# Patient Record
Sex: MALE | Race: WHITE | NOT HISPANIC OR LATINO | Employment: OTHER | URBAN - METROPOLITAN AREA
[De-identification: names, ages, dates, MRNs, and addresses within clinical notes are randomized per-mention and may not be internally consistent; named-entity substitution may affect disease eponyms.]

---

## 2017-02-07 ENCOUNTER — TRANSFERRED RECORDS (OUTPATIENT)
Dept: HEALTH INFORMATION MANAGEMENT | Facility: CLINIC | Age: 72
End: 2017-02-07

## 2017-07-21 ENCOUNTER — OFFICE VISIT (OUTPATIENT)
Dept: URGENT CARE | Facility: URGENT CARE | Age: 72
End: 2017-07-21
Payer: COMMERCIAL

## 2017-07-21 VITALS
SYSTOLIC BLOOD PRESSURE: 146 MMHG | OXYGEN SATURATION: 99 % | BODY MASS INDEX: 17.44 KG/M2 | HEART RATE: 88 BPM | TEMPERATURE: 97.5 F | DIASTOLIC BLOOD PRESSURE: 81 MMHG | WEIGHT: 114.7 LBS

## 2017-07-21 DIAGNOSIS — H61.23 BILATERAL IMPACTED CERUMEN: Primary | ICD-10-CM

## 2017-07-21 PROCEDURE — 99213 OFFICE O/P EST LOW 20 MIN: CPT | Performed by: FAMILY MEDICINE

## 2017-07-21 NOTE — MR AVS SNAPSHOT
After Visit Summary   7/21/2017    Gavin House    MRN: [id number]           Patient Information     Date Of Birth          1945        Visit Information        Provider Department      7/21/2017 10:15 AM Eb Amezquita DO [facility]        Today's Diagnoses     Bilateral impacted cerumen    -  1      Care Instructions      Earwax Removal    The ear canal makes earwax from the canal s lining. The ears make wax to lubricate and protect the ear canal. The ear canal is the tube that connects the middle ear to the outside of the ear. The wax protects the ear from bacteria, infection, and damage from water or trauma.  The wax that forms in the canal naturally moves toward the outside of the ear and falls out. In some cases, the ear may make too much wax. If the wax causes problems or keeps the healthcare provider from seeing into the ear, the extra wax may be removed.  Too much wax can affect your hearing. It can cause itching. In rare cases, it can be painful. Earwax should not be removed unless it is causing a problem. You should not stick objects into your ear to remove wax unless told to do so by your healthcare provider.  Healthcare providers can remove earwax safely. It is important to stay still during the procedure to avoid damage to the ear canal. But removing earwax generally doesn t hurt. You will not usually need anesthesia or pain medicine when the provider removes the earwax.  A number of conditions lead to earwax buildup. These include some skin problems, a narrow ear canal, or ears that make too much earwax. Using cotton swabs in the canal pushes earwax deeper into the ear and contributes to the buildup of earwax.  Home care    The healthcare provider may recommend mineral oil or an over-the-counter eardrop to use at home to soften the earwax. Use these products only if the provider recommends them. Use these products only if the provider recommends  them. Carefully follow the instructions given.    Don t use mineral oil or OTC eardrops if you might have an ear infection or a ruptured eardrum. Tell your healthcare provider right away if you have diabetes or an immune disorder.    Don t use cotton swabs in your ears. Cotton swabs may push wax deeper into the ear canal or damage the eardrum. Use cotton gauze or a wet washcloth  to gently remove wax on the outside of the ear and around the opening to the ear canal.    Don't use any probing device or object such as cotton-tipped swabs or judith pins to clean the inside of your ears.    Don t use ear candles to clean your ears. Candling can be dangerous. It can burn the ear canal. It can also make the condition worse instead of better.    Don t use cold water to rinse the ear. This will make you dizzy. If your provider tells you to rinse your ear, use only warm water or follow his or her instructions.    Check the ear for signs of infection or irritation listed below under When to seek medical advice.  Steps for using eardrops  1. Warm the medicine bottle by rubbing it between your hands for a few minutes.  2. Lie down on your side, with the affected ear up.  3. Place the recommended number of drops in the ear. Wet a cotton ball with the medicine. Gently put the cotton ball into the ear opening.  Follow-up care  Follow up with your healthcare provider, or as directed.  When to seek medical advice  Call the provider right away if you have:    Ear pain that gets worse    Fever of 100.4F F (38 C) or higher, or as directed by your healthcare provider    Worsening wax buildup    Severe pain, dizziness, or nausea    Bleeding from the ear    Hearing problems    Signs of irritation from the eardrops, such as burning, stinging, or swelling and tenderness    Foul-smelling fluid draining from the ear    Swelling, redness, or tenderness of the outer ear    Headache, neck pain, or stiff neck  Date Last Reviewed: 3/22/2015     4912-7053 [employer]. [address] [place] [address], [place] [address]. All rights reserved. This information is not intended as a substitute for professional medical care. Always follow your healthcare professional's instructions.          Earwax, Home Treatment    Everyone produces earwax from the lining of the ear canal. It serves to lubricate and protect the ear. The wax that forms in the canal naturally moves toward the outside of the ear and falls out. Sometimes the ear canal may contain too much wax. This can cause a blockage and loss of hearing. Directions are given below for home treatment.  Home care  If your doctor has advised you to remove a wax blockage yourself, follow these directions:    Unless a medicine was prescribed, you may use an over-the-counter product made for clearing earwax. These contain carbamide peroxide. Lie down with the blocked ear facing upward. Apply one dropper full of medicine and wait a few minutes. Grasp the outer ear and wiggle it to help the solution enter the canal.    Lean over a sink or basin with the blocked ear facing downward. Use a bulb syringe filled with warm (not hot or cold) water to rinse the ear several times. Use gentle pressure only.    If you are having trouble draining the water out of your ear canal, put a few drops of rubbing alcohol (isopropyl alcohol) into the ear canal. This will help remove the remaining water.    Repeat this procedure once a day for up to three days, or until your hearing is back to normal. Do not use this treatment for more than three days in a row.  Don ts    Don t use cold water to rinse the ear. This will make you dizzy.    Don t perform this procedure if you have an ear infection.    Don t perform this procedure if you have a ruptured eardrum.    Don t use cotton swabs, matches, hairpins, keys, or other objects to  clean  the ear canal. This can cause infection of the ear canal or rupture the eardrum. Because of their size  and shape, cotton swabs can push earwax deeper into the ear canal instead of removing it.  Follow-up care  Follow up with your health care provider if you are not improving after three cleaning attempts, or as advised.  When to seek medical advice  Call your health care provider right away if any of these occur:    Worsening ear pain    Fever of 101 F (38.3 C) or higher, or as directed by your health care provider    Hearing does not return to normal after three days of treatment    Fluid drainage or bleeding from the ear canal    Swelling, redness, or tenderness of the outer ear    Headache, neck pain, or stiff neck    3367-6508 [employer]. [address] [place] [address]. All rights reserved. This information is not intended as a substitute for professional medical care. Always follow your healthcare professional's instructions.                Follow-ups after your visit        Your next 10 appointments already scheduled     Aug 02, 2017  3:45 PM CDT   PHYSICAL with Kash Eller MD   [facility] ([facility])    [address] [place] [address] [phone]            Aug 29, 2017  9:45 AM CDT   [facility] EP RETURN with Lissette Verma MD   [facility] PHYSICIANS [facility] [place] ([facility])    [address] [place] [address] [phone]              Who to contact     If you have questions or need follow up information about today's clinic visit or your schedule please contact [facility] directly at [phone].  Normal or non-critical lab and imaging results will be communicated to you by MyChart, letter or phone within 4 business days after the clinic has received the results. If you do not hear from us within 7 days, please contact the clinic through MyChart or phone. If you have a critical or abnormal lab result, we will notify you by phone as soon  "as possible.  Submit refill requests through [employer] or call your pharmacy and they will forward the refill request to us. Please allow 3 business days for your refill to be completed.          Additional Information About Your Visit        [employer] Information     [employer] lets you send messages to your doctor, view your test results, renew your prescriptions, schedule appointments and more. To sign up, go to [email or web address] . Click on \"Log in\" on the left side of the screen, which will take you to the Welcome page. Then click on \"Sign up Now\" on the right side of the page.     You will be asked to enter the access code listed below, as well as some personal information. Please follow the directions to create your username and password.     Your access code is: [email or web address]  Expires: 10/19/2017 11:10 AM     Your access code will  in 90 days. If you need help or a new code, please call your [place] clinic or [phone].        Care EveryWhere ID     This is your Care EveryWhere ID. This could be used by other organizations to access your [place] medical records  [id number]        Your Vitals Were     Pulse Temperature Pulse Oximetry BMI (Body Mass Index)          88 97.5  F (36.4  C) (Oral) 99% 17.44 kg/m2         Blood Pressure from Last 3 Encounters:   17 146/81   08/15/16 122/72   16 128/78    Weight from Last 3 Encounters:   17 114 lb 11.2 oz (52 kg)   08/15/16 116 lb (52.6 kg)   16 114 lb (51.7 kg)              We Performed the Following     Nursing Communication 1        Primary Care Provider Office Phone # Fax #    Kash Eller [phone]       [address] [address]        Equal Access to Services     MAHNAZ MACKEY : Terrence Cardoso, minda sunshine, qasabas gore. [facility] [phone].    ATENCIÓN: Si lefty florentino " disposición servicios gratuitos de asistencia lingüística. Darryl dodson [phone].    We comply with applicable federal civil rights laws and [place] laws. We do not discriminate on the basis of race, color, national origin, age, disability sex, sexual orientation or gender identity.            Thank you!     Thank you for choosing [facility]  for your care. Our goal is always to provide you with excellent care. Hearing back from our patients is one way we can continue to improve our services. Please take a few minutes to complete the written survey that you may receive in the mail after your visit with us. Thank you!             Your Updated Medication List - Protect others around you: Learn how to safely use, store and throw away your medicines at www.disposemymeds.org.          This list is accurate as of: 7/21/17 11:10 AM.  Always use your most recent med list.                   Brand Name Dispense Instructions for use Diagnosis    aspirin 325 MG tablet      Take 325 mg by mouth daily        flecainide 100 MG tablet    TAMBOCOR    180 tablet    Take 1 tablet (100 mg) by mouth 2 times daily    Atrial fibrillation and flutter (H)       metroNIDAZOLE 1 % gel    METROGEL    60 g    apply qhs after washing face    Rosacea

## 2017-07-21 NOTE — PROGRESS NOTES
SUBJECTIVE:Gavin House is a 71 year old male who presents with bilateral ear waxfor day(s).   Severity: mild and moderate   Timing:still present  Additional symptoms include none.    History of recurrent otitis: no    Past Medical History:   Diagnosis Date     Allergic rhinitis, cause unspecified      Diverticulosis of colon     Diverticulitis 5/2010     Mild intermittent asthma      Paroxysmal atrial fibrillation (H)      Pneumonia, organism     at age 16     PVC (premature ventricular contraction)     intermittent bigeminy     Rosacea      Scoliosis (and kyphoscoliosis), idiopathic      Allergies   Allergen Reactions     Penicillins      Social History   Substance Use Topics     Smoking status: Former Smoker     Smokeless tobacco: Never Used      Comment: Quit 1993     Alcohol use 0.0 oz/week     0 Standard drinks or equivalent per week      Comment: 20 drinks per week       ROS: CONSTITUTIONAL:NEGATIVE for fever, chills, change in weight  INTEGUMENTARY/SKIN: NEGATIVE for worrisome rashes, moles or lesions    OBJECTIVE:  /81 (BP Location: Left arm, Patient Position: Chair, Cuff Size: Adult Regular)  Pulse 88  Temp 97.5  F (36.4  C) (Oral)  Wt 114 lb 11.2 oz (52 kg)  SpO2 99%  BMI 17.44 kg/m2   The right TM is not visualized secondary to cerumen     The right auditory canal is obstructed with cerumen  The left TM is not visualized secondary to cerumen  The left auditory canal is obstructed with cerumen      ICD-10-CM    1. Bilateral impacted cerumen H61.23      Bilateral ear wash  F/U PCP/IM/FP/UC if worse, not any better

## 2017-07-21 NOTE — PATIENT INSTRUCTIONS
Earwax Removal    The ear canal makes earwax from the canal s lining. The ears make wax to lubricate and protect the ear canal. The ear canal is the tube that connects the middle ear to the outside of the ear. The wax protects the ear from bacteria, infection, and damage from water or trauma.  The wax that forms in the canal naturally moves toward the outside of the ear and falls out. In some cases, the ear may make too much wax. If the wax causes problems or keeps the healthcare provider from seeing into the ear, the extra wax may be removed.  Too much wax can affect your hearing. It can cause itching. In rare cases, it can be painful. Earwax should not be removed unless it is causing a problem. You should not stick objects into your ear to remove wax unless told to do so by your healthcare provider.  Healthcare providers can remove earwax safely. It is important to stay still during the procedure to avoid damage to the ear canal. But removing earwax generally doesn t hurt. You will not usually need anesthesia or pain medicine when the provider removes the earwax.  A number of conditions lead to earwax buildup. These include some skin problems, a narrow ear canal, or ears that make too much earwax. Using cotton swabs in the canal pushes earwax deeper into the ear and contributes to the buildup of earwax.  Home care    The healthcare provider may recommend mineral oil or an over-the-counter eardrop to use at home to soften the earwax. Use these products only if the provider recommends them. Use these products only if the provider recommends them. Carefully follow the instructions given.    Don t use mineral oil or OTC eardrops if you might have an ear infection or a ruptured eardrum. Tell your healthcare provider right away if you have diabetes or an immune disorder.    Don t use cotton swabs in your ears. Cotton swabs may push wax deeper into the ear canal or damage the eardrum. Use cotton gauze or a wet  washcloth  to gently remove wax on the outside of the ear and around the opening to the ear canal.    Don't use any probing device or object such as cotton-tipped swabs or judith pins to clean the inside of your ears.    Don t use ear candles to clean your ears. Candling can be dangerous. It can burn the ear canal. It can also make the condition worse instead of better.    Don t use cold water to rinse the ear. This will make you dizzy. If your provider tells you to rinse your ear, use only warm water or follow his or her instructions.    Check the ear for signs of infection or irritation listed below under When to seek medical advice.  Steps for using eardrops  1. Warm the medicine bottle by rubbing it between your hands for a few minutes.  2. Lie down on your side, with the affected ear up.  3. Place the recommended number of drops in the ear. Wet a cotton ball with the medicine. Gently put the cotton ball into the ear opening.  Follow-up care  Follow up with your healthcare provider, or as directed.  When to seek medical advice  Call the provider right away if you have:    Ear pain that gets worse    Fever of 100.4F F (38 C) or higher, or as directed by your healthcare provider    Worsening wax buildup    Severe pain, dizziness, or nausea    Bleeding from the ear    Hearing problems    Signs of irritation from the eardrops, such as burning, stinging, or swelling and tenderness    Foul-smelling fluid draining from the ear    Swelling, redness, or tenderness of the outer ear    Headache, neck pain, or stiff neck  Date Last Reviewed: 3/22/2015    6092-9174 [employer]. [address] [place] [address]. All rights reserved. This information is not intended as a substitute for professional medical care. Always follow your healthcare professional's instructions.          Earwax, Home Treatment    Everyone produces earwax from the lining of the ear canal. It serves to lubricate and protect the ear.  The wax that forms in the canal naturally moves toward the outside of the ear and falls out. Sometimes the ear canal may contain too much wax. This can cause a blockage and loss of hearing. Directions are given below for home treatment.  Home care  If your doctor has advised you to remove a wax blockage yourself, follow these directions:    Unless a medicine was prescribed, you may use an over-the-counter product made for clearing earwax. These contain carbamide peroxide. Lie down with the blocked ear facing upward. Apply one dropper full of medicine and wait a few minutes. Grasp the outer ear and wiggle it to help the solution enter the canal.    Lean over a sink or basin with the blocked ear facing downward. Use a bulb syringe filled with warm (not hot or cold) water to rinse the ear several times. Use gentle pressure only.    If you are having trouble draining the water out of your ear canal, put a few drops of rubbing alcohol (isopropyl alcohol) into the ear canal. This will help remove the remaining water.    Repeat this procedure once a day for up to three days, or until your hearing is back to normal. Do not use this treatment for more than three days in a row.  Don ts    Don t use cold water to rinse the ear. This will make you dizzy.    Don t perform this procedure if you have an ear infection.    Don t perform this procedure if you have a ruptured eardrum.    Don t use cotton swabs, matches, hairpins, keys, or other objects to  clean  the ear canal. This can cause infection of the ear canal or rupture the eardrum. Because of their size and shape, cotton swabs can push earwax deeper into the ear canal instead of removing it.  Follow-up care  Follow up with your health care provider if you are not improving after three cleaning attempts, or as advised.  When to seek medical advice  Call your health care provider right away if any of these occur:    Worsening ear pain    Fever of 101 F (38.3 C) or higher, or  as directed by your health care provider    Hearing does not return to normal after three days of treatment    Fluid drainage or bleeding from the ear canal    Swelling, redness, or tenderness of the outer ear    Headache, neck pain, or stiff neck    1116-1551 [employer]. [address] [place] [address]. All rights reserved. This information is not intended as a substitute for professional medical care. Always follow your healthcare professional's instructions.

## 2017-07-21 NOTE — NURSING NOTE
"Chief Complaint   Patient presents with     Plugged Ears     pt. states that he has a wax bulid up in his ears which have affected his hearing. 3xdays. pt.stated he used cotton swabs and ear wash to clean the ear. Left Ear       Initial /81 (BP Location: Left arm, Patient Position: Chair, Cuff Size: Adult Regular)  Pulse 88  Temp 97.5  F (36.4  C) (Oral)  Wt 114 lb 11.2 oz (52 kg)  SpO2 99%  BMI 17.44 kg/m2 Estimated body mass index is 17.44 kg/(m^2) as calculated from the following:    Height as of 8/15/16: 5' 8\" (1.727 m).    Weight as of this encounter: 114 lb 11.2 oz (52 kg).  Medication Reconciliation: complete    "

## 2017-08-23 ENCOUNTER — OFFICE VISIT (OUTPATIENT)
Dept: INTERNAL MEDICINE | Facility: CLINIC | Age: 72
End: 2017-08-23
Payer: COMMERCIAL

## 2017-08-23 VITALS
HEIGHT: 68 IN | TEMPERATURE: 98.3 F | WEIGHT: 117.4 LBS | BODY MASS INDEX: 17.79 KG/M2 | HEART RATE: 84 BPM | DIASTOLIC BLOOD PRESSURE: 84 MMHG | SYSTOLIC BLOOD PRESSURE: 132 MMHG | OXYGEN SATURATION: 97 %

## 2017-08-23 DIAGNOSIS — Z12.5 SCREENING FOR PROSTATE CANCER: ICD-10-CM

## 2017-08-23 DIAGNOSIS — Z00.00 MEDICARE ANNUAL WELLNESS VISIT, SUBSEQUENT: Primary | ICD-10-CM

## 2017-08-23 DIAGNOSIS — Z13.1 SCREENING FOR DIABETES MELLITUS: ICD-10-CM

## 2017-08-23 DIAGNOSIS — J45.20 MILD INTERMITTENT ASTHMA WITHOUT COMPLICATION: ICD-10-CM

## 2017-08-23 DIAGNOSIS — Z13.6 CARDIOVASCULAR SCREENING; LDL GOAL LESS THAN 160: ICD-10-CM

## 2017-08-23 DIAGNOSIS — K57.30 DIVERTICULOSIS OF LARGE INTESTINE WITHOUT HEMORRHAGE: ICD-10-CM

## 2017-08-23 LAB
ANION GAP SERPL CALCULATED.3IONS-SCNC: 7 MMOL/L (ref 3–14)
BUN SERPL-MCNC: 9 MG/DL (ref 7–30)
CALCIUM SERPL-MCNC: 9 MG/DL (ref 8.5–10.1)
CHLORIDE SERPL-SCNC: 103 MMOL/L (ref 94–109)
CHOLEST SERPL-MCNC: 187 MG/DL
CO2 SERPL-SCNC: 30 MMOL/L (ref 20–32)
CREAT SERPL-MCNC: 0.93 MG/DL (ref 0.66–1.25)
GFR SERPL CREATININE-BSD FRML MDRD: 80 ML/MIN/1.7M2
GLUCOSE SERPL-MCNC: 80 MG/DL (ref 70–99)
HDLC SERPL-MCNC: 70 MG/DL
LDLC SERPL CALC-MCNC: 94 MG/DL
NONHDLC SERPL-MCNC: 117 MG/DL
POTASSIUM SERPL-SCNC: 4.5 MMOL/L (ref 3.4–5.3)
PSA SERPL-ACNC: 2.79 UG/L (ref 0–4)
SODIUM SERPL-SCNC: 140 MMOL/L (ref 133–144)
TRIGL SERPL-MCNC: 116 MG/DL

## 2017-08-23 PROCEDURE — 99207 C PAF COMPLETED  NO CHARGE: CPT | Mod: 25 | Performed by: INTERNAL MEDICINE

## 2017-08-23 PROCEDURE — 99397 PER PM REEVAL EST PAT 65+ YR: CPT | Mod: 25 | Performed by: INTERNAL MEDICINE

## 2017-08-23 PROCEDURE — 80061 LIPID PANEL: CPT | Performed by: INTERNAL MEDICINE

## 2017-08-23 PROCEDURE — 80048 BASIC METABOLIC PNL TOTAL CA: CPT | Performed by: INTERNAL MEDICINE

## 2017-08-23 PROCEDURE — 36415 COLL VENOUS BLD VENIPUNCTURE: CPT | Performed by: INTERNAL MEDICINE

## 2017-08-23 PROCEDURE — G0103 PSA SCREENING: HCPCS | Performed by: INTERNAL MEDICINE

## 2017-08-23 NOTE — NURSING NOTE
"Chief Complaint   Patient presents with     Physical     fasting        Initial /84 (BP Location: Left arm, Patient Position: Chair, Cuff Size: Adult Regular)  Pulse 84  Temp 98.3  F (36.8  C) (Oral)  Ht 5' 8\" (1.727 m)  Wt 117 lb 6.4 oz (53.3 kg)  SpO2 97%  BMI 17.85 kg/m2 Estimated body mass index is 17.85 kg/(m^2) as calculated from the following:    Height as of this encounter: 5' 8\" (1.727 m).    Weight as of this encounter: 117 lb 6.4 oz (53.3 kg).  Medication Reconciliation: complete      "

## 2017-08-23 NOTE — LETTER
8/23/2017         Gavin Houes  [address]  [facility] [address]            Dear Mr. House,    I am writing to inform you of the lab tests you had performed recently.      Your cholesterol results are as follows:    Lab Results   Component Value Date    CHOL 187 08/23/2017    HDL 70 08/23/2017    LDL 94 08/23/2017    TRIG 116 08/23/2017    CHOLHDLRATIO 2.5 12/03/2014       Additional lab results are as follows:    Kidney function: NORMAL  PSA: NORMAL  Electrolytes: NORMAL  Glucose: NORMAL    Your lab testing all looked perfectly normal.  Your cholesterol is still at a very reasonable level.    Thank you for allowing me to participate in your care.  If you have further questions, please contact us at [phone].      Sincerely,        FAY Eller MD  Dept. of Internal Medicine  [facility][1]

## 2017-08-23 NOTE — MR AVS SNAPSHOT
After Visit Summary   8/23/2017    Gavin House    MRN: [id number]           Patient Information     Date Of Birth          1945        Visit Information        Provider Department      8/23/2017 8:15 AM Kash Eller MD [facility]        Today's Diagnoses     Medicare annual wellness visit, subsequent    -  1    CARDIOVASCULAR SCREENING; LDL GOAL LESS THAN 160        Screening for diabetes mellitus        Screening for prostate cancer        Mild intermittent asthma without complication        Diverticulosis of large intestine without hemorrhage          Care Instructions      Preventive Health Recommendations:   Male Ages 65 and over    Yearly exam:             See your health care provider every year in order to  o   Review health changes.   o   Discuss preventive care.    o   Review your medicines if your doctor has prescribed any.    Talk with your health care provider about whether you should have a test to screen for prostate cancer (PSA).    Every 3 years, have a diabetes test (fasting glucose). If you are at risk for diabetes, you should have this test more often.    Every 5 years, have a cholesterol test. Have this test more often if you are at risk for high cholesterol or heart disease.     Every 10 years, have a colonoscopy. Or, have a yearly FIT test (stool test). These exams will check for colon cancer.    Talk to with your health care provider about screening for Abdominal Aortic Aneurysm if you have a family history of AAA or have a history of smoking.    Shots:     Get a flu shot each year.     Get a tetanus shot every 10 years.     Talk to your doctor about your pneumonia vaccines. There are now two you should receive - Pneumovax (PPSV 23) and Prevnar (PCV 13).     Talk to your doctor about a shingles vaccine.     Talk to your doctor about the hepatitis B vaccine.  Nutrition:     Eat at least 5 servings of fruits and vegetables each day.     Eat  "whole-grain bread, whole-wheat pasta and brown rice instead of white grains and rice.     Talk to your provider about Calcium and Vitamin D.   Lifestyle    Exercise for at least 150 minutes a week (30 minutes a day, 5 days a week). This will help you control your weight and prevent disease.     Limit alcohol to one drink per day.     No smoking.     Wear sunscreen to prevent skin cancer.     See your dentist every six months for an exam and cleaning.     See your eye doctor every 1 to 2 years to screen for conditions such as glaucoma, macular degeneration, cataracts, etc           Follow-ups after your visit        Your next 10 appointments already scheduled     Aug 29, 2017  9:45 AM CDT   [facility] EP RETURN with Lissette Verma MD   [facility] PHYSICIANS [facility] AT [place] ([facility])    [address] [place] [id number] [phone]              Who to contact     If you have questions or need follow up information about today's clinic visit or your schedule please contact [facility] directly at [phone].  Normal or non-critical lab and imaging results will be communicated to you by Level [employer], letter or phone within 4 business days after the clinic has received the results. If you do not hear from us within 7 days, please contact the clinic through [employer] or phone. If you have a critical or abnormal lab result, we will notify you by phone as soon as possible.  Submit refill requests through [employer] or call your pharmacy and they will forward the refill request to us. Please allow 3 business days for your refill to be completed.          Additional Information About Your Visit        Level [email or web address] Information     [employer]School lets you send messages to your doctor, view your test results, renew your prescriptions, schedule appointments and more. To sign up, go to [email or web address] . Click on \"Log in\" on the left side of the screen, which will take you to the " "Welcome page. Then click on \"Sign up Now\" on the right side of the page.     You will be asked to enter the access code listed below, as well as some personal information. Please follow the directions to create your username and password.     Your access code is: [email or web address]  Expires: 10/19/2017 11:10 AM     Your access code will  in 90 days. If you need help or a new code, please call your [place] clinic or [phone].        Care EveryWhere ID     This is your Care EveryWhere ID. This could be used by other organizations to access your [place] medical records  [id number]        Your Vitals Were     Pulse Temperature Height Pulse Oximetry BMI (Body Mass Index)       84 98.3  F (36.8  C) (Oral) 5' 8\" (1.727 m) 97% 17.85 kg/m2        Blood Pressure from Last 3 Encounters:   17 132/84   17 146/81   08/15/16 122/72    Weight from Last 3 Encounters:   17 117 lb 6.4 oz (53.3 kg)   17 114 lb 11.2 oz (52 kg)   08/15/16 116 lb (52.6 kg)              We Performed the Following     Asthma Action Plan (AAP)     Basic metabolic panel  (Ca, Cl, CO2, Creat, Gluc, K, Na, BUN)     Lipid panel reflex to direct LDL     PAF COMPLETED     PSA, screen        Primary Care Provider Office Phone # Fax #    Kash Eller [phone]       [address] [place] [address]        Equal Access to Services     [facility]: Hadii valencia houser hadasho Sokevali, waaxda luqadaha, qaybta kaalmada sabas miller . So [facility] [phone].    ATENCIÓN: Si habla español, tiene a pugh disposición servicios gratuitos de asistencia lingüística. Llame al [phone].    We comply with applicable federal civil rights laws and [place] laws. We do not discriminate on the basis of race, color, national origin, age, disability sex, sexual orientation or gender identity.            Thank you!     Thank you for choosing [facility]  for your " care. Our goal is always to provide you with excellent care. Hearing back from our patients is one way we can continue to improve our services. Please take a few minutes to complete the written survey that you may receive in the mail after your visit with us. Thank you!             Your Updated Medication List - Protect others around you: Learn how to safely use, store and throw away your medicines at www.disposemymeds.org.          This list is accurate as of: 8/23/17 10:20 AM.  Always use your most recent med list.                   Brand Name Dispense Instructions for use Diagnosis    aspirin 325 MG tablet      Take 325 mg by mouth daily        flecainide 100 MG tablet    TAMBOCOR    180 tablet    Take 1 tablet (100 mg) by mouth 2 times daily    Atrial fibrillation and flutter (H)       metroNIDAZOLE 1 % gel    METROGEL    60 g    apply qhs after washing face    Rosacea

## 2017-08-23 NOTE — PROGRESS NOTES
SUBJECTIVE:   Gavin House is a 71 year old male who presents for Preventive Visit.      Are you in the first 12 months of your Medicare Part B coverage?  No    Healthy Habits:    Do you get at least three servings of calcium containing foods daily (dairy, green leafy vegetables, etc.)? yes    Amount of exercise or daily activities, outside of work: 15-30 mins  day(s) per week    Problems taking medications regularly No    Medication side effects: No    Have you had an eye exam in the past two years? no    Do you see a dentist twice per year? no    Do you have sleep apnea, excessive snoring or daytime drowsiness?no    COGNITIVE SCREEN  1) Repeat 3 items (Banana, Sunrise, Chair)    2) Clock draw: NORMAL  3) 3 item recall: Recalls 3 objects  Results: NORMAL clock, 1-2 items recalled: COGNITIVE IMPAIRMENT LESS LIKELY    Mini-CogTM Copyright S Cr. Licensed by the author for use in [facility]; reprinted with permission (yarelis@[email or web address]). All rights reserved.          Reviewed and updated as needed this visit by clinical staffTobacco  Allergies         Reviewed and updated as needed this visit by Provider        Social History   Substance Use Topics     Smoking status: Former Smoker     Smokeless tobacco: Never Used      Comment: Quit 1993     Alcohol use 0.0 oz/week     0 Standard drinks or equivalent per week      Comment: 20 drinks per week       .    Today's PHQ-2 Score:   PHQ-2 ( 1999 Pfizer) 8/23/2017 5/9/2016   Q1: Little interest or pleasure in doing things 0 0   Q2: Feeling down, depressed or hopeless 0 -   PHQ-2 Score 0 -   Q1: Little interest or pleasure in doing things Not at all -   Q2: Feeling down, depressed or hopeless Not at all -   PHQ-2 Score 0 -         Do you feel safe in your environment - Yes    Do you have a Health Care Directive?: No: Advance care planning reviewed with patient; information given to patient to review.      Current providers sharing in care for this patient  "include: Patient Care Team:  Kash Eller MD as PCP - General      Hearing impairment: No    Ability to successfully perform activities of daily living: Yes, no assistance needed     Fall risk:  Fallen 2 or more times in the past year?: No  Any fall with injury in the past year?: No      Home safety:  none identified      The following health maintenance items are reviewed in Epic and correct as of today:Health Maintenance   Topic Date Due     AORTIC ANEURYSM SCREENING (SYSTEM ASSIGNED)  11/23/2011     ASTHMA CONTROL TEST Q6 MOS  11/09/2016     ASTHMA ACTION PLAN Q1 YR  05/09/2017     FALL RISK ASSESSMENT  05/09/2017     INFLUENZA VACCINE (SYSTEM ASSIGNED)  09/01/2017     ADVANCE DIRECTIVE PLANNING Q5 YRS  05/08/2018     COLONOSCOPY Q10 YR  09/10/2020     LIPID SCREEN Q5 YR MALE (SYSTEM ASSIGNED)  05/05/2021     TETANUS IMMUNIZATION (SYSTEM ASSIGNED)  05/06/2023     PNEUMOCOCCAL  Completed     HEPATITIS C SCREENING  Completed       No acute issues to discuss today.  Patient continues on flecainide for atrial fibrillation/flutter, and full aspirin daily.  Continues on metronidazole as needed for rosacea, though does not feel this is particularly effective.      Patient carries diagnosis of mild intermittent asthma, but basically never has to use his rescue albuterol.  Today's ACT noted.        ROS:  Constitutional, HEENT, cardiovascular, pulmonary, gi and gu systems are negative, except as otherwise noted.      OBJECTIVE:   /84 (BP Location: Left arm, Patient Position: Chair, Cuff Size: Adult Regular)  Pulse 84  Temp 98.3  F (36.8  C) (Oral)  Ht 5' 8\" (1.727 m)  Wt 117 lb 6.4 oz (53.3 kg)  SpO2 97%  BMI 17.85 kg/m2 Estimated body mass index is 17.85 kg/(m^2) as calculated from the following:    Height as of this encounter: 5' 8\" (1.727 m).    Weight as of this encounter: 117 lb 6.4 oz (53.3 kg).  EXAM:   GENERAL: healthy, alert and no distress  NECK: no adenopathy, no asymmetry, masses, or scars " "and thyroid normal to palpation  RESP: lungs clear to auscultation - no rales, rhonchi or wheezes  CV: regular rate and rhythm, normal S1 S2, no S3 or S4, no murmur, click or rub, no peripheral edema and peripheral pulses strong  ABDOMEN: soft, nontender, no hepatosplenomegaly, no masses and bowel sounds normal  MS: no gross musculoskeletal defects noted, no edema    ASSESSMENT / PLAN:   1. Medicare annual wellness visit, subsequent      2. CARDIOVASCULAR SCREENING; LDL GOAL LESS THAN 160  Re-check lipids today  - Lipid panel reflex to direct LDL    3. Screening for diabetes mellitus    - Basic metabolic panel  (Ca, Cl, CO2, Creat, Gluc, K, Na, BUN)    4. Screening for prostate cancer    - PSA, screen    5. Mild intermittent asthma without complication    - Asthma Action Plan (AAP)    6. Diverticulosis of large intestine without hemorrhage        End of Life Planning:  Patient currently has an advanced directive: No.  I have verified the patient's ablity to prepare an advanced directive/make health care decisions.  Literature was provided to assist patient in preparing an advanced directive.    COUNSELING:  Reviewed preventive health counseling, as reflected in patient instructions        Estimated body mass index is 17.85 kg/(m^2) as calculated from the following:    Height as of this encounter: 5' 8\" (1.727 m).    Weight as of this encounter: 117 lb 6.4 oz (53.3 kg).     reports that he has quit smoking. He has never used smokeless tobacco.        Appropriate preventive services were discussed with this patient, including applicable screening as appropriate for cardiovascular disease, diabetes, osteopenia/osteoporosis, and glaucoma.  As appropriate for age/gender, discussed screening for colorectal cancer, prostate cancer, breast cancer, and cervical cancer. Checklist reviewing preventive services available has been given to the patient.    Reviewed patients plan of care and provided an AVS. The Basic Care Plan " (routine screening as documented in Health Maintenance) for Gavin meets the Care Plan requirement. This Care Plan has been established and reviewed with the Patient.    Counseling Resources:  ATP IV Guidelines  Pooled Cohorts Equation Calculator  Breast Cancer Risk Calculator  FRAX Risk Assessment  ICSI Preventive Guidelines  Dietary Guidelines for Americans, 2010  USDA's MyPlate  ASA Prophylaxis  Lung CA Screening    Kash Eller MD  [facility]

## 2017-08-24 ASSESSMENT — ASTHMA QUESTIONNAIRES: ACT_TOTALSCORE: 25

## 2017-08-29 ENCOUNTER — OFFICE VISIT (OUTPATIENT)
Dept: CARDIOLOGY | Facility: CLINIC | Age: 72
End: 2017-08-29
Attending: INTERNAL MEDICINE
Payer: COMMERCIAL

## 2017-08-29 VITALS
HEART RATE: 72 BPM | SYSTOLIC BLOOD PRESSURE: 120 MMHG | BODY MASS INDEX: 17.28 KG/M2 | HEIGHT: 68 IN | WEIGHT: 114 LBS | DIASTOLIC BLOOD PRESSURE: 70 MMHG

## 2017-08-29 DIAGNOSIS — I48.0 PAROXYSMAL ATRIAL FIBRILLATION (H): ICD-10-CM

## 2017-08-29 PROCEDURE — 99213 OFFICE O/P EST LOW 20 MIN: CPT | Mod: 25 | Performed by: INTERNAL MEDICINE

## 2017-08-29 PROCEDURE — 93000 ELECTROCARDIOGRAM COMPLETE: CPT | Performed by: INTERNAL MEDICINE

## 2017-08-29 NOTE — MR AVS SNAPSHOT
"              After Visit Summary   8/29/2017    Gavin House    MRN: [id number]           Patient Information     Date Of Birth          1945        Visit Information        Provider Department      8/29/2017 9:45 AM Lissette Verma MD [facility] AT [place]        Today's Diagnoses     Paroxysmal atrial fibrillation (H)           Follow-ups after your visit        Additional Services     Follow-Up with Cardiac Advanced Practice Provider                 Future tests that were ordered for you today     Open Future Orders        Priority Expected Expires Ordered    Follow-Up with Cardiac Advanced Practice Provider Routine 8/29/2018 1/11/2019 8/29/2017    EKG 12-lead complete w/read (Future)- to be scheduled Routine 8/24/2018 8/24/2018 8/29/2017            Who to contact     If you have questions or need follow up information about today's clinic visit or your schedule please contact [facility] HE[facility] AT [place] directly at [phone].  Normal or non-critical lab and imaging results will be communicated to you by MyChart, letter or phone within 4 business days after the clinic has received the results. If you do not hear from us within 7 days, please contact the clinic through [employer] or phone. If you have a critical or abnormal lab result, we will notify you by phone as soon as possible.  Submit refill requests through [employer] or call your pharmacy and they will forward the refill request to us. Please allow 3 business days for your refill to be completed.          Additional Information About Your Visit        Fork[employer]hart Information     [employer] lets you send messages to your doctor, view your test results, renew your prescriptions, schedule appointments and more. To sign up, go to [email or web address] . Click on \"Log in\" on the left side of the screen, which will take you to the Welcome page. Then click on \"Sign up Now\" on the right side of the page. " "    You will be asked to enter the access code listed below, as well as some personal information. Please follow the directions to create your username and password.     Your access code is: [email or web address]  Expires: 10/19/2017 11:10 AM     Your access code will  in 90 days. If you need help or a new code, please call your [place] clinic or [phone].        Care EveryWhere ID     This is your Care EveryWhere ID. This could be used by other organizations to access your [place] medical records  [id number]        Your Vitals Were     Pulse Height BMI (Body Mass Index)             72 1.727 m (5' 8\") 17.33 kg/m2          Blood Pressure from Last 3 Encounters:   17 120/70   17 132/84   17 146/81    Weight from Last 3 Encounters:   17 51.7 kg (114 lb)   17 53.3 kg (117 lb 6.4 oz)   17 52 kg (114 lb 11.2 oz)              We Performed the Following     EKG 12-lead complete w/read (Future)- to be scheduled     Follow-Up with Electrophysiologist        Primary Care Provider Office Phone # Fax #    Kash Eller [phone]       [address] [address]        Equal Access to Services     [facility]HNA[facility] MAC[facility] : Hadii valencia houser hadasho Sovictorino, waaxda luqadaha, qaybta kaalmada adejustin, sabas gallego. So [facility] [phone].    ATENCIÓN: Si habla español, tiene a pugh disposición servicios gratuitos de asistencia lingüística. Darryl al [phone].    We comply with applicable federal civil rights laws and [place] laws. We do not discriminate on the basis of race, color, national origin, age, disability sex, sexual orientation or gender identity.            Thank you!     Thank you for choosing [facility] AT [place]  for your care. Our goal is always to provide you with excellent care. Hearing back from our patients is one way we can continue to improve our services. Please take a few minutes to " complete the written survey that you may receive in the mail after your visit with us. Thank you!             Your Updated Medication List - Protect others around you: Learn how to safely use, store and throw away your medicines at www.disposemymeds.org.          This list is accurate as of: 8/29/17 10:01 AM.  Always use your most recent med list.                   Brand Name Dispense Instructions for use Diagnosis    aspirin 325 MG tablet      Take 325 mg by mouth daily        flecainide 100 MG tablet    TAMBOCOR    180 tablet    Take 1 tablet (100 mg) by mouth 2 times daily    Atrial fibrillation and flutter (H)       metroNIDAZOLE 1 % gel    METROGEL    60 g    apply qhs after washing face    Rosacea

## 2017-08-29 NOTE — PROGRESS NOTES
"HPI and Plan:   See dictation    Orders Placed This Encounter   Procedures     Follow-Up with Cardiac Advanced Practice Provider     EKG 12-lead complete w/read (Future)- to be scheduled       No orders of the defined types were placed in this encounter.      There are no discontinued medications.      Encounter Diagnosis   Name Primary?     Paroxysmal atrial fibrillation (H)        CURRENT MEDICATIONS:  Current Outpatient Prescriptions   Medication Sig Dispense Refill     flecainide (TAMBOCOR) 100 MG tablet Take 1 tablet (100 mg) by mouth 2 times daily 180 tablet 3     aspirin 325 MG tablet Take 325 mg by mouth daily       metroNIDAZOLE (METROGEL) 1 % gel apply qhs after washing face 60 g 3       ALLERGIES     Allergies   Allergen Reactions     Penicillins        PAST MEDICAL HISTORY:  Past Medical History:   Diagnosis Date     Allergic rhinitis, cause unspecified      Diverticulosis of colon     Diverticulitis 5/2010     Mild intermittent asthma      Paroxysmal atrial fibrillation (H) 05/09/2016     PVC (premature ventricular contraction)     intermittent bigeminy     Rosacea      Scoliosis (and kyphoscoliosis), idiopathic        PAST SURGICAL HISTORY:  Past Surgical History:   Procedure Laterality Date     C NONSPECIFIC PROCEDURE  1974    right inguinal herniorraphy     C NONSPECIFIC PROCEDURE  age 16    L inguinal herniorraphy       FAMILY HISTORY:  Family History   Problem Relation Age of Onset     CANCER Father      Type unknown; d: age 76     HEART DISEASE Mother      \"enlarged heart\"; d: age 75     C.A.D. Brother      bypass surgery; b: 1925       SOCIAL HISTORY:  Social History     Social History     Marital status:      Spouse name: N/A     Number of children: 2     Years of education: N/A     Occupational History      [employer]     Social History Main Topics     Smoking status: Former Smoker     Smokeless tobacco: Never Used      Comment: Quit 1993     Alcohol use 0.0 oz/week     0 Standard " "drinks or equivalent per week      Comment: 20 drinks per week     Drug use: No     Sexual activity: Yes     Partners: Female     Other Topics Concern     Parent/Sibling W/ Cabg, Mi Or Angioplasty Before 65f 55m? No     Social History Narrative       Review of Systems:  Skin:  Negative       Eyes:  Positive for glasses;cataracts    ENT:  Negative      Respiratory:  Positive for   asthma   Cardiovascular:  Negative      Gastroenterology: Positive for heartburn    Genitourinary:  Negative      Musculoskeletal:  Positive for arthritis    Neurologic:  Negative      Psychiatric:  Positive for excessive stress wife has cancer  Heme/Lymph/Imm:  Positive for allergies    Endocrine:  Negative        Physical Exam:  Vitals: /70  Pulse 72  Ht 1.727 m (5' 8\")  Wt 51.7 kg (114 lb)  BMI 17.33 kg/m2    Constitutional:  cooperative, alert and oriented, well developed, well nourished, in no acute distress        Skin:  warm and dry to the touch, no apparent skin lesions or masses noted        Head:  normocephalic, no masses or lesions        Eyes:  pupils equal and round, conjunctivae and lids unremarkable, sclera white, no xanthalasma, EOMS intact, no nystagmus        ENT:  no pallor or cyanosis, dentition good        Neck:  carotid pulses are full and equal bilaterally, JVP normal, no carotid bruit, no thyromegaly        Chest:  normal breath sounds, clear to auscultation, normal A-P diameter, normal symmetry, normal respiratory excursion, no use of accessory muscles          Cardiac: regular rhythm, normal S1/S2, no S3 or S4, apical impulse not displaced, no murmurs, gallops or rubs                  Abdomen:  abdomen soft, non-tender, BS normoactive, no mass, no HSM, no bruits        Vascular: pulses full and equal, no bruits auscultated                                        Extremities and Back:  no deformities, clubbing, cyanosis, erythema observed              Neurological:  affect appropriate, oriented to time, " person and place              CC  Lissette Verma MD  [address]  [address]  [place] [address]

## 2017-08-29 NOTE — LETTER
8/29/2017    Kash Eller MD  [address] [place] [address]    RE: Gavin House       Dear Colleague,    I had the pleasure of seeing Gavin House in the [facility].    I saw Mr. House for followup of atrial fibrillation.  He is a 71-year-old white male with symptomatic paroxysmal atrial fibrillation.  He has been on flecainide 100 mg p.o. b.i.d. with no recurrence of atrial fibrillation.  He has seemed to tolerate flecainide well without apparent side effects.  For the last year, he has been doing well with no complaints.      PHYSICAL EXAMINATION:   VITAL SIGNS:  Blood pressure was 120/70, heart rate 72 beats per minute, body weight 114 pounds.   HEENT:  Eyes and ENT were unremarkable.   LUNGS:  Clear.   CARDIAC:  Rhythm was regular, and heart sounds were normal without murmur.   ABDOMEN:  Examination showed no hepatomegaly.   EXTREMITIES:  There was no pedal edema.      EKG showed sinus rhythm.     Outpatient Encounter Prescriptions as of 8/29/2017   Medication Sig Dispense Refill     flecainide (TAMBOCOR) 100 MG tablet Take 1 tablet (100 mg) by mouth 2 times daily 180 tablet 3     aspirin 325 MG tablet Take 325 mg by mouth daily       metroNIDAZOLE (METROGEL) 1 % gel apply qhs after washing face 60 g 3     No facility-administered encounter medications on file as of 8/29/2017.       ASSESSMENT AND RECOMMENDATIONS:  Mr. House is doing well on flecainide with no side effects and no recurrent atrial fibrillation.  He is doing well with no complaints at the present time.  I agree for him to continue the current medication.  He is scheduled to return in 1 year.  He will see Ms. Ramona Walters next year and see me in 2019.   Again, thank you for allowing me to participate in the care of your patient.      Sincerely,    Lissette Verma MD     [facility]

## 2017-08-29 NOTE — PROGRESS NOTES
HISTORY OF PRESENT ILLNESS:  I saw Mr. House for followup of atrial fibrillation.  He is a 71-year-old white male with symptomatic paroxysmal atrial fibrillation.  He has been on flecainide 100 mg p.o. b.i.d. with no recurrence of atrial fibrillation.  He has seemed to tolerate flecainide well without apparent side effects.  For the last year, he has been doing well with no complaints.      PHYSICAL EXAMINATION:   VITAL SIGNS:  Blood pressure was 120/70, heart rate 72 beats per minute, body weight 114 pounds.   HEENT:  Eyes and ENT were unremarkable.   LUNGS:  Clear.   CARDIAC:  Rhythm was regular, and heart sounds were normal without murmur.   ABDOMEN:  Examination showed no hepatomegaly.   EXTREMITIES:  There was no pedal edema.      EKG showed sinus rhythm.      ASSESSMENT AND RECOMMENDATIONS:  Mr. House is doing well on flecainide with no side effects and no recurrent atrial fibrillation.  He is doing well with no complaints at the present time.  I agree for him to continue the current medication.  He is scheduled to return in 1 year.  He will see Ms. Ramona Walters next year and see me in 2019.      cc:   [place] [address]         TIMMY CASTAÑEDA MD             D: 2017 10:06   T: 2017 10:19   MT: LUI      Name:     ZOIE HOUSE   MRN:      [id number]        Account:      [id number]   :      1945           Service Date: 2017      Document: [id number]

## 2017-11-24 DIAGNOSIS — I48.92 ATRIAL FIBRILLATION AND FLUTTER (H): ICD-10-CM

## 2017-11-24 DIAGNOSIS — I48.91 ATRIAL FIBRILLATION AND FLUTTER (H): ICD-10-CM

## 2017-11-24 RX ORDER — FLECAINIDE ACETATE 100 MG/1
100 TABLET ORAL 2 TIMES DAILY
Qty: 180 TABLET | Refills: 2 | Status: SHIPPED | OUTPATIENT
Start: 2017-11-24 | End: 2018-08-31

## 2018-07-16 ENCOUNTER — OFFICE VISIT (OUTPATIENT)
Dept: URGENT CARE | Facility: URGENT CARE | Age: 73
End: 2018-07-16
Payer: COMMERCIAL
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
## 2018-07-16 DIAGNOSIS — S61.259A DOG BITE OF FINGER, INITIAL ENCOUNTER: Primary | ICD-10-CM

## 2018-07-16 DIAGNOSIS — L03.114 CELLULITIS OF LEFT UPPER EXTREMITY: ICD-10-CM

## 2018-07-16 PROCEDURE — 99214 OFFICE O/P EST MOD 30 MIN: CPT | Performed by: PHYSICIAN ASSISTANT

## 2018-07-16 RX ORDER — CLINDAMYCIN HCL 300 MG
300 CAPSULE ORAL 3 TIMES DAILY
Qty: 30 CAPSULE | Refills: 0 | Status: SHIPPED | OUTPATIENT
Start: 2018-07-16 | End: 2018-09-12

## 2018-07-16 RX ORDER — SULFAMETHOXAZOLE/TRIMETHOPRIM 800-160 MG
1 TABLET ORAL 2 TIMES DAILY
Qty: 20 TABLET | Refills: 0 | Status: SHIPPED | OUTPATIENT
Start: 2018-07-16 | End: 2018-09-12

## 2018-07-16 NOTE — MR AVS SNAPSHOT
After Visit Summary   7/16/2018    Gavin House    MRN: [id number]           Patient Information     Date Of Birth          1945        Visit Information        Provider Department      7/16/2018 8:10 PM Bo Ochoa PA-C [facility]        Today's Diagnoses     Dog bite of finger, initial encounter    -  1    Cellulitis of left upper extremity          Care Instructions      Discharge Instructions for Cellulitis  You have been diagnosed with cellulitis. This is an infection in the deepest layer of the skin. In some cases, the infection also affects the muscle. Cellulitis is caused by bacteria. The bacteria can enter the body through broken skin. This can happen with a cut, scratch, animal bite, or an insect bite that has been scratched. You may have been treated in the hospital with antibiotics and fluids. You will likely be given a prescription for antibiotics to take at home. This sheet will help you take care of yourself at home.  Home care  When you are home:    Take the prescribed antibiotic medicine you are given as directed until it is gone. Take it even if you feel better. It treats the infection and stops it from returning. Not taking all the medicine can make future infections hard to treat.    Keep the infected area clean.    When possible, raise the infected area above the level of your heart. This helps keep swelling down.    Talk with your healthcare provider if you are in pain. Ask what kind of over-the-counter medicine you can take for pain.    Apply clean bandages as advised.    Take your temperature once a day for a week.    Wash your hands often to prevent spreading the infection.  In the future, wash your hands before and after you touch cuts, scratches, or bandages. This will help prevent infection.   When to call your healthcare provider  Call your healthcare provider immediately if you have any of the following:    Difficulty or pain when  moving the joints above or below the infected area    Discharge or pus draining from the area    Fever of 100.4 F (38 C) or higher, or as directed by your healthcare provider    Pain that gets worse in or around the infected     Redness that gets worse in or around the infected area, particularly if the area of redness expands to a wider area    Shaking chills    Swelling of the infected area    Vomiting   Date Last Reviewed: 8/1/2016 2000-2017 [employer]. [address] [place] [address], [place] [address]. All rights reserved. This information is not intended as a substitute for professional medical care. Always follow your healthcare professional's instructions.        Dog Bite  A dog bite can cause a wound deep enough to break the skin. In such cases, the wound is cleaned and sometimes closed. If the wound is closed, it is usually not completely closed. This is so that fluid can drain if the wound becomes infected. Often, wounds will be left open to heal. In addition to wound care, a tetanus shot may be given, if needed.    Home care    Wash your hands well with soap and warm water before and after caring for the wound. This helps lower the risk of infection.    Care for the wound as directed. If a dressing was applied to the wound, be sure to change it as directed.    If the wound bleeds, place a clean, soft cloth on the wound. Then firmly apply pressure until the bleeding stops. This may take up to 5 minutes. Do not release the pressure and look at the wound during this time.    Most wounds heal within 10 days. But an infection can occur even with proper treatment. So be sure to check the wound daily for signs of infection (see below).    Antibiotics may be prescribed. These help prevent or treat infection. If you re given antibiotics, take them as directed. Also be sure to complete the medicines.  Rabies prevention  Rabies is a virus that can be carried in certain animals. These can include domestic  animals such as dogs and cats. Pets fully vaccinated against rabies (2 shots) are at very low risk of infection. But because human rabies is almost always fatal, any biting pet should be confined for 10 days as an extra precaution. In general, if there is a risk for rabies, the following steps may need to be taken:    If someone s pet dog has bitten you, it should be kept in a secure area for the next 10 days to watch for signs of illness. (If the pet owner won t allow this, contact your local animal control center.) If the dog becomes ill or dies during that time, contact your local animal control center at once so the animal may be tested for rabies. If the dog stays healthy for the next 10 days, there is no danger of rabies in the animal or you.  ? If a stray dog bit you, contact your local animal control center. They can give information on capture, quarantine, and animal rabies testing.  ? If you can t find the animal that bit you in the next 2 days, and if rabies exists in your area, you may need to receive the rabies vaccine series. Call your healthcare provider right away. Or, return to the emergency department promptly.  ? All animal bites should be reported to the local animal control center. If you were not given a form to fill out, you can report this yourself.  Follow-up care  Follow up with your healthcare provider, or as directed.  When to seek medical advice  Call your healthcare provider right away if any of these occur:    Signs of infection:  ? Spreading redness or warmth from the wound  ? Increased pain or swelling  ? Fever of 100.4 F (38 C) or higher, or as directed by your healthcare provider  ? Colored fluid or pus draining from the wound    Signs of rabies infection:  ? Headache  ? Confusion  ? Strange behavior  ? Increased salivating and drooling  ? Seizure    Decreased ability to move any body part near the wound    Bleeding that can't be stopped after 5 minutes of firm pressure  Date Last  "Reviewed: 3/1/2017    1484-7296 [employer]. [address] [place] [address], [place] [address]. All rights reserved. This information is not intended as a substitute for professional medical care. Always follow your healthcare professional's instructions.                Follow-ups after your visit        Who to contact     If you have questions or need follow up information about today's clinic visit or your schedule please contact [facility] directly at [phone].  Normal or non-critical lab and imaging results will be communicated to you by MyChart, letter or phone within 4 business days after the clinic has received the results. If you do not hear from us within 7 days, please contact the clinic through [employer] or phone. If you have a critical or abnormal lab result, we will notify you by phone as soon as possible.  Submit refill requests through [employer] or call your pharmacy and they will forward the refill request to us. Please allow 3 business days for your refill to be completed.          Additional Information About Your Visit        [employer] Information     [employer] lets you send messages to your doctor, view your test results, renew your prescriptions, schedule appointments and more. To sign up, go to [email or web address] . Click on \"Log in\" on the left side of the screen, which will take you to the Welcome page. Then click on \"Sign up Now\" on the right side of the page.     You will be asked to enter the access code listed below, as well as some personal information. Please follow the directions to create your username and password.     Your access code is: [email or web address]  Expires: 10/17/2018  3:11 PM     Your access code will  in 90 days. If you need help or a new code, please call your [facility] clinic or [phone].        Care EveryWhere ID     This is your Care EveryWhere ID. This could be used by other organizations to access your [facility] medical " records  [id number]        Your Vitals Were     Pulse Temperature Pulse Oximetry BMI (Body Mass Index)          78 98.8  F (37.1  C) 98% 17.49 kg/m2         Blood Pressure from Last 3 Encounters:   07/19/18 135/85   08/29/17 120/70   08/23/17 132/84    Weight from Last 3 Encounters:   07/19/18 115 lb (52.2 kg)   08/29/17 114 lb (51.7 kg)   08/23/17 117 lb 6.4 oz (53.3 kg)              Today, you had the following     No orders found for display         Today's Medication Changes          These changes are accurate as of 7/16/18 11:59 PM.  If you have any questions, ask your nurse or doctor.               Start taking these medicines.        Dose/Directions    clindamycin 300 MG capsule   Commonly known as:  CLEOCIN   Used for:  Cellulitis of left upper extremity, Dog bite of finger, initial encounter        Dose:  300 mg   Take 1 capsule (300 mg) by mouth 3 times daily   Quantity:  30 capsule   Refills:  0       sulfamethoxazole-trimethoprim 800-160 MG per tablet   Commonly known as:  BACTRIM DS/SEPTRA DS   Used for:  Dog bite of finger, initial encounter, Cellulitis of left upper extremity        Dose:  1 tablet   Take 1 tablet by mouth 2 times daily   Quantity:  20 tablet   Refills:  0            Where to get your medicines      Some of these will need a paper prescription and others can be bought over the counter.  Ask your nurse if you have questions.     Bring a paper prescription for each of these medications     clindamycin 300 MG capsule    sulfamethoxazole-trimethoprim 800-160 MG per tablet                Primary Care Provider Office Phone # Fax #    Kash Eller [phone] [address] [address]        Equal Access to Services     SANTIAGO MACKEY : Terrence Cardoso, minda sunshine, sabas hector. So [facility] [phone].    ATENCIÓN: Si habla español, tiene a pugh disposición servicios gratuitos  de asistencia lingüística. Darryl dodson [phone].    We comply with applicable federal civil rights laws and [place] laws. We do not discriminate on the basis of race, color, national origin, age, disability, sex, sexual orientation, or gender identity.            Thank you!     Thank you for choosing [facility]  for your care. Our goal is always to provide you with excellent care. Hearing back from our patients is one way we can continue to improve our services. Please take a few minutes to complete the written survey that you may receive in the mail after your visit with us. Thank you!             Your Updated Medication List - Protect others around you: Learn how to safely use, store and throw away your medicines at www.disposemymeds.org.          This list is accurate as of 7/16/18 11:59 PM.  Always use your most recent med list.                   Brand Name Dispense Instructions for use Diagnosis    aspirin 325 MG tablet      Take 325 mg by mouth daily        clindamycin 300 MG capsule    CLEOCIN    30 capsule    Take 1 capsule (300 mg) by mouth 3 times daily    Cellulitis of left upper extremity, Dog bite of finger, initial encounter       flecainide 100 MG tablet    TAMBOCOR    180 tablet    Take 1 tablet (100 mg) by mouth 2 times daily    Atrial fibrillation and flutter (H)       metroNIDAZOLE 1 % gel    METROGEL    60 g    apply qhs after washing face    Rosacea       sulfamethoxazole-trimethoprim 800-160 MG per tablet    BACTRIM DS/SEPTRA DS    20 tablet    Take 1 tablet by mouth 2 times daily    Dog bite of finger, initial encounter, Cellulitis of left upper extremity

## 2018-07-17 NOTE — PATIENT INSTRUCTIONS
Discharge Instructions for Cellulitis  You have been diagnosed with cellulitis. This is an infection in the deepest layer of the skin. In some cases, the infection also affects the muscle. Cellulitis is caused by bacteria. The bacteria can enter the body through broken skin. This can happen with a cut, scratch, animal bite, or an insect bite that has been scratched. You may have been treated in the hospital with antibiotics and fluids. You will likely be given a prescription for antibiotics to take at home. This sheet will help you take care of yourself at home.  Home care  When you are home:    Take the prescribed antibiotic medicine you are given as directed until it is gone. Take it even if you feel better. It treats the infection and stops it from returning. Not taking all the medicine can make future infections hard to treat.    Keep the infected area clean.    When possible, raise the infected area above the level of your heart. This helps keep swelling down.    Talk with your healthcare provider if you are in pain. Ask what kind of over-the-counter medicine you can take for pain.    Apply clean bandages as advised.    Take your temperature once a day for a week.    Wash your hands often to prevent spreading the infection.  In the future, wash your hands before and after you touch cuts, scratches, or bandages. This will help prevent infection.   When to call your healthcare provider  Call your healthcare provider immediately if you have any of the following:    Difficulty or pain when moving the joints above or below the infected area    Discharge or pus draining from the area    Fever of 100.4 F (38 C) or higher, or as directed by your healthcare provider    Pain that gets worse in or around the infected     Redness that gets worse in or around the infected area, particularly if the area of redness expands to a wider area    Shaking chills    Swelling of the infected area    Vomiting   Date Last Reviewed:  8/1/2016 2000-2017 [employer]. [address] [place] [address], [place] [address]. All rights reserved. This information is not intended as a substitute for professional medical care. Always follow your healthcare professional's instructions.        Dog Bite  A dog bite can cause a wound deep enough to break the skin. In such cases, the wound is cleaned and sometimes closed. If the wound is closed, it is usually not completely closed. This is so that fluid can drain if the wound becomes infected. Often, wounds will be left open to heal. In addition to wound care, a tetanus shot may be given, if needed.    Home care    Wash your hands well with soap and warm water before and after caring for the wound. This helps lower the risk of infection.    Care for the wound as directed. If a dressing was applied to the wound, be sure to change it as directed.    If the wound bleeds, place a clean, soft cloth on the wound. Then firmly apply pressure until the bleeding stops. This may take up to 5 minutes. Do not release the pressure and look at the wound during this time.    Most wounds heal within 10 days. But an infection can occur even with proper treatment. So be sure to check the wound daily for signs of infection (see below).    Antibiotics may be prescribed. These help prevent or treat infection. If you re given antibiotics, take them as directed. Also be sure to complete the medicines.  Rabies prevention  Rabies is a virus that can be carried in certain animals. These can include domestic animals such as dogs and cats. Pets fully vaccinated against rabies (2 shots) are at very low risk of infection. But because human rabies is almost always fatal, any biting pet should be confined for 10 days as an extra precaution. In general, if there is a risk for rabies, the following steps may need to be taken:    If someone s pet dog has bitten you, it should be kept in a secure area for the next 10 days to watch for signs  of illness. (If the pet owner won t allow this, contact your local animal control center.) If the dog becomes ill or dies during that time, contact your local animal control center at once so the animal may be tested for rabies. If the dog stays healthy for the next 10 days, there is no danger of rabies in the animal or you.  ? If a stray dog bit you, contact your local animal control center. They can give information on capture, quarantine, and animal rabies testing.  ? If you can t find the animal that bit you in the next 2 days, and if rabies exists in your area, you may need to receive the rabies vaccine series. Call your healthcare provider right away. Or, return to the emergency department promptly.  ? All animal bites should be reported to the local animal control center. If you were not given a form to fill out, you can report this yourself.  Follow-up care  Follow up with your healthcare provider, or as directed.  When to seek medical advice  Call your healthcare provider right away if any of these occur:    Signs of infection:  ? Spreading redness or warmth from the wound  ? Increased pain or swelling  ? Fever of 100.4 F (38 C) or higher, or as directed by your healthcare provider  ? Colored fluid or pus draining from the wound    Signs of rabies infection:  ? Headache  ? Confusion  ? Strange behavior  ? Increased salivating and drooling  ? Seizure    Decreased ability to move any body part near the wound    Bleeding that can't be stopped after 5 minutes of firm pressure  Date Last Reviewed: 3/1/2017    7266-4364 [employer]. [address] [place] [address], [place] [address]. All rights reserved. This information is not intended as a substitute for professional medical care. Always follow your healthcare professional's instructions.

## 2018-07-19 VITALS
HEART RATE: 78 BPM | OXYGEN SATURATION: 98 % | DIASTOLIC BLOOD PRESSURE: 85 MMHG | BODY MASS INDEX: 17.49 KG/M2 | TEMPERATURE: 98.8 F | SYSTOLIC BLOOD PRESSURE: 135 MMHG | WEIGHT: 115 LBS

## 2018-07-19 NOTE — PROGRESS NOTES
SUBJECTIVE:  Gavin House is a 72 year old male who presents with a chief complaint of an animal bite on the left hand, finger.  He was bitten by a dog 3-4 days ago.   Cicumstances of bite: unprovoked attack.  Severity: bite with skin break.  Animal's immunizations up to date   Associated symptoms: immediate pain    last tetanus booster within 10 years    Past Medical History:   Diagnosis Date     Allergic rhinitis, cause unspecified      Diverticulosis of colon     Diverticulitis 5/2010     Mild intermittent asthma      Paroxysmal atrial fibrillation (H) 05/09/2016     PVC (premature ventricular contraction)     intermittent bigeminy     Rosacea      Scoliosis (and kyphoscoliosis), idiopathic      Allergies   Allergen Reactions     Penicillins      Social History   Substance Use Topics     Smoking status: Former Smoker     Smokeless tobacco: Never Used      Comment: Quit 1993     Alcohol use 0.0 oz/week     0 Standard drinks or equivalent per week      Comment: 20 drinks per week       ROS:  CONSTITUTIONAL:NEGATIVE for fever, chills, change in weight  INTEGUMENTARY/SKIN: NEGATIVE for worrisome rashes, moles or lesions  MUSCULOSKELETAL: POSITIVE  for left hand, tenderness, swelling and erythema  VASC: POSITIVE for warm extremities  LYMPH: Negative for swollen lymph nodes  NEURO: NEGATIVE for weakness, dizziness or paresthesias    OBJECTIVE:  /85  Pulse 78  Temp 98.8  F (37.1  C)  Wt 115 lb (52.2 kg)  SpO2 98%  BMI 17.49 kg/m2  GENERAL: healthy, alert no acute distress  SKIN: positive for erythema, swelling wrist and fingers  MS:extremities normal- no gross deformities noted,  FROM noted in all extremities  NEURO: Normal strength and tone, sensory exam grossly normal,  normal speech and mentation  SKIN: Positive for left hand, finger erythema, swelling, tenderness  LYMPH: Negative for lymph node swelling    ASSESSMENT/PLAN:      ICD-10-CM    1. Dog bite of finger, initial encounter S61.259A clindamycin  (CLEOCIN) 300 MG capsule    W54.0XXA sulfamethoxazole-trimethoprim (BACTRIM DS/SEPTRA DS) 800-160 MG per tablet   2. Cellulitis of left upper extremity L03.114 clindamycin (CLEOCIN) 300 MG capsule     sulfamethoxazole-trimethoprim (BACTRIM DS/SEPTRA DS) 800-160 MG per tablet       Orders Placed This Encounter     clindamycin (CLEOCIN) 300 MG capsule     sulfamethoxazole-trimethoprim (BACTRIM DS/SEPTRA DS) 800-160 MG per tablet     Monitor symptoms very closely  Go to the ED if symptoms worsen  See orders in epic

## 2018-08-31 ENCOUNTER — OFFICE VISIT (OUTPATIENT)
Dept: CARDIOLOGY | Facility: CLINIC | Age: 73
End: 2018-08-31
Attending: INTERNAL MEDICINE
Payer: COMMERCIAL

## 2018-08-31 VITALS
DIASTOLIC BLOOD PRESSURE: 90 MMHG | SYSTOLIC BLOOD PRESSURE: 160 MMHG | BODY MASS INDEX: 17.59 KG/M2 | HEART RATE: 68 BPM | WEIGHT: 116.1 LBS | HEIGHT: 68 IN

## 2018-08-31 DIAGNOSIS — Z51.81 ENCOUNTER FOR MONITORING FLECAINIDE THERAPY: ICD-10-CM

## 2018-08-31 DIAGNOSIS — Z79.899 ENCOUNTER FOR MONITORING FLECAINIDE THERAPY: ICD-10-CM

## 2018-08-31 DIAGNOSIS — I48.0 PAROXYSMAL ATRIAL FIBRILLATION (H): Primary | ICD-10-CM

## 2018-08-31 PROCEDURE — 99213 OFFICE O/P EST LOW 20 MIN: CPT | Performed by: NURSE PRACTITIONER

## 2018-08-31 PROCEDURE — 93000 ELECTROCARDIOGRAM COMPLETE: CPT | Performed by: NURSE PRACTITIONER

## 2018-08-31 RX ORDER — FLECAINIDE ACETATE 100 MG/1
100 TABLET ORAL 2 TIMES DAILY
Qty: 180 TABLET | Refills: 3 | Status: SHIPPED | OUTPATIENT
Start: 2018-08-31 | End: 2019-09-09

## 2018-08-31 NOTE — PROGRESS NOTES
Service Date: 08/31/2018      HISTORY OF PRESENT ILLNESS:  Mr. House is a delightful 72-year-old gentleman that I am having the pleasure of meeting today.  He has a history of symptomatic paroxysmal atrial fibrillation treated with flecainide at 100 mg b.i.d.  He has had no reoccurrence of AFib while on this medication.  He denies any chest discomfort, shortness of breath, lightheadedness or dizziness.      A 12-lead EKG today is stable, showing sinus rhythm at 70 beats per minute with a stable QRS of 100 milliseconds.  When compared to his previous EKG, no acute changes are noted.      He did undergo a treadmill for proarrhythmia back in 2016, which was negative for inducible ischemia and no ventricular arrhythmias were noted.      Unfortunately, at today's visit his blood pressure is slightly elevated.  The first reading was 174/80, second reading 156/82, and last reading 160/90.  The patient states that he was very anxious about coming in this morning.  He is always nervous that we are going to have him do another treadmill study or additional diagnostic testing.  In fact, he states that his wife told him he needed to settle down.  He has no history of hypertension.  He was just seen at Dr. Eller's office a week ago and was normotensive.      Echocardiogram completed 06/2016 showed grade I diastolic dysfunction with an EF of 55%-60% with no significant valvular insufficiencies.  All other review of systems are noted below.      ASSESSMENT AND PLAN:  Mr. House is a delightful 72-year-old gentleman here today for his annual followup.  He is stable on flecainide at 100 mg b.i.d.  His EKG is stable.      I have asked that he spot check his blood pressure.  I would like him to see Dr. Eller in 2-3 months to have his pressure reassessed.  His current CHADS-VASc score is 1 (age).  He is on aspirin therapy.  I did not want to place the patient on antihypertensive medications since this is his first elevated reading.       He will follow up with Dr. Verma next year with an annual EKG.  He will contact us in the interim if there are questions or concerns.      Thank you for including us in his care.      cc:   FAY Eller MD    [address]  [address]         WERO PITTS NP             D: 2018   T: 2018   MT: LUI      Name:     ZOIE DEL VALLE   MRN:      [id number]        Account:      [id number]   :      1945           Service Date: 2018      Document: [id number]

## 2018-08-31 NOTE — LETTER
8/31/2018      Kash Eller MD  [address] [address]      RE: Gavin House       Dear Colleague,    I had the pleasure of seeing Gavin House in the [facility].    Service Date: 08/31/2018      HISTORY OF PRESENT ILLNESS:  Mr. House is a delightful 72-year-old gentleman that I am having the pleasure of meeting today.  He has a history of symptomatic paroxysmal atrial fibrillation treated with flecainide at 100 mg b.i.d.  He has had no reoccurrence of AFib while on this medication.  He denies any chest discomfort, shortness of breath, lightheadedness or dizziness.      A 12-lead EKG today is stable, showing sinus rhythm at 70 beats per minute with a stable QRS of 100 milliseconds.  When compared to his previous EKG, no acute changes are noted.      He did undergo a treadmill for proarrhythmia back in 2016, which was negative for inducible ischemia and no ventricular arrhythmias were noted.      Unfortunately, at today's visit his blood pressure is slightly elevated.  The first reading was 174/80, second reading 156/82, and last reading 160/90.  The patient states that he was very anxious about coming in this morning.  He is always nervous that we are going to have him do another treadmill study or additional diagnostic testing.  In fact, he states that his wife told him he needed to settle down.  He has no history of hypertension.  He was just seen at Dr. Eller's office a week ago and was normotensive.      Echocardiogram completed 06/2016 showed grade I diastolic dysfunction with an EF of 55%-60% with no significant valvular insufficiencies.  All other review of systems are noted below.      ASSESSMENT AND PLAN:  Mr. House is a delightful 72-year-old gentleman here today for his annual followup.  He is stable on flecainide at 100 mg b.i.d.  His EKG is stable.      I have asked that he spot check his blood pressure.  I would like him to see Dr. Eller in  2-3 months to have his pressure reassessed.  His current CHADS-VASc score is 1 (age).  He is on aspirin therapy.  I did not want to place the patient on antihypertensive medications since this is his first elevated reading.      He will follow up with Dr. Verma next year with an annual EKG.  He will contact us in the interim if there are questions or concerns.      Thank you for including us in his care.      cc:   FAY Eller MD    [facility]    [address] [place] [address]         RAMONA WALTERS NP             D: 2018   T: 2018   MT: LUI      Name:     ZOIE DEL VALLE   MRN:      [id number]        Account:      [id number]   :      1945           Service Date: 2018      Document: [id number]         Outpatient Encounter Prescriptions as of 2018   Medication Sig Dispense Refill     aspirin 325 MG tablet Take 325 mg by mouth daily       flecainide (TAMBOCOR) 100 MG tablet Take 1 tablet (100 mg) by mouth 2 times daily 180 tablet 3     metroNIDAZOLE (METROGEL) 1 % gel apply qhs after washing face 60 g 3     clindamycin (CLEOCIN) 300 MG capsule Take 1 capsule (300 mg) by mouth 3 times daily (Patient not taking: Reported on 2018) 30 capsule 0     sulfamethoxazole-trimethoprim (BACTRIM DS/SEPTRA DS) 800-160 MG per tablet Take 1 tablet by mouth 2 times daily (Patient not taking: Reported on 2018) 20 tablet 0     [DISCONTINUED] flecainide (TAMBOCOR) 100 MG tablet Take 1 tablet (100 mg) by mouth 2 times daily 180 tablet 2     No facility-administered encounter medications on file as of 2018.        Again, thank you for allowing me to participate in the care of your patient.      Sincerely,    Ramona Walters NP, APRN [facility]

## 2018-08-31 NOTE — MR AVS SNAPSHOT
"              After Visit Summary   8/31/2018    Gavin House    MRN: [id number]           Patient Information     Date Of Birth          1945        Visit Information        Provider Department      8/31/2018 8:00 AM Ramona Walters APRN CNP [facility]        Today's Diagnoses     Paroxysmal atrial fibrillation (H)    -  1    Encounter for monitoring flecainide therapy          Care Instructions    Please have your blood pressure rechecked in 2-3 months at Dr. Eller's office  See  with an EKG next year          Follow-ups after your visit        Who to contact     If you have questions or need follow up information about today's clinic visit or your schedule please contact [facility] directly at [phone].  Normal or non-critical lab and imaging results will be communicated to you by MyChart, letter or phone within 4 business days after the clinic has received the results. If you do not hear from us within 7 days, please contact the clinic through MyChart or phone. If you have a critical or abnormal lab result, we will notify you by phone as soon as possible.  Submit refill requests through [employer] or call your pharmacy and they will forward the refill request to us. Please allow 3 business days for your refill to be completed.          Additional Information About Your Visit        Care EveryWhere ID     This is your Care EveryWhere ID. This could be used by other organizations to access your [facility] medical records  [id number]        Your Vitals Were     Pulse Height BMI (Body Mass Index)             68 1.727 m (5' 8\") 17.65 kg/m2          Blood Pressure from Last 3 Encounters:   08/31/18 160/90   07/19/18 135/85   08/29/17 120/70    Weight from Last 3 Encounters:   08/31/18 52.7 kg (116 lb 1.6 oz)   07/19/18 52.2 kg (115 lb)   08/29/17 51.7 kg (114 lb)              We Performed the Following     EKG 12-lead complete " w/read - Clinics (performed today)     Follow-Up with Cardiac Advanced Practice Provider          Where to get your medicines      These medications were sent to [facility] [address] [place] [address] [address] [facility]  700 [facility] [address]     Phone:  [phone]     flecainide 100 MG tablet          Primary Care Provider Office Phone # Fax #    Kash Eller [phone] [address] [address]        Equal Access to Services     MAHNAZ MACKEY : Hadii aad ku hadasho Soomaali, waaxda luqadaha, qaybta kaalmada adeegyada, waxay idiin hayaan adeeg kharash magdaleno . So [facility] [phone].    ATENCIÓN: Si habla español, tiene a pugh disposición servicios gratuitos de asistencia lingüística. [facility] [phone].    We comply with applicable federal civil rights laws and [place] laws. We do not discriminate on the basis of race, color, national origin, age, disability, sex, sexual orientation, or gender identity.            Thank you!     Thank you for choosing [facility]  for your care. Our goal is always to provide you with excellent care. Hearing back from our patients is one way we can continue to improve our services. Please take a few minutes to complete the written survey that you may receive in the mail after your visit with us. Thank you!             Your Updated Medication List - Protect others around you: Learn how to safely use, store and throw away your medicines at www.disposemymeds.org.          This list is accurate as of 8/31/18  8:28 AM.  Always use your most recent med list.                   Brand Name Dispense Instructions for use Diagnosis    aspirin 325 MG tablet      Take 325 mg by mouth daily        clindamycin 300 MG capsule    CLEOCIN    30 capsule    Take 1 capsule (300 mg) by mouth 3 times daily    Cellulitis of left upper extremity, Dog bite of finger, initial encounter        flecainide 100 MG tablet    TAMBOCOR    180 tablet    Take 1 tablet (100 mg) by mouth 2 times daily    Paroxysmal atrial fibrillation (H)       metroNIDAZOLE 1 % gel    METROGEL    60 g    apply qhs after washing face    Rosacea       sulfamethoxazole-trimethoprim 800-160 MG per tablet    BACTRIM DS/SEPTRA DS    20 tablet    Take 1 tablet by mouth 2 times daily    Dog bite of finger, initial encounter, Cellulitis of left upper extremity

## 2018-08-31 NOTE — PROGRESS NOTES
HPI and Plan: #[id number]  See dictation    Orders Placed This Encounter   Procedures     EKG 12-lead complete w/read - Clinics (performed today)       Orders Placed This Encounter   Medications     flecainide (TAMBOCOR) 100 MG tablet     Sig: Take 1 tablet (100 mg) by mouth 2 times daily     Dispense:  180 tablet     Refill:  3       Medications Discontinued During This Encounter   Medication Reason     flecainide (TAMBOCOR) 100 MG tablet Reorder         Encounter Diagnoses   Name Primary?     Paroxysmal atrial fibrillation (H) Yes     Encounter for monitoring flecainide therapy        CURRENT MEDICATIONS:  Current Outpatient Prescriptions   Medication Sig Dispense Refill     aspirin 325 MG tablet Take 325 mg by mouth daily       flecainide (TAMBOCOR) 100 MG tablet Take 1 tablet (100 mg) by mouth 2 times daily 180 tablet 3     metroNIDAZOLE (METROGEL) 1 % gel apply qhs after washing face 60 g 3     clindamycin (CLEOCIN) 300 MG capsule Take 1 capsule (300 mg) by mouth 3 times daily (Patient not taking: Reported on 8/31/2018) 30 capsule 0     sulfamethoxazole-trimethoprim (BACTRIM DS/SEPTRA DS) 800-160 MG per tablet Take 1 tablet by mouth 2 times daily (Patient not taking: Reported on 8/31/2018) 20 tablet 0     [DISCONTINUED] flecainide (TAMBOCOR) 100 MG tablet Take 1 tablet (100 mg) by mouth 2 times daily 180 tablet 2       ALLERGIES     Allergies   Allergen Reactions     Penicillins        PAST MEDICAL HISTORY:  Past Medical History:   Diagnosis Date     Allergic rhinitis, cause unspecified      Diverticulosis of colon     Diverticulitis 5/2010     Mild intermittent asthma      Paroxysmal atrial fibrillation (H) 05/09/2016     PVC (premature ventricular contraction)     intermittent bigeminy     Rosacea      Scoliosis (and kyphoscoliosis), idiopathic        PAST SURGICAL HISTORY:  Past Surgical History:   Procedure Laterality Date     C NONSPECIFIC PROCEDURE  1974    right inguinal herniorraphy     C NONSPECIFIC  "PROCEDURE  age 16    L inguinal herniorraphy       FAMILY HISTORY:  Family History   Problem Relation Age of Onset     Cancer Father      Type unknown; d: age 76     HEART DISEASE Mother      \"enlarged heart\"; d: age 75     C.A.D. Brother      bypass surgery; b: 1925       SOCIAL HISTORY:  Social History     Social History     Marital status:      Spouse name: N/A     Number of children: 2     Years of education: N/A     Occupational History      [employer]     Social History Main Topics     Smoking status: Former Smoker     Packs/day: 0.10     Years: 23.00     Types: Cigarettes     Start date: 1967     Quit date: 1990     Smokeless tobacco: Never Used      Comment: 1990, smoked 1-2  cig day 23 years     Alcohol use 0.0 oz/week     0 Standard drinks or equivalent per week      Comment: 2-4 drinks day, beer/bloody robert     Drug use: No     Sexual activity: Yes     Partners: Female     Other Topics Concern     Parent/Sibling W/ Cabg, Mi Or Angioplasty Before 65f 55m? No     Social History Narrative       Review of Systems:  Skin:    rash rosacea   Eyes:  Positive for glasses;tearing    ENT:  Positive for nasal congestion    Respiratory:  Negative       Cardiovascular:  Negative;chest pain;syncope or near-syncope;cyanosis;lightheadedness;dizziness;fatigue;edema Positive for palpitations 1-2X over last year  Gastroenterology: Negative      Genitourinary:  Negative      Musculoskeletal:  Negative      Neurologic:  Negative      Psychiatric:  Negative      Heme/Lymph/Imm:  Positive for allergies    Endocrine:  Negative        Physical Exam:  Vitals: /90  Pulse 68  Ht 1.727 m (5' 8\")  Wt 52.7 kg (116 lb 1.6 oz)  BMI 17.65 kg/m2    Constitutional:  cooperative, alert and oriented, well developed, well nourished, in no acute distress        Skin:  warm and dry to the touch, no apparent skin lesions or masses noted          Head:  normocephalic, no masses or lesions        Eyes:  pupils equal and round, " conjunctivae and lids unremarkable, sclera white, no xanthalasma, EOMS intact, no nystagmus        Lymph:      ENT:  no pallor or cyanosis, dentition good        Neck:           Respiratory:  normal breath sounds, clear to auscultation, normal A-P diameter, normal symmetry, normal respiratory excursion, no use of accessory muscles         Cardiac: regular rhythm, normal S1/S2, no S3 or S4, apical impulse not displaced, no murmurs, gallops or rubs                not assessed this visit                                        GI:  abdomen soft;BS normoactive        Extremities and Muscular Skeletal:  no deformities, clubbing, cyanosis, erythema observed;no edema              Neurological:  no gross motor deficits        Psych:  Alert and Oriented x 3;affect appropriate, oriented to time, person and place        CC  Lissette Verma MD  [address]  [address]  [place] [address]

## 2018-08-31 NOTE — PATIENT INSTRUCTIONS
Please have your blood pressure rechecked in 2-3 months at Dr. Eller's office  See  with an EKG next year

## 2018-08-31 NOTE — LETTER
8/31/2018    Kash Eller MD  [address] [place] [address]    RE: Gavin House       Dear Colleague,    I had the pleasure of seeing Gavin House in the [facility].    HPI and Plan: #[id number]  See dictation    Orders Placed This Encounter   Procedures     EKG 12-lead complete w/read - Clinics (performed today)       Orders Placed This Encounter   Medications     flecainide (TAMBOCOR) 100 MG tablet     Sig: Take 1 tablet (100 mg) by mouth 2 times daily     Dispense:  180 tablet     Refill:  3       Medications Discontinued During This Encounter   Medication Reason     flecainide (TAMBOCOR) 100 MG tablet Reorder         Encounter Diagnoses   Name Primary?     Paroxysmal atrial fibrillation (H) Yes     Encounter for monitoring flecainide therapy        CURRENT MEDICATIONS:  Current Outpatient Prescriptions   Medication Sig Dispense Refill     aspirin 325 MG tablet Take 325 mg by mouth daily       flecainide (TAMBOCOR) 100 MG tablet Take 1 tablet (100 mg) by mouth 2 times daily 180 tablet 3     metroNIDAZOLE (METROGEL) 1 % gel apply qhs after washing face 60 g 3     clindamycin (CLEOCIN) 300 MG capsule Take 1 capsule (300 mg) by mouth 3 times daily (Patient not taking: Reported on 8/31/2018) 30 capsule 0     sulfamethoxazole-trimethoprim (BACTRIM DS/SEPTRA DS) 800-160 MG per tablet Take 1 tablet by mouth 2 times daily (Patient not taking: Reported on 8/31/2018) 20 tablet 0     [DISCONTINUED] flecainide (TAMBOCOR) 100 MG tablet Take 1 tablet (100 mg) by mouth 2 times daily 180 tablet 2       ALLERGIES     Allergies   Allergen Reactions     Penicillins        PAST MEDICAL HISTORY:  Past Medical History:   Diagnosis Date     Allergic rhinitis, cause unspecified      Diverticulosis of colon     Diverticulitis 5/2010     Mild intermittent asthma      Paroxysmal atrial fibrillation (H) 05/09/2016     PVC (premature ventricular contraction)     intermittent bigeminy      "Rosacea      Scoliosis (and kyphoscoliosis), idiopathic        PAST SURGICAL HISTORY:  Past Surgical History:   Procedure Laterality Date     C NONSPECIFIC PROCEDURE  1974    right inguinal herniorraphy     C NONSPECIFIC PROCEDURE  age 16    L inguinal herniorraphy       FAMILY HISTORY:  Family History   Problem Relation Age of Onset     Cancer Father      Type unknown; d: age 76     HEART DISEASE Mother      \"enlarged heart\"; d: age 75     C.A.D. Brother      bypass surgery; b: 1925       SOCIAL HISTORY:  Social History     Social History     Marital status:      Spouse name: N/A     Number of children: 2     Years of education: N/A     Occupational History      [employer]     Social History Main Topics     Smoking status: Former Smoker     Packs/day: 0.10     Years: 23.00     Types: Cigarettes     Start date: 1967     Quit date: 1990     Smokeless tobacco: Never Used      Comment: 1990, smoked 1-2  cig day 23 years     Alcohol use 0.0 oz/week     0 Standard drinks or equivalent per week      Comment: 2-4 drinks day, beer/bloody robert     Drug use: No     Sexual activity: Yes     Partners: Female     Other Topics Concern     Parent/Sibling W/ Cabg, Mi Or Angioplasty Before 65f 55m? No     Social History Narrative       Review of Systems:  Skin:    rash rosacea   Eyes:  Positive for glasses;tearing    ENT:  Positive for nasal congestion    Respiratory:  Negative       Cardiovascular:  Negative;chest pain;syncope or near-syncope;cyanosis;lightheadedness;dizziness;fatigue;edema Positive for palpitations 1-2X over last year  Gastroenterology: Negative      Genitourinary:  Negative      Musculoskeletal:  Negative      Neurologic:  Negative      Psychiatric:  Negative      Heme/Lymph/Imm:  Positive for allergies    Endocrine:  Negative        Physical Exam:  Vitals: /90  Pulse 68  Ht 1.727 m (5' 8\")  Wt 52.7 kg (116 lb 1.6 oz)  BMI 17.65 kg/m2    Constitutional:  cooperative, alert and oriented, " well developed, well nourished, in no acute distress        Skin:  warm and dry to the touch, no apparent skin lesions or masses noted          Head:  normocephalic, no masses or lesions        Eyes:  pupils equal and round, conjunctivae and lids unremarkable, sclera white, no xanthalasma, EOMS intact, no nystagmus        Lymph:      ENT:  no pallor or cyanosis, dentition good        Neck:           Respiratory:  normal breath sounds, clear to auscultation, normal A-P diameter, normal symmetry, normal respiratory excursion, no use of accessory muscles         Cardiac: regular rhythm, normal S1/S2, no S3 or S4, apical impulse not displaced, no murmurs, gallops or rubs                not assessed this visit                                        GI:  abdomen soft;BS normoactive        Extremities and Muscular Skeletal:  no deformities, clubbing, cyanosis, erythema observed;no edema              Neurological:  no gross motor deficits        Psych:  Alert and Oriented x 3;affect appropriate, oriented to time, person and place        CC  Lissette Verma MD  [address]  [address]  [place] [address]                Thank you for allowing me to participate in the care of your patient.      Sincerely,     Ramona Walters, NP, APRN CNP     [facility]    cc:   Lissette Verma MD  [address]  [address]  [place] [address]

## 2018-09-12 ENCOUNTER — OFFICE VISIT (OUTPATIENT)
Dept: INTERNAL MEDICINE | Facility: CLINIC | Age: 73
End: 2018-09-12
Payer: COMMERCIAL

## 2018-09-12 VITALS
BODY MASS INDEX: 17.65 KG/M2 | OXYGEN SATURATION: 98 % | HEART RATE: 76 BPM | TEMPERATURE: 97.8 F | RESPIRATION RATE: 16 BRPM | WEIGHT: 116.1 LBS | DIASTOLIC BLOOD PRESSURE: 90 MMHG | SYSTOLIC BLOOD PRESSURE: 140 MMHG

## 2018-09-12 DIAGNOSIS — I48.0 PAROXYSMAL A-FIB (H): ICD-10-CM

## 2018-09-12 DIAGNOSIS — I10 ESSENTIAL HYPERTENSION: Primary | ICD-10-CM

## 2018-09-12 PROCEDURE — 99214 OFFICE O/P EST MOD 30 MIN: CPT | Performed by: INTERNAL MEDICINE

## 2018-09-12 PROCEDURE — 99207 C PAF COMPLETED  NO CHARGE: CPT | Performed by: INTERNAL MEDICINE

## 2018-09-12 RX ORDER — LISINOPRIL 10 MG/1
10 TABLET ORAL DAILY
Qty: 30 TABLET | Refills: 3 | Status: SHIPPED | OUTPATIENT
Start: 2018-09-12 | End: 2018-10-08

## 2018-09-12 NOTE — MR AVS SNAPSHOT
After Visit Summary   9/12/2018    Gavin House    MRN: [id number]           Patient Information     Date Of Birth          1945        Visit Information        Provider Department      9/12/2018 3:15 PM Kash Eller MD [facility]        Today's Diagnoses     Essential hypertension    -  1    Paroxysmal a-fib (H)          Care Instructions    - Start taking lisinopril, once daily.  (Prescription has been sent to your pharmacy)    - Continue all other medications as you have been.    - Please follow-up with me in clinic in 1 month.  - Please come in for fasting labs, a few days prior to the appointment with me.  You may schedule appointments at our , through [facility], or by calling [phone].             Follow-ups after your visit        Follow-up notes from your care team     Return in about 4 weeks (around 10/10/2018) for Blood Pressure Check, with pre-visit fasting lab.      Future tests that were ordered for you today     Open Future Orders        Priority Expected Expires Ordered    TSH with free T4 reflex Routine  12/11/2018 9/12/2018    UA with Microscopic reflex to Culture Routine  12/11/2018 9/12/2018    Lipid panel reflex to direct LDL Fasting Routine  12/11/2018 9/12/2018    Basic metabolic panel Routine  12/11/2018 9/12/2018            Who to contact     If you have questions or need follow up information about today's clinic visit or your schedule please contact [facility] directly at [phone].  Normal or non-critical lab and imaging results will be communicated to you by [facility], letter or phone within 4 business days after the clinic has received the results. If you do not hear from us within 7 days, please contact the clinic through [facility] or phone. If you have a critical or abnormal lab result, we will notify you by phone as soon as possible.  Submit refill requests through [employer] or call your  pharmacy and they will forward the refill request to us. Please allow 3 business days for your refill to be completed.          Additional Information About Your Visit        Care EveryWhere ID     This is your Care EveryWhere ID. This could be used by other organizations to access your [place] medical records  [id number]        Your Vitals Were     Pulse Temperature Respirations Pulse Oximetry BMI (Body Mass Index)       76 97.8  F (36.6  C) (Oral) 16 98% 17.65 kg/m2        Blood Pressure from Last 3 Encounters:   09/12/18 140/90   08/31/18 160/90   07/19/18 135/85    Weight from Last 3 Encounters:   09/12/18 116 lb 1.6 oz (52.7 kg)   08/31/18 116 lb 1.6 oz (52.7 kg)   07/19/18 115 lb (52.2 kg)              We Performed the Following     PAF COMPLETED          Today's Medication Changes          These changes are accurate as of 9/12/18  3:42 PM.  If you have any questions, ask your nurse or doctor.               Start taking these medicines.        Dose/Directions    lisinopril 10 MG tablet   Commonly known as:  PRINIVIL/ZESTRIL   Used for:  Essential hypertension   Started by:  Kash Eller MD        Dose:  10 mg   Take 1 tablet (10 mg) by mouth daily   Quantity:  30 tablet   Refills:  3         Stop taking these medicines if you haven't already. Please contact your care team if you have questions.     metroNIDAZOLE 1 % gel   Commonly known as:  METROGEL   Stopped by:  Kash Eller MD                Where to get your medicines      These medications were sent to [facility] [address] [place] [address] [address] [address]     Phone:  [phone]     lisinopril 10 MG tablet                Primary Care Provider Office Phone # Fax #    Kash Eller [phone]       [address] [address]        Equal Access to Services     MAHNAZ MACKEY : Terrence Cardoso, minda sunshine, laina ace  sabas milleranibal barrosaan ah. [facility] [phone].    ATENCIÓN: Si habla tobin, tiene a pugh disposición servicios gratuitos de asistencia lingüística. Darryl al [phone].    We comply with applicable federal civil rights laws and [place] laws. We do not discriminate on the basis of race, color, national origin, age, disability, sex, sexual orientation, or gender identity.            Thank you!     Thank you for choosing [facility]  for your care. Our goal is always to provide you with excellent care. Hearing back from our patients is one way we can continue to improve our services. Please take a few minutes to complete the written survey that you may receive in the mail after your visit with us. Thank you!             Your Updated Medication List - Protect others around you: Learn how to safely use, store and throw away your medicines at www.disposemymeds.org.          This list is accurate as of 9/12/18  3:42 PM.  Always use your most recent med list.                   Brand Name Dispense Instructions for use Diagnosis    aspirin 325 MG tablet      Take 325 mg by mouth daily        flecainide 100 MG tablet    TAMBOCOR    180 tablet    Take 1 tablet (100 mg) by mouth 2 times daily    Paroxysmal atrial fibrillation (H)       lisinopril 10 MG tablet    PRINIVIL/ZESTRIL    30 tablet    Take 1 tablet (10 mg) by mouth daily    Essential hypertension

## 2018-09-12 NOTE — PROGRESS NOTES
SUBJECTIVE:   Gavin House is a 72 year old male who presents to clinic today for the following health issues:      Pt went to see cardiologist and noticed that his bp is high so recommended he see his PCP to follow up        Problem list and histories reviewed & adjusted, as indicated.  Additional history: as documented    Patient continues to follow with cardiology for his history of paroxysmal atrial fibrillation, maintained in sinus rhythm on flecainide.  Blood pressure was in the 160s systolic on last visit with cardiology, today's BP noted.    Patient has never been treated for high blood pressure, his weight has been very stable, he does exercise some on a weekly basis.  Drinks alcohol maybe a couple times weekly, usually 2 Bloody Katie with some beer, each 'episode.'  Does not smoke.  No history of hyperlipidemia or diabetes.    Reviewed and updated as needed this visit by clinical staff  Tobacco  Allergies  Meds  Problems       Reviewed and updated as needed this visit by Provider  Allergies  Meds  Problems         ROS:  Constitutional, HEENT, cardiovascular, pulmonary, GI, , musculoskeletal, neuro, skin, endocrine and psych systems are negative, except as otherwise noted.    OBJECTIVE:     /90  Pulse 76  Temp 97.8  F (36.6  C) (Oral)  Resp 16  Wt 116 lb 1.6 oz (52.7 kg)  SpO2 98%  BMI 17.65 kg/m2  Body mass index is 17.65 kg/(m^2).  GENERAL: healthy, alert and no distress  NECK: no adenopathy, no asymmetry, masses, or scars and thyroid normal to palpation  RESP: lungs clear to auscultation - no rales, rhonchi or wheezes  CV: regular rate and rhythm, normal S1 S2, no S3 or S4, no murmur, click or rub, no peripheral edema and peripheral pulses strong  ABDOMEN: soft, nontender, no hepatosplenomegaly, no masses and bowel sounds normal  MS: no gross musculoskeletal defects noted, no edema        ASSESSMENT/PLAN:     1. Essential hypertension  Discussed rationale for treating high blood  pressure, including prevention of atherosclerosis and end-organ damage.  Reiterated the importance of continuing healthy diet, regular exercise, limitation of alcohol intake as a means of controlling blood pressure.  Will start low-dose lisinopril, follow-up in 1 month for recheck, with fasting surveillance labs.  Potential side effects discussed in detail.  - lisinopril (PRINIVIL/ZESTRIL) 10 MG tablet; Take 1 tablet (10 mg) by mouth daily  Dispense: 30 tablet; Refill: 3  - Lipid panel reflex to direct LDL Fasting; Future  - Basic metabolic panel; Future  - TSH with free T4 reflex; Future  - UA with Microscopic reflex to Culture; Future    2. Paroxysmal a-fib (H)  Maintained in sinus, continue flecainide, continue full aspirin.          Kash Eller MD  [facility]

## 2018-10-02 DIAGNOSIS — I10 ESSENTIAL HYPERTENSION: ICD-10-CM

## 2018-10-02 LAB
ALBUMIN UR-MCNC: NEGATIVE MG/DL
ANION GAP SERPL CALCULATED.3IONS-SCNC: 5 MMOL/L (ref 3–14)
APPEARANCE UR: CLEAR
BILIRUB UR QL STRIP: NEGATIVE
BUN SERPL-MCNC: 8 MG/DL (ref 7–30)
CALCIUM SERPL-MCNC: 8.5 MG/DL (ref 8.5–10.1)
CHLORIDE SERPL-SCNC: 102 MMOL/L (ref 94–109)
CHOLEST SERPL-MCNC: 171 MG/DL
CO2 SERPL-SCNC: 31 MMOL/L (ref 20–32)
COLOR UR AUTO: YELLOW
CREAT SERPL-MCNC: 1.02 MG/DL (ref 0.66–1.25)
GFR SERPL CREATININE-BSD FRML MDRD: 72 ML/MIN/1.7M2
GLUCOSE SERPL-MCNC: 93 MG/DL (ref 70–99)
GLUCOSE UR STRIP-MCNC: NEGATIVE MG/DL
HDLC SERPL-MCNC: 62 MG/DL
HGB UR QL STRIP: NEGATIVE
KETONES UR STRIP-MCNC: NEGATIVE MG/DL
LDLC SERPL CALC-MCNC: 88 MG/DL
LEUKOCYTE ESTERASE UR QL STRIP: NEGATIVE
NITRATE UR QL: NEGATIVE
NONHDLC SERPL-MCNC: 109 MG/DL
PH UR STRIP: 6 PH (ref 5–7)
POTASSIUM SERPL-SCNC: 4.2 MMOL/L (ref 3.4–5.3)
RBC #/AREA URNS AUTO: NORMAL /HPF
SODIUM SERPL-SCNC: 138 MMOL/L (ref 133–144)
SOURCE: NORMAL
SP GR UR STRIP: 1.02 (ref 1–1.03)
TRIGL SERPL-MCNC: 104 MG/DL
TSH SERPL DL<=0.005 MIU/L-ACNC: 1.92 MU/L (ref 0.4–4)
UROBILINOGEN UR STRIP-ACNC: 0.2 EU/DL (ref 0.2–1)
WBC #/AREA URNS AUTO: NORMAL /HPF

## 2018-10-02 PROCEDURE — 80061 LIPID PANEL: CPT | Performed by: INTERNAL MEDICINE

## 2018-10-02 PROCEDURE — 81001 URINALYSIS AUTO W/SCOPE: CPT | Performed by: INTERNAL MEDICINE

## 2018-10-02 PROCEDURE — 36415 COLL VENOUS BLD VENIPUNCTURE: CPT | Performed by: INTERNAL MEDICINE

## 2018-10-02 PROCEDURE — 84443 ASSAY THYROID STIM HORMONE: CPT | Performed by: INTERNAL MEDICINE

## 2018-10-02 PROCEDURE — 80048 BASIC METABOLIC PNL TOTAL CA: CPT | Performed by: INTERNAL MEDICINE

## 2018-10-08 ENCOUNTER — OFFICE VISIT (OUTPATIENT)
Dept: INTERNAL MEDICINE | Facility: CLINIC | Age: 73
End: 2018-10-08
Payer: COMMERCIAL

## 2018-10-08 VITALS
WEIGHT: 116.7 LBS | RESPIRATION RATE: 16 BRPM | TEMPERATURE: 98.3 F | OXYGEN SATURATION: 98 % | HEART RATE: 91 BPM | BODY MASS INDEX: 17.74 KG/M2 | SYSTOLIC BLOOD PRESSURE: 126 MMHG | DIASTOLIC BLOOD PRESSURE: 78 MMHG

## 2018-10-08 DIAGNOSIS — I48.0 PAROXYSMAL A-FIB (H): ICD-10-CM

## 2018-10-08 DIAGNOSIS — I10 ESSENTIAL HYPERTENSION: Primary | ICD-10-CM

## 2018-10-08 PROCEDURE — 99213 OFFICE O/P EST LOW 20 MIN: CPT | Performed by: INTERNAL MEDICINE

## 2018-10-08 RX ORDER — LISINOPRIL 10 MG/1
10 TABLET ORAL DAILY
Qty: 90 TABLET | Refills: 3 | Status: SHIPPED | OUTPATIENT
Start: 2018-10-08 | End: 2019-10-24

## 2018-10-08 NOTE — PATIENT INSTRUCTIONS
- Continue all medications as you have been.      - Please follow-up with me in clinic in 1 year.

## 2018-10-08 NOTE — MR AVS SNAPSHOT
After Visit Summary   10/8/2018    Gavin House    MRN: [id number]           Patient Information     Date Of Birth          1945        Visit Information        Provider Department      10/8/2018 8:45 AM Kash Eller MD [facility]        Today's Diagnoses     Essential hypertension    -  1    Paroxysmal A-fib (H)          Care Instructions    - Continue all medications as you have been.      - Please follow-up with me in clinic in 1 year.           Follow-ups after your visit        Follow-up notes from your care team     Return in about 1 year (around 10/8/2019) for Preventive Visit, Blood Pressure Check.      Who to contact     If you have questions or need follow up information about today's clinic visit or your schedule please contact [facility] directly at [phone].  Normal or non-critical lab and imaging results will be communicated to you by MyChart, letter or phone within 4 business days after the clinic has received the results. If you do not hear from us within 7 days, please contact the clinic through MyChart or phone. If you have a critical or abnormal lab result, we will notify you by phone as soon as possible.  Submit refill requests through MOVL or call your pharmacy and they will forward the refill request to us. Please allow 3 business days for your refill to be completed.          Additional Information About Your Visit        Care EveryWhere ID     This is your Care EveryWhere ID. This could be used by other organizations to access your [facility] medical records  [id number]        Your Vitals Were     Pulse Temperature Respirations Pulse Oximetry BMI (Body Mass Index)       91 98.3  F (36.8  C) (Oral) 16 98% 17.74 kg/m2        Blood Pressure from Last 3 Encounters:   10/08/18 130/80   09/12/18 140/90   08/31/18 160/90    Weight from Last 3 Encounters:   10/08/18 116 lb 11.2 oz (52.9 kg)   09/12/18 116 lb 1.6 oz  (52.7 kg)   08/31/18 116 lb 1.6 oz (52.7 kg)              Today, you had the following     No orders found for display         Where to get your medicines      These medications were sent to [facility] [address] [place] [address] [address] [facility]  [address] [facility] [address]     Phone:  [phone]     lisinopril 10 MG tablet          Primary Care Provider Office Phone # Fax #    Kash Eller [phone] [address] [place] [address]        Equal Access to Services     [facility]: Hadii aad ku hadasho Soomaali, waaxda luqadaha, qaybta kaalmada adeegyada, waxantonella agrawal hayaan adeeg torres dolan . So [facility] [place] [facility] [phone].    ATENCIÓN: Si habla español, tiene a pugh disposición servicios gratuitos de asistencia lingüística. [facility] [phone].    We comply with applicable federal civil rights laws and [place] laws. We do not discriminate on the basis of race, color, national origin, age, disability, sex, sexual orientation, or gender identity.            Thank you!     Thank you for choosing [facility]  for your care. Our goal is always to provide you with excellent care. Hearing back from our patients is one way we can continue to improve our services. Please take a few minutes to complete the written survey that you may receive in the mail after your visit with us. Thank you!             Your Updated Medication List - Protect others around you: Learn how to safely use, store and throw away your medicines at www.disposemymeds.org.          This list is accurate as of 10/8/18  8:55 AM.  Always use your most recent med list.                   Brand Name Dispense Instructions for use Diagnosis    aspirin 325 MG tablet      Take 325 mg by mouth daily        flecainide 100 MG tablet    TAMBOCOR    180 tablet    Take 1 tablet (100 mg) by mouth 2 times daily    Paroxysmal atrial fibrillation (H)       lisinopril 10 MG tablet     PRINIVIL/ZESTRIL    90 tablet    Take 1 tablet (10 mg) by mouth daily    Essential hypertension

## 2018-10-08 NOTE — LETTER
My Asthma Action Plan  Name: Gavin House   YOB: 1945  Date: 10/8/2018   My doctor: Kash Eller MD   My clinic: [facility]        My Control Medicine: None  My Rescue Medicine: Albuterol (Proair/Ventolin/Proventil) inhaler Use for shortness of breath    My Asthma Severity: mild persistent  Avoid your asthma triggers: exercise or sports               GREEN ZONE   Good Control    I feel good    No cough or wheeze    Can work, sleep and play without asthma symptoms       Take your asthma control medicine every day.     1. If exercise triggers your asthma, take your rescue medication    15 minutes before exercise or sports, and    During exercise if you have asthma symptoms  2. Spacer to use with inhaler: If you have a spacer, make sure to use it with your inhaler             YELLOW ZONE Getting Worse  I have ANY of these:    I do not feel good    Cough or wheeze    Chest feels tight    Wake up at night   1. Keep taking your Green Zone medications  2. Start taking your rescue medicine:    every 20 minutes for up to 1 hour. Then every 4 hours for 24-48 hours.  3. If you stay in the Yellow Zone for more than 12-24 hours, contact your doctor.  4. If you do not return to the Green Zone in 12-24 hours or you get worse, start taking your oral steroid medicine if prescribed by your provider.           RED ZONE Medical Alert - Get Help  I have ANY of these:    I feel awful    Medicine is not helping    Breathing getting harder    Trouble walking or talking    Nose opens wide to breathe       1. Take your rescue medicine NOW  2. If your provider has prescribed an oral steroid medicine, start taking it NOW  3. Call your doctor NOW  4. If you are still in the Red Zone after 20 minutes and you have not reached your doctor:    Take your rescue medicine again and    Call 911 or go to the emergency room right away    See your regular doctor within 2 weeks of an Emergency Room or  Urgent Care visit for follow-up treatment.          Annual Reminders:  Meet with Asthma Educator,  Flu Shot in the Fall, consider Pneumonia Vaccination for patients with asthma (aged 19 and older).    Pharmacy:    [facility] [address] [place] [address] [facility]                      Asthma Triggers  How To Control Things That Make Your Asthma Worse    Triggers are things that make your asthma worse.  Look at the list below to help you find your triggers and what you can do about them.  You can help prevent asthma flare-ups by staying away from your triggers.      Trigger                                                          What you can do   Cigarette Smoke  Tobacco smoke can make asthma worse. Do not allow smoking in your home, car or around you.  Be sure no one smokes at a child s day care or school.  If you smoke, ask your health care provider for ways to help you quit.  Ask family members to quit too.  Ask your health care provider for a referral to Quit Plan to help you quit smoking, or call 0-111-729-PLAN.     Colds, Flu, Bronchitis  These are common triggers of asthma. Wash your hands often.  Don t touch your eyes, nose or mouth.  Get a flu shot every year.     Dust Mites  These are tiny bugs that live in cloth or carpet. They are too small to see. Wash sheets and blankets in hot water every week.   Encase pillows and mattress in dust mite proof covers.  Avoid having carpet if you can. If you have carpet, vacuum weekly.   Use a dust mask and HEPA vacuum.   Pollen and Outdoor Mold  Some people are allergic to trees, grass, or weed pollen, or molds. Try to keep your windows closed.  Limit time out doors when pollen count is high.   Ask you health care provider about taking medicine during allergy season.     Animal Dander  Some people are allergic to skin flakes, urine or saliva from pets with fur or feathers. Keep pets with fur or feathers out of your home.    If you  can t keep the pet outdoors, then keep the pet out of your bedroom.  Keep the bedroom door closed.  Keep pets off cloth furniture and away from stuffed toys.     Mice, Rats, and Cockroaches  Some people are allergic to the waste from these pests.   Cover food and garbage.  Clean up spills and food crumbs.  Store grease in the refrigerator.   Keep food out of the bedroom.   Indoor Mold  This can be a trigger if your home has high moisture. Fix leaking faucets, pipes, or other sources of water.   Clean moldy surfaces.  Dehumidify basement if it is damp and smelly.   Smoke, Strong Odors, and Sprays  These can reduce air quality. Stay away from strong odors and sprays, such as perfume, powder, hair spray, paints, smoke incense, paint, cleaning products, candles and new carpet.   Exercise or Sports  Some people with asthma have this trigger. Be active!  Ask your doctor about taking medicine before sports or exercise to prevent symptoms.    Warm up for 5-10 minutes before and after sports or exercise.     Other Triggers of Asthma  Cold air:  Cover your nose and mouth with a scarf.  Sometimes laughing or crying can be a trigger.  Some medicines and food can trigger asthma.

## 2018-10-09 ASSESSMENT — ASTHMA QUESTIONNAIRES: ACT_TOTALSCORE: 24

## 2019-09-09 DIAGNOSIS — I48.0 PAROXYSMAL ATRIAL FIBRILLATION (H): ICD-10-CM

## 2019-09-09 RX ORDER — FLECAINIDE ACETATE 100 MG/1
100 TABLET ORAL 2 TIMES DAILY
Qty: 180 TABLET | Refills: 0 | Status: SHIPPED | OUTPATIENT
Start: 2019-09-09 | End: 2019-11-25

## 2019-10-24 DIAGNOSIS — I10 ESSENTIAL HYPERTENSION: ICD-10-CM

## 2019-10-24 RX ORDER — LISINOPRIL 10 MG/1
TABLET ORAL
Qty: 30 TABLET | Refills: 0 | Status: SHIPPED | OUTPATIENT
Start: 2019-10-24 | End: 2019-11-12

## 2019-10-24 NOTE — LETTER
[facility]  [address] [id number] [phone]            Gavin VALENTINO Kris  [address]  [facility] [address]        October 24, 2019    Dear Gavin,    While refilling your prescription today, we noticed that you are due for an appointment with your provider.  We will refill your prescription for 30 days, but a follow-up appointment must be made before any additional refills can be approved.     Taking care of your health is important to us and we look forward to seeing you in the near future.  Please call us at [phone] or [phone] (or use DailyDigital) to schedule an appointment.     Please disregard this notice if you have already made an appointment.    Sincerely,        [facility]

## 2019-10-24 NOTE — TELEPHONE ENCOUNTER
"Requested Prescriptions   Pending Prescriptions Disp Refills     lisinopril (PRINIVIL/ZESTRIL) 10 MG tablet [Pharmacy Med Name: LISINOPRIL 10MG  TAB] 90 tablet 3     Sig: TAKE 1 TABLET BY MOUTH ONCE DAILY       ACE Inhibitors (Including Combos) Protocol Failed - 10/24/2019 10:13 AM        Failed - Blood pressure under 140/90 in past 12 months     BP Readings from Last 3 Encounters:   10/08/18 126/78   09/12/18 140/90   08/31/18 160/90                 Failed - Recent (12 mo) or future (30 days) visit within the authorizing provider's specialty     Patient has had an office visit with the authorizing provider or a provider within the authorizing providers department within the previous 12 mos or has a future within next 30 days. See \"Patient Info\" tab in inbasket, or \"Choose Columns\" in Meds & Orders section of the refill encounter.              Failed - Normal serum creatinine on file in past 12 months     Recent Labs   Lab Test 10/02/18  0914   CR 1.02             Failed - Normal serum potassium on file in past 12 months     Recent Labs   Lab Test 10/02/18  0914   POTASSIUM 4.2             Passed - Medication is active on med list        Passed - Patient is age 18 or older          Last Written Prescription Date:  10/8/2019  Last Fill Quantity: 90,  # refills: 3   Last office visit: 10/8/2018 with prescribing provider:  Kash Eller     Future Office Visit:      Medication is being filled for 1 time refill only due to:  Patient needs to be seen because it has been more than one year since last visit.     Prescription approved per [facility] Refill Protocol.    Evita HINDS RN, BSN, PHN      "

## 2019-10-27 ENCOUNTER — APPOINTMENT (OUTPATIENT)
Dept: CT IMAGING | Facility: CLINIC | Age: 74
End: 2019-10-27
Attending: EMERGENCY MEDICINE
Payer: COMMERCIAL

## 2019-10-27 ENCOUNTER — APPOINTMENT (OUTPATIENT)
Dept: GENERAL RADIOLOGY | Facility: CLINIC | Age: 74
End: 2019-10-27
Attending: EMERGENCY MEDICINE
Payer: COMMERCIAL

## 2019-10-27 ENCOUNTER — HOSPITAL ENCOUNTER (OUTPATIENT)
Facility: CLINIC | Age: 74
Setting detail: OBSERVATION
Discharge: HOME | End: 2019-10-28
Attending: EMERGENCY MEDICINE | Admitting: INTERNAL MEDICINE
Payer: COMMERCIAL

## 2019-10-27 ENCOUNTER — OFFICE VISIT (OUTPATIENT)
Dept: URGENT CARE | Facility: URGENT CARE | Age: 74
End: 2019-10-27
Payer: COMMERCIAL

## 2019-10-27 VITALS
SYSTOLIC BLOOD PRESSURE: 132 MMHG | RESPIRATION RATE: 20 BRPM | DIASTOLIC BLOOD PRESSURE: 76 MMHG | HEART RATE: 140 BPM | OXYGEN SATURATION: 98 %

## 2019-10-27 DIAGNOSIS — F10.10 ALCOHOL ABUSE: ICD-10-CM

## 2019-10-27 DIAGNOSIS — I48.91 ATRIAL FIBRILLATION WITH RVR (H): Primary | ICD-10-CM

## 2019-10-27 DIAGNOSIS — J93.9 PNEUMOTHORAX ON RIGHT: ICD-10-CM

## 2019-10-27 DIAGNOSIS — R07.9 CHEST PAIN, UNSPECIFIED TYPE: ICD-10-CM

## 2019-10-27 DIAGNOSIS — R07.9 ACUTE CHEST PAIN: Primary | ICD-10-CM

## 2019-10-27 DIAGNOSIS — K27.9 PUD (PEPTIC ULCER DISEASE): ICD-10-CM

## 2019-10-27 DIAGNOSIS — I48.91 ATRIAL FIBRILLATION WITH RAPID VENTRICULAR RESPONSE (H): ICD-10-CM

## 2019-10-27 DIAGNOSIS — R07.9 ACUTE CHEST PAIN: ICD-10-CM

## 2019-10-27 DIAGNOSIS — R79.89 ELEVATED TROPONIN: ICD-10-CM

## 2019-10-27 DIAGNOSIS — E16.2 HYPOGLYCEMIA: ICD-10-CM

## 2019-10-27 PROBLEM — J93.83 ACUTE PNEUMOTHORAX: Status: ACTIVE | Noted: 2019-10-27

## 2019-10-27 PROBLEM — D72.829 LEUKOCYTOSIS: Status: ACTIVE | Noted: 2019-10-27

## 2019-10-27 PROBLEM — I95.9 HYPOTENSION: Status: ACTIVE | Noted: 2019-10-27

## 2019-10-27 LAB
ALBUMIN SERPL-MCNC: 3.3 G/DL (ref 3.4–5)
ALP SERPL-CCNC: 72 U/L (ref 40–150)
ALT SERPL W P-5'-P-CCNC: 16 U/L (ref 0–70)
ANION GAP SERPL CALCULATED.3IONS-SCNC: 19 MMOL/L (ref 3–14)
AST SERPL W P-5'-P-CCNC: 25 U/L (ref 0–45)
BASOPHILS # BLD AUTO: 0 10E9/L (ref 0–0.2)
BASOPHILS NFR BLD AUTO: 0.2 %
BILIRUB SERPL-MCNC: 0.5 MG/DL (ref 0.2–1.3)
BUN SERPL-MCNC: 17 MG/DL (ref 7–30)
CALCIUM SERPL-MCNC: 8.5 MG/DL (ref 8.5–10.1)
CHLORIDE SERPL-SCNC: 99 MMOL/L (ref 94–109)
CO2 SERPL-SCNC: 18 MMOL/L (ref 20–32)
CREAT SERPL-MCNC: 1.03 MG/DL (ref 0.66–1.25)
DIFFERENTIAL METHOD BLD: ABNORMAL
EOSINOPHIL # BLD AUTO: 0.1 10E9/L (ref 0–0.7)
EOSINOPHIL NFR BLD AUTO: 0.7 %
ERYTHROCYTE [DISTWIDTH] IN BLOOD BY AUTOMATED COUNT: 11.9 % (ref 10–15)
ETHANOL SERPL-MCNC: 0.02 G/DL
GFR SERPL CREATININE-BSD FRML MDRD: 72 ML/MIN/{1.73_M2}
GLUCOSE BLDC GLUCOMTR-MCNC: 109 MG/DL (ref 70–99)
GLUCOSE BLDC GLUCOMTR-MCNC: 19 MG/DL (ref 70–99)
GLUCOSE SERPL-MCNC: 271 MG/DL (ref 70–99)
HCT VFR BLD AUTO: 44.2 % (ref 40–53)
HGB BLD-MCNC: 14.9 G/DL (ref 13.3–17.7)
IMM GRANULOCYTES # BLD: 0 10E9/L (ref 0–0.4)
IMM GRANULOCYTES NFR BLD: 0.3 %
INR PPP: 1.08 (ref 0.86–1.14)
INTERPRETATION ECG - MUSE: NORMAL
INTERPRETATION ECG - MUSE: NORMAL
LIPASE SERPL-CCNC: 72 U/L (ref 73–393)
LMWH PPP CHRO-ACNC: 0.23 IU/ML
LYMPHOCYTES # BLD AUTO: 1.5 10E9/L (ref 0.8–5.3)
LYMPHOCYTES NFR BLD AUTO: 12.6 %
MAGNESIUM SERPL-MCNC: 2.1 MG/DL (ref 1.6–2.3)
MCH RBC QN AUTO: 34 PG (ref 26.5–33)
MCHC RBC AUTO-ENTMCNC: 33.7 G/DL (ref 31.5–36.5)
MCV RBC AUTO: 101 FL (ref 78–100)
MONOCYTES # BLD AUTO: 0.5 10E9/L (ref 0–1.3)
MONOCYTES NFR BLD AUTO: 4.5 %
NEUTROPHILS # BLD AUTO: 9.8 10E9/L (ref 1.6–8.3)
NEUTROPHILS NFR BLD AUTO: 81.7 %
NRBC # BLD AUTO: 0 10*3/UL
NRBC BLD AUTO-RTO: 0 /100
NT-PROBNP SERPL-MCNC: 209 PG/ML (ref 0–900)
PLATELET # BLD AUTO: 216 10E9/L (ref 150–450)
POTASSIUM SERPL-SCNC: 3.4 MMOL/L (ref 3.4–5.3)
PROT SERPL-MCNC: 6.6 G/DL (ref 6.8–8.8)
RBC # BLD AUTO: 4.38 10E12/L (ref 4.4–5.9)
SODIUM SERPL-SCNC: 136 MMOL/L (ref 133–144)
TROPONIN I SERPL-MCNC: 0.02 UG/L (ref 0–0.04)
TROPONIN I SERPL-MCNC: 0.1 UG/L (ref 0–0.04)
TROPONIN I SERPL-MCNC: 0.37 UG/L (ref 0–0.04)
WBC # BLD AUTO: 11.8 10E9/L (ref 4–11)

## 2019-10-27 PROCEDURE — 84484 ASSAY OF TROPONIN QUANT: CPT | Performed by: INTERNAL MEDICINE

## 2019-10-27 PROCEDURE — 83735 ASSAY OF MAGNESIUM: CPT | Performed by: EMERGENCY MEDICINE

## 2019-10-27 PROCEDURE — 25000128 H RX IP 250 OP 636: Performed by: EMERGENCY MEDICINE

## 2019-10-27 PROCEDURE — 80320 DRUG SCREEN QUANTALCOHOLS: CPT | Performed by: EMERGENCY MEDICINE

## 2019-10-27 PROCEDURE — 85520 HEPARIN ASSAY: CPT | Performed by: INTERNAL MEDICINE

## 2019-10-27 PROCEDURE — 96366 THER/PROPH/DIAG IV INF ADDON: CPT

## 2019-10-27 PROCEDURE — 96375 TX/PRO/DX INJ NEW DRUG ADDON: CPT

## 2019-10-27 PROCEDURE — 80053 COMPREHEN METABOLIC PANEL: CPT | Performed by: EMERGENCY MEDICINE

## 2019-10-27 PROCEDURE — 85025 COMPLETE CBC W/AUTO DIFF WBC: CPT | Performed by: EMERGENCY MEDICINE

## 2019-10-27 PROCEDURE — 83690 ASSAY OF LIPASE: CPT | Performed by: EMERGENCY MEDICINE

## 2019-10-27 PROCEDURE — 25000132 ZZH RX MED GY IP 250 OP 250 PS 637: Performed by: EMERGENCY MEDICINE

## 2019-10-27 PROCEDURE — 21000001 ZZH R&B HEART CARE

## 2019-10-27 PROCEDURE — 25800025 ZZH RX 258: Performed by: EMERGENCY MEDICINE

## 2019-10-27 PROCEDURE — 93000 ELECTROCARDIOGRAM COMPLETE: CPT | Performed by: PHYSICIAN ASSISTANT

## 2019-10-27 PROCEDURE — 71046 X-RAY EXAM CHEST 2 VIEWS: CPT

## 2019-10-27 PROCEDURE — 96368 THER/DIAG CONCURRENT INF: CPT

## 2019-10-27 PROCEDURE — 83880 ASSAY OF NATRIURETIC PEPTIDE: CPT | Performed by: EMERGENCY MEDICINE

## 2019-10-27 PROCEDURE — 93005 ELECTROCARDIOGRAM TRACING: CPT

## 2019-10-27 PROCEDURE — 93005 ELECTROCARDIOGRAM TRACING: CPT | Mod: 76

## 2019-10-27 PROCEDURE — 00000146 ZZHCL STATISTIC GLUCOSE BY METER IP

## 2019-10-27 PROCEDURE — 36415 COLL VENOUS BLD VENIPUNCTURE: CPT | Performed by: PHYSICIAN ASSISTANT

## 2019-10-27 PROCEDURE — 93010 ELECTROCARDIOGRAM REPORT: CPT | Performed by: INTERNAL MEDICINE

## 2019-10-27 PROCEDURE — 96365 THER/PROPH/DIAG IV INF INIT: CPT | Mod: 59

## 2019-10-27 PROCEDURE — 36415 COLL VENOUS BLD VENIPUNCTURE: CPT | Performed by: INTERNAL MEDICINE

## 2019-10-27 PROCEDURE — 25000125 ZZHC RX 250: Performed by: EMERGENCY MEDICINE

## 2019-10-27 PROCEDURE — 84484 ASSAY OF TROPONIN QUANT: CPT | Performed by: EMERGENCY MEDICINE

## 2019-10-27 PROCEDURE — 71275 CT ANGIOGRAPHY CHEST: CPT

## 2019-10-27 PROCEDURE — 96376 TX/PRO/DX INJ SAME DRUG ADON: CPT

## 2019-10-27 PROCEDURE — 99207 ZZC OFFICE-HOSPITAL ADMIT: CPT | Performed by: PHYSICIAN ASSISTANT

## 2019-10-27 PROCEDURE — 25000132 ZZH RX MED GY IP 250 OP 250 PS 637: Performed by: INTERNAL MEDICINE

## 2019-10-27 PROCEDURE — 99291 CRITICAL CARE FIRST HOUR: CPT | Mod: 25

## 2019-10-27 PROCEDURE — 84484 ASSAY OF TROPONIN QUANT: CPT | Performed by: PHYSICIAN ASSISTANT

## 2019-10-27 PROCEDURE — 85610 PROTHROMBIN TIME: CPT | Performed by: EMERGENCY MEDICINE

## 2019-10-27 PROCEDURE — 99292 CRITICAL CARE ADDL 30 MIN: CPT

## 2019-10-27 PROCEDURE — 25800030 ZZH RX IP 258 OP 636: Performed by: EMERGENCY MEDICINE

## 2019-10-27 PROCEDURE — 99223 1ST HOSP IP/OBS HIGH 75: CPT | Mod: AI | Performed by: INTERNAL MEDICINE

## 2019-10-27 RX ORDER — ONDANSETRON 2 MG/ML
4 INJECTION INTRAMUSCULAR; INTRAVENOUS EVERY 6 HOURS PRN
Status: DISCONTINUED | OUTPATIENT
Start: 2019-10-27 | End: 2019-10-28 | Stop reason: HOSPADM

## 2019-10-27 RX ORDER — POTASSIUM CHLORIDE 1500 MG/1
40 TABLET, EXTENDED RELEASE ORAL ONCE
Status: COMPLETED | OUTPATIENT
Start: 2019-10-27 | End: 2019-10-27

## 2019-10-27 RX ORDER — FAMOTIDINE 20 MG/1
20 TABLET, FILM COATED ORAL 2 TIMES DAILY
Status: DISCONTINUED | OUTPATIENT
Start: 2019-10-27 | End: 2019-10-28 | Stop reason: HOSPADM

## 2019-10-27 RX ORDER — ACETAMINOPHEN 325 MG/1
650 TABLET ORAL EVERY 4 HOURS PRN
Status: DISCONTINUED | OUTPATIENT
Start: 2019-10-27 | End: 2019-10-28 | Stop reason: HOSPADM

## 2019-10-27 RX ORDER — DILTIAZEM HYDROCHLORIDE 5 MG/ML
10 INJECTION INTRAVENOUS ONCE
Status: COMPLETED | OUTPATIENT
Start: 2019-10-27 | End: 2019-10-27

## 2019-10-27 RX ORDER — ONDANSETRON 2 MG/ML
4 INJECTION INTRAMUSCULAR; INTRAVENOUS EVERY 30 MIN PRN
Status: DISCONTINUED | OUTPATIENT
Start: 2019-10-27 | End: 2019-10-27

## 2019-10-27 RX ORDER — DILTIAZEM HYDROCHLORIDE 5 MG/ML
20 INJECTION INTRAVENOUS ONCE
Status: DISCONTINUED | OUTPATIENT
Start: 2019-10-27 | End: 2019-10-27

## 2019-10-27 RX ORDER — IOPAMIDOL 755 MG/ML
60 INJECTION, SOLUTION INTRAVASCULAR ONCE
Status: COMPLETED | OUTPATIENT
Start: 2019-10-27 | End: 2019-10-27

## 2019-10-27 RX ORDER — NITROGLYCERIN 0.4 MG/1
0.4 TABLET SUBLINGUAL EVERY 5 MIN PRN
Status: DISCONTINUED | OUTPATIENT
Start: 2019-10-27 | End: 2019-10-27

## 2019-10-27 RX ORDER — FOLIC ACID 1 MG/1
1 TABLET ORAL ONCE
Status: COMPLETED | OUTPATIENT
Start: 2019-10-27 | End: 2019-10-27

## 2019-10-27 RX ORDER — DILTIAZEM HYDROCHLORIDE 5 MG/ML
25 INJECTION INTRAVENOUS ONCE
Status: COMPLETED | OUTPATIENT
Start: 2019-10-27 | End: 2019-10-27

## 2019-10-27 RX ORDER — LANOLIN ALCOHOL/MO/W.PET/CERES
100 CREAM (GRAM) TOPICAL ONCE
Status: DISCONTINUED | OUTPATIENT
Start: 2019-10-28 | End: 2019-10-28 | Stop reason: HOSPADM

## 2019-10-27 RX ORDER — DEXTROSE MONOHYDRATE 25 G/50ML
50 INJECTION, SOLUTION INTRAVENOUS ONCE
Status: COMPLETED | OUTPATIENT
Start: 2019-10-27 | End: 2019-10-27

## 2019-10-27 RX ORDER — ALUMINA, MAGNESIA, AND SIMETHICONE 2400; 2400; 240 MG/30ML; MG/30ML; MG/30ML
30 SUSPENSION ORAL EVERY 4 HOURS PRN
Status: DISCONTINUED | OUTPATIENT
Start: 2019-10-27 | End: 2019-10-28 | Stop reason: HOSPADM

## 2019-10-27 RX ORDER — HEPARIN SODIUM 10000 [USP'U]/100ML
600 INJECTION, SOLUTION INTRAVENOUS CONTINUOUS
Status: DISCONTINUED | OUTPATIENT
Start: 2019-10-27 | End: 2019-10-28

## 2019-10-27 RX ORDER — ASPIRIN 325 MG
325 TABLET ORAL ONCE
Status: DISCONTINUED | OUTPATIENT
Start: 2019-10-27 | End: 2019-10-28

## 2019-10-27 RX ORDER — MULTIVITAMIN,THERAPEUTIC
1 TABLET ORAL ONCE
Status: COMPLETED | OUTPATIENT
Start: 2019-10-27 | End: 2019-10-27

## 2019-10-27 RX ORDER — NALOXONE HYDROCHLORIDE 0.4 MG/ML
.1-.4 INJECTION, SOLUTION INTRAMUSCULAR; INTRAVENOUS; SUBCUTANEOUS
Status: DISCONTINUED | OUTPATIENT
Start: 2019-10-27 | End: 2019-10-28 | Stop reason: HOSPADM

## 2019-10-27 RX ORDER — NITROGLYCERIN 0.4 MG/1
0.4 TABLET SUBLINGUAL EVERY 5 MIN PRN
Status: DISCONTINUED | OUTPATIENT
Start: 2019-10-27 | End: 2019-10-28

## 2019-10-27 RX ORDER — HYDROCODONE BITARTRATE AND ACETAMINOPHEN 5; 325 MG/1; MG/1
1-2 TABLET ORAL EVERY 4 HOURS PRN
Status: DISCONTINUED | OUTPATIENT
Start: 2019-10-27 | End: 2019-10-28 | Stop reason: HOSPADM

## 2019-10-27 RX ORDER — LANOLIN ALCOHOL/MO/W.PET/CERES
100 CREAM (GRAM) TOPICAL ONCE
Status: COMPLETED | OUTPATIENT
Start: 2019-10-27 | End: 2019-10-27

## 2019-10-27 RX ORDER — HEPARIN SODIUM 10000 [USP'U]/100ML
600 INJECTION, SOLUTION INTRAVENOUS CONTINUOUS
Status: DISCONTINUED | OUTPATIENT
Start: 2019-10-27 | End: 2019-10-27

## 2019-10-27 RX ORDER — LIDOCAINE 40 MG/G
CREAM TOPICAL
Status: DISCONTINUED | OUTPATIENT
Start: 2019-10-27 | End: 2019-10-28 | Stop reason: HOSPADM

## 2019-10-27 RX ORDER — LISINOPRIL 10 MG/1
10 TABLET ORAL EVERY EVENING
Status: DISCONTINUED | OUTPATIENT
Start: 2019-10-27 | End: 2019-10-28 | Stop reason: HOSPADM

## 2019-10-27 RX ORDER — ONDANSETRON 4 MG/1
4 TABLET, ORALLY DISINTEGRATING ORAL EVERY 6 HOURS PRN
Status: DISCONTINUED | OUTPATIENT
Start: 2019-10-27 | End: 2019-10-28 | Stop reason: HOSPADM

## 2019-10-27 RX ORDER — LORAZEPAM 2 MG/ML
.5-1 INJECTION INTRAMUSCULAR EVERY 4 HOURS PRN
Status: DISCONTINUED | OUTPATIENT
Start: 2019-10-27 | End: 2019-10-28 | Stop reason: HOSPADM

## 2019-10-27 RX ORDER — LORAZEPAM 2 MG/ML
0.5 INJECTION INTRAMUSCULAR ONCE
Status: COMPLETED | OUTPATIENT
Start: 2019-10-27 | End: 2019-10-27

## 2019-10-27 RX ORDER — NITROGLYCERIN 0.4 MG/1
0.4 TABLET SUBLINGUAL EVERY 5 MIN PRN
Status: DISCONTINUED | OUTPATIENT
Start: 2019-10-27 | End: 2019-10-28 | Stop reason: HOSPADM

## 2019-10-27 RX ORDER — MAGNESIUM OXIDE 400 MG/1
800 TABLET ORAL ONCE
Status: COMPLETED | OUTPATIENT
Start: 2019-10-27 | End: 2019-10-27

## 2019-10-27 RX ORDER — FLECAINIDE ACETATE 50 MG/1
100 TABLET ORAL 2 TIMES DAILY
Status: DISCONTINUED | OUTPATIENT
Start: 2019-10-27 | End: 2019-10-28 | Stop reason: HOSPADM

## 2019-10-27 RX ADMIN — FLECAINIDE ACETATE 100 MG: 50 TABLET ORAL at 22:32

## 2019-10-27 RX ADMIN — IOPAMIDOL 60 ML: 755 INJECTION, SOLUTION INTRAVENOUS at 12:44

## 2019-10-27 RX ADMIN — LISINOPRIL 10 MG: 10 TABLET ORAL at 22:32

## 2019-10-27 RX ADMIN — SODIUM CHLORIDE 85 ML: 9 INJECTION, SOLUTION INTRAVENOUS at 12:44

## 2019-10-27 RX ADMIN — DEXTROSE AND SODIUM CHLORIDE: 5; 450 INJECTION, SOLUTION INTRAVENOUS at 11:06

## 2019-10-27 RX ADMIN — ALUMINUM HYDROXIDE, MAGNESIUM HYDROXIDE, AND DIMETHICONE 30 ML: 400; 400; 40 SUSPENSION ORAL at 19:45

## 2019-10-27 RX ADMIN — DEXTROSE MONOHYDRATE 50 ML: 500 INJECTION PARENTERAL at 11:00

## 2019-10-27 RX ADMIN — DILTIAZEM HYDROCHLORIDE 25 MG: 5 INJECTION INTRAVENOUS at 11:04

## 2019-10-27 RX ADMIN — MAGNESIUM OXIDE TAB 400 MG (241.3 MG ELEMENTAL MG) 800 MG: 400 (241.3 MG) TAB at 12:08

## 2019-10-27 RX ADMIN — DILTIAZEM HYDROCHLORIDE 10 MG: 5 INJECTION INTRAVENOUS at 11:30

## 2019-10-27 RX ADMIN — DILTIAZEM HYDROCHLORIDE 5 MG/HR: 5 INJECTION INTRAVENOUS at 11:30

## 2019-10-27 RX ADMIN — THERA TABS 1 TABLET: TAB at 12:08

## 2019-10-27 RX ADMIN — ONDANSETRON 4 MG: 2 INJECTION INTRAMUSCULAR; INTRAVENOUS at 12:08

## 2019-10-27 RX ADMIN — NITROGLYCERIN 0.4 MG: 0.4 TABLET SUBLINGUAL at 12:09

## 2019-10-27 RX ADMIN — POTASSIUM CHLORIDE 40 MEQ: 1500 TABLET, EXTENDED RELEASE ORAL at 16:09

## 2019-10-27 RX ADMIN — LORAZEPAM 0.5 MG: 2 INJECTION, SOLUTION INTRAMUSCULAR; INTRAVENOUS at 11:12

## 2019-10-27 RX ADMIN — FOLIC ACID 1 MG: 1 TABLET ORAL at 12:08

## 2019-10-27 RX ADMIN — Medication 3100 UNITS: at 13:17

## 2019-10-27 RX ADMIN — Medication 100 MG: at 12:08

## 2019-10-27 RX ADMIN — NITROGLYCERIN 0.4 MG: 0.4 TABLET SUBLINGUAL at 10:06

## 2019-10-27 RX ADMIN — HEPARIN SODIUM 600 UNITS/HR: 10000 INJECTION, SOLUTION INTRAVENOUS at 13:18

## 2019-10-27 RX ADMIN — FAMOTIDINE 20 MG: 20 TABLET, FILM COATED ORAL at 22:32

## 2019-10-27 ASSESSMENT — ENCOUNTER SYMPTOMS
DIARRHEA: 0
DIZZINESS: 1
SHORTNESS OF BREATH: 1
VOMITING: 0
FEVER: 0
DIAPHORESIS: 1

## 2019-10-27 ASSESSMENT — ACTIVITIES OF DAILY LIVING (ADL)
RETIRED_COMMUNICATION: 3-->UNABLE TO SPEAK (NOT RELATED TO LANGUAGE BARRIER)
BATHING: 0-->INDEPENDENT
DRESS: 0-->INDEPENDENT
COGNITION: 0 - NO COGNITION ISSUES REPORTED
RETIRED_EATING: 0-->INDEPENDENT
TOILETING: 0-->INDEPENDENT
SWALLOWING: 0-->SWALLOWS FOODS/LIQUIDS WITHOUT DIFFICULTY

## 2019-10-27 ASSESSMENT — MIFFLIN-ST. JEOR
SCORE: 1233.44
SCORE: 1223.46
SCORE: 1244.5
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
## 2019-10-27 NOTE — PROGRESS NOTES
Clinic Administered Medication Documentation    MEDICATION LIST: Oral Medication Documentation    Patient was given Nitroglycerin Sublingual 0.4mg tablet. Prior to medication administration, verified patients identity using patient s name and date of birth. Please see MAR and medication order for additional information.     Was entire amount of medication used? Yes

## 2019-10-27 NOTE — ED PROVIDER NOTES
History     Chief Complaint:  Chest Pain    The history is provided by the patient and the EMS personnel.      Gavin House is a 73 year old male who presents via EMS with chest pain. The patient woke up at 0900 this morning full of sweat, dizzy, with a headache, short of breath, and chest pain that he rates at 6/10 and he describes as a tightness. The patient went to urgent care and they took some labs, obtained an EKG and called EMS. The patient has a history of paroxysmal atrial fibrillation and EMS reports he has been going from a-fib to a-flutter with heart rates in the 120-180s. EMS checked a blood sugar and it was 29. The patient had a tube of glucose orally just prior to arrival and a recheck was not done by EMS. The patient denies being diabetic. He denies any vomiting, diarrhea, and recent illness. The patient does take an aspirin every night. The patient was given a nitro and aspirin at urgent care but the patient is not sure if it helped. The patient's wife states that he drinks too much vodka with his last drink being last night and he does not eat much. He has never gone through withdrawal according to the patient's wife.     Labs from the clinic this morning:  Troponin(10:02 AM): 0.021    Allergies:  Penicillins     Medications:    Aspirin  Flecainide  Lisinopril     Past Medical History:    Allergic rhinitis  Diverticulosis  Asthma  Paroxysmal atrial fibrillation  Premature ventricular contraction  Rosacea  Scoliosis  Hypertension    Past Surgical History:    Right inguinal herniorrhaphy  Left inguinal herniorrhaphy    Family History:    Cancer  Heart disease  C.A.D.    Social History:  Patient is   Tobacco Use: Former smoker  Alcohol Use: Yes  PCP: Kash Eller     Review of Systems   Constitutional: Positive for diaphoresis. Negative for fever.   Respiratory: Positive for shortness of breath.    Cardiovascular: Positive for chest pain.   Gastrointestinal: Negative for diarrhea and  "vomiting.   Neurological: Positive for dizziness.   All other systems reviewed and are negative.    Physical Exam   First Vitals:  Patient Vitals for the past 24 hrs:   BP Temp Temp src Pulse Heart Rate Resp SpO2 Height Weight   10/27/19 1252 111/70 -- -- -- -- -- 100 % -- --   10/27/19 1250 -- -- -- -- 92 18 -- -- --   10/27/19 1247 109/57 -- -- -- -- -- -- -- --   10/27/19 1228 -- -- -- -- 120 20 97 % -- --   10/27/19 1225 (!) 88/70 -- -- 124 105 -- -- -- --   10/27/19 1220 (!) 63/57 -- -- 135 103 -- -- -- --   10/27/19 1215 (!) 42/29 -- -- 121 122 -- -- -- --   10/27/19 1210 102/61 -- -- 125 -- -- 98 % -- --   10/27/19 1150 (!) 114/94 -- -- 120 122 19 99 % -- --   10/27/19 1130 124/76 -- -- 109 118 13 100 % -- --   10/27/19 1122 -- -- -- -- 123 20 100 % -- --   10/27/19 1121 (!) 126/94 -- -- 128 -- -- -- -- --   10/27/19 1059 -- -- -- -- 138 12 100 % -- --   10/27/19 1056 (!) 133/107 97.3  F (36.3  C) Tympanic 129 -- 18 98 % 1.727 m (5' 8\") 52 kg (114 lb 10.2 oz)     Physical Exam  Nursing note and vitals reviewed.  Constitutional:  Oriented to person, place, and time. Cooperative.   HENT:   Nose:    Nose normal.   Mouth/Throat:   Mucous membranes are normal.   Eyes:    Conjunctivae normal and EOM are normal.      Pupils are equal, round, and reactive to light.   Neck:    Trachea normal.   Cardiovascular:  Tachycardic rate, irregular irregular rhythm, normal heart sounds and normal pulses. No murmur heard.  Pulmonary/Chest:  Effort normal and breath sounds normal.   Abdominal:   Soft. Normal appearance and bowel sounds are normal.      There is no tenderness.      There is no rebound and no CVA tenderness.   Musculoskeletal:  Extremities atraumatic x 4.   Lymphadenopathy:  No cervical adenopathy.   Neurological:   Alert and oriented to person, place, and time. Normal strength.      No cranial nerve deficit or sensory deficit. GCS eye subscore is 4. GCS verbal subscore is 5. GCS motor subscore is 6.   Skin: "    Skin is intact. No rash noted.   Psychiatric:   Slightly anxious, but otherwise normal mood and affect.    Emergency Department Course   ECG:  @ 1055  Indication: Chest pain  Vent. Rate 147 bpm. MN interval * ms. QRS duration 84 ms. QT/QTc 332/519 ms. P-R-T axis * 88 55.   Atrial fibrillation with rapid ventricular response. Voltage criteria for left ventricular hypertrophy. Nonspecific ST and T wave abnormality. Abnormal ECG.    Read @ 1055 by Dr. Marquez.    @ 1113  Indication: Recheck  Vent. Rate 81 bpm. MN interval * ms. QRS duration 90 ms. QT/QTc 406/471 ms. P-R-T axis * 85 79.   Atrial flutter with variable AV block with premature ventricular or aberrantly conducted complexes. Minimal voltage criteria for LVH, may be normal variant. Abnormal ECG.     Read @ 1113 by Dr. Marquez.    Imaging:  Radiographic findings were communicated with the patient who voiced understanding of the findings.  XR chest 2 views:  Tiny lateral upper right pneumothorax. There are no acute  infiltrates. The cardiac silhouette is not enlarged. Pulmonary  vasculature is unremarkable. Stable thoracolumbar scoliosis. Per radiology read.  CT chest pulmonary embolism w contrast:  1. No pulmonary embolism demonstrated.  2. No thoracic aortic dissection or aneurysm.   3. Small right pneumothorax. Per radiology read.     Laboratory:  CBC:  WBC 11.8 high, HGB 14.9,   CMP: Carbon dioxide 18 low, Anion Gap 19 high, Glucose 271 high, Albumin 3.3 low, Protein Total 6.6 low, o/w WNL. (Creatinine 1.03)  INR: 1.08  Troponin: 0.104 high  BNP: 209  Magnesium: 2.1  Alcohol Ethyl: 0.02 high  Lipase: 72 low  Glucose (1058): 19 (LL)  Glucose (1120): 109 high    Interventions:  1100: Dextrose 50% 50mL IV  1104: Cardizem 25mg IV   1106: Dextrose 5% Infusion IV  1112: Ativan 0.5mg IV  1130:  Cardizem 10mg IV  1130: Cardizem Infusion IV  1208: Folvite 1mg IV  1208: Thiamine 100mg PO  1208: Mag-ox 800mg PO  1208: Thera-vit 1 tablet PO  1208:  Zofran 4mg IV  1209: Nitrostat 0.4mg Sublingual  1220: GI cocktail 30mL PO  1244: Isovue 60mL IV  1317: Heparin loading dose 3,100 units IV  1318: Heparin infusion 25,000 units IV    Emergency Department Course:  10:47 AM Nursing notes and vitals reviewed.  I performed an exam of the patient as documented above.     10:58 AM Blood sugar was rechecked.    11:00 AM D5 was ordered.    11:06 AM The patient's heart rate slowed down to the 70s after the Cardizem but the patient denies feeling any better.     11:09 AM The patients heart rate increased into the 120s again, Cardizem drip was ordered.    12:14 PM I rechecked on and updated the patient.    Findings and plan explained to the patient who consents to admission.   12:23 PM I discussed the patient with Dr. Huitron of the hospitalist service, who will admit the patient to a medical bed for further monitoring, evaluation, and treatment.    Impression & Plan      Medical Decision Making:  This is a 73-year-old male who came in the EMS from urgent care due to chest pain and rapid atrial fibrillation/flutter.  His EKG does not show any significant ischemic changes though, which is reassuring.  We proceeded with the above blood work and chest x-ray, and he was provided IV diltiazem followed by a diltiazem drip.  He was also provided some Ativan to help with his anxiety and some Zofran for nausea.  Unfortunately, he was persistently hypoglycemic here as well, which I suspect is secondary to malnutrition and his alcohol abuse.  He was provided an amp of D50 through the IV followed by D5 half-normal saline at 100 cc/h.  We tried providing more sublingual nitroglycerin to see if that might help his pain, and it did not appear to and unfortunately it dropped his blood pressure significantly.  His blood pressure responded appropriately though with IV fluids and time.  I felt it was reasonable to also proceed with a CT scan of his chest to rule out a pulmonary embolism and  aortic dissection.  His CT scan shows that he actually has a small pneumothorax on the right, which certainly could be contributing to his symptoms.  It turns out that a few days ago, he rakes leaves in a very strenuous manner, and I suspect that may have been when he developed a pneumothorax.  Regardless, it is not big enough to warrant any type of intervention at this time.  Clearly, he needs to be admitted to the hospital for further evaluation and we are starting him on IV heparin given his elevated troponin, although that certainly could be secondary to demand related ischemia.  I then spoke with Dr. Huitron, who will be admitting the patient.    Diagnosis:    ICD-10-CM    1. Atrial fibrillation/flutter with rapid ventricular response (H) I48.91    2. Chest pain, unspecified type R07.9    3. Alcohol abuse F10.10    4. Elevated troponin R79.89    5. Small pneumothorax on right J93.9    6. Hypoglycemia E16.2        Disposition:  Admitted to the hospitalist    I, Bradley Aasen, am serving as a scribe on 10/27/2019 at 10:49 AM to personally document services performed by Oscar Marquez MD based on my observations and the provider's statements to me.        Oscar Marquez MD  10/27/19 141       Oscar Marquez MD  10/27/19 1416

## 2019-10-27 NOTE — ED TRIAGE NOTES
Pt woke up this morning diaphoretic and with CP- went to urgent care, had lab work done and EKG, was found in afib/aflutter with rate of 110-180. Pt sent to ED by EMS, had a blood sugar of 29- was given 1 tube of glucose. Pt A & O x 4, denies being on blood thinners, pt reports being nauseas, having chest pain and pressure

## 2019-10-27 NOTE — PATIENT INSTRUCTIONS
(R07.9) Acute chest pain  (primary encounter diagnosis)  Comment: patient given 325mg aspirin to chew, 6 liters of O2 by nasal canula  And sublingual nitroglycerin 0.4mg. which did not offer much relief until he was sitting up to [place] [facility] [place] [facility] [place]  Plan: EKG 12-lead complete w/read - Clinics, aspirin         (ASA) tablet 325 mg, nitroGLYcerin (NITROSTAT)         sublingual tablet 0.4 mg, Troponin I          TO ED now by EMS

## 2019-10-27 NOTE — ED NOTES
Bed: ST01  Expected date:   Expected time:   Means of arrival:   Comments:  Mary Beth 512 Chest pain 73 m

## 2019-10-27 NOTE — PHARMACY-ADMISSION MEDICATION HISTORY
"Admission medication history interview status for the 10/27/2019  admission is complete. See EPIC admission navigator for prior to admission medications     Medication history source reliability:Good    Actions taken by pharmacist (provider contacted, etc): Interviewed patient and wife, used [employer]     Additional medication history information not noted on PTA med list : Patient cannot remember name of rosacea topical ointment. Uses an inhaler called \"primatene mist.\"    Medication reconciliation/reorder completed by provider prior to medication history? No    Time spent in this activity: 20 minutes    Prior to Admission medications    Medication Sig Last Dose Taking? Auth Provider   aspirin 325 MG tablet Take 325 mg by mouth daily 10/26/2019 at pm Yes Reported, Patient   EPINEPHrine (PRIMATENE MIST) 0.125 MG/ACT AERO Inhale 1 puff into the lungs daily as needed 10/25/2019 Yes Unknown, Entered By History   flecainide (TAMBOCOR) 100 MG tablet Take 1 tablet (100 mg) by mouth 2 times daily 10/27/2019 at am Yes Ramona Walters APRN CNP   lisinopril (PRINIVIL/ZESTRIL) 10 MG tablet TAKE 1 TABLET BY MOUTH ONCE DAILY 10/26/2019 at pm Yes Kash Eller MD   UNKNOWN TO PATIENT Topical used for rosacea - apply to face daily at bedtime. 10/24/2019 Yes Unknown, Entered By History       "

## 2019-10-27 NOTE — PROGRESS NOTES
"No chief complaint on file.    SUBJECTIVE:   Gavin House is a 73 year old male who presents to the office with the CC of chest pain.  Onset at 9am when he awoke this morning.  He aoke with the pain, sweats and left arm discomfort with nausea.  Onset was about 45 minutes prior to arrival in clinic    Patient complains of left sided chest pain with radiation into his left arm, along with left arm tingling.   Describes the pain as a pressure, as though something is on his chest.    Shortness of breath.   Nausea.    Complains that his heart is beating \"very fast\"    Pain is relieved by none.  Cardiac risk factors: hypertension.  H/O smoking in past.       SH: he is here today with his wife    Past Medical History:   Diagnosis Date     Allergic rhinitis, cause unspecified      Diverticulosis of colon     Diverticulitis 2010     Mild intermittent asthma      Paroxysmal atrial fibrillation (H) 2016     PVC (premature ventricular contraction)     intermittent bigeminy     Rosacea      Scoliosis (and kyphoscoliosis), idiopathic          Current Outpatient Medications:      aspirin 325 MG tablet, Take 325 mg by mouth daily, Disp: , Rfl:      flecainide (TAMBOCOR) 100 MG tablet, Take 1 tablet (100 mg) by mouth 2 times daily, Disp: 180 tablet, Rfl: 0     lisinopril (PRINIVIL/ZESTRIL) 10 MG tablet, TAKE 1 TABLET BY MOUTH ONCE DAILY, Disp: 30 tablet, Rfl: 0    Social History     Tobacco Use     Smoking status: Former Smoker     Packs/day: 0.10     Years: 23.00     Pack years: 2.30     Types: Cigarettes     Start date:      Last attempt to quit:      Years since quittin.8     Smokeless tobacco: Never Used     Tobacco comment: , smoked 1-2  cig day 23 years   Substance Use Topics     Alcohol use: Yes     Alcohol/week: 0.0 standard drinks     Comment: 2-4 drinks day, beer/bloody robert       ROS:CONSTITUTIONAL:NEGATIVE for fever, chills, change in weight  INTEGUMENTARY/SKIN: NEGATIVE for worrisome rashes, " moles or lesions  EYES: NEGATIVE for vision changes or irritation  ENT/MOUTH: NEGATIVE for ear, mouth and throat problems  RESP:as per HPI  CARDIAC: as per HPI  GI: as per HPI  : negative for dysuria, hematuria, decreased urinary stream, erectile dysfunction  MUSCULOSKELETAL: NEGATIVE for significant arthralgias or myalgia  NEURO: NEGATIVE for weakness, dizziness or paresthesias  Review of systems negative except as stated above.    EXAM:  There were no vitals taken for this visit.  GENERAL APPEARANCE: healthy, alert and no distress  EYES: EOMI,  PERRL, conjunctiva clear  NECK: supple, nontender, no lymphadenopathy  RESP: lungs clear to auscultation - no rales, rhonchi or wheezes  CV: tachycardia  NEURO: Normal strength and tone, sensory exam grossly normal,  normal speech and mentation  SKIN: no suspicious lesions or rashes  PSYCH: mentation appears normal     Office EKG demonstrates:  Atrial flutter with diffuse ST segment depression    Troponin pending at discharge to EMS      (R07.9) Acute chest pain  (primary encounter diagnosis)  Comment: patient given 325mg aspirin to chew, 6 liters of O2 by nasal canula  And sublingual nitroglycerin 0.4mg. which did not offer much relief until he was sitting up to be moved to [facility] by [facility] [place]  Plan: EKG 12-lead complete w/read - Clinics, aspirin         (ASA) tablet 325 mg, nitroGLYcerin (NITROSTAT)         sublingual tablet 0.4 mg, Troponin I          TO ED now by EMS

## 2019-10-27 NOTE — PROGRESS NOTES
DATE:  10/27/2019   TIME OF RECEIPT FROM LAB:  2258  LAB TEST:  troponin  LAB VALUE:  0.369  RESULTS GIVEN WITH READ-BACK TO (PROVIDER):  Bedside RN Reid notified, page sent to notify Dr. Sewell     Pt is CP free, VSS.

## 2019-10-28 ENCOUNTER — APPOINTMENT (OUTPATIENT)
Dept: GENERAL RADIOLOGY | Facility: CLINIC | Age: 74
End: 2019-10-28
Attending: INTERNAL MEDICINE
Payer: COMMERCIAL

## 2019-10-28 VITALS
OXYGEN SATURATION: 97 % | SYSTOLIC BLOOD PRESSURE: 113 MMHG | DIASTOLIC BLOOD PRESSURE: 66 MMHG | RESPIRATION RATE: 18 BRPM | HEIGHT: 68 IN | BODY MASS INDEX: 16.61 KG/M2 | TEMPERATURE: 98.7 F | WEIGHT: 109.6 LBS | HEART RATE: 70 BPM

## 2019-10-28 LAB
ANION GAP SERPL CALCULATED.3IONS-SCNC: 5 MMOL/L (ref 3–14)
BUN SERPL-MCNC: 14 MG/DL (ref 7–30)
CALCIUM SERPL-MCNC: 8 MG/DL (ref 8.5–10.1)
CHLORIDE SERPL-SCNC: 107 MMOL/L (ref 94–109)
CO2 SERPL-SCNC: 26 MMOL/L (ref 20–32)
CREAT SERPL-MCNC: 0.93 MG/DL (ref 0.66–1.25)
ERYTHROCYTE [DISTWIDTH] IN BLOOD BY AUTOMATED COUNT: 11.9 % (ref 10–15)
GFR SERPL CREATININE-BSD FRML MDRD: 81 ML/MIN/{1.73_M2}
GLUCOSE SERPL-MCNC: 73 MG/DL (ref 70–99)
HCT VFR BLD AUTO: 39.8 % (ref 40–53)
HGB BLD-MCNC: 13.5 G/DL (ref 13.3–17.7)
LMWH PPP CHRO-ACNC: 0.12 IU/ML
MCH RBC QN AUTO: 33.8 PG (ref 26.5–33)
MCHC RBC AUTO-ENTMCNC: 33.9 G/DL (ref 31.5–36.5)
MCV RBC AUTO: 100 FL (ref 78–100)
PLATELET # BLD AUTO: 185 10E9/L (ref 150–450)
POTASSIUM SERPL-SCNC: 4.8 MMOL/L (ref 3.4–5.3)
RBC # BLD AUTO: 4 10E12/L (ref 4.4–5.9)
SODIUM SERPL-SCNC: 138 MMOL/L (ref 133–144)
TROPONIN I SERPL-MCNC: 0.07 UG/L (ref 0–0.04)
WBC # BLD AUTO: 7.9 10E9/L (ref 4–11)

## 2019-10-28 PROCEDURE — 85520 HEPARIN ASSAY: CPT | Performed by: INTERNAL MEDICINE

## 2019-10-28 PROCEDURE — 99217 ZZC OBSERVATION CARE DISCHARGE: CPT | Performed by: INTERNAL MEDICINE

## 2019-10-28 PROCEDURE — G0378 HOSPITAL OBSERVATION PER HR: HCPCS

## 2019-10-28 PROCEDURE — 36415 COLL VENOUS BLD VENIPUNCTURE: CPT | Performed by: INTERNAL MEDICINE

## 2019-10-28 PROCEDURE — 84484 ASSAY OF TROPONIN QUANT: CPT | Performed by: INTERNAL MEDICINE

## 2019-10-28 PROCEDURE — 80048 BASIC METABOLIC PNL TOTAL CA: CPT | Performed by: INTERNAL MEDICINE

## 2019-10-28 PROCEDURE — 71045 X-RAY EXAM CHEST 1 VIEW: CPT

## 2019-10-28 PROCEDURE — 85027 COMPLETE CBC AUTOMATED: CPT | Performed by: INTERNAL MEDICINE

## 2019-10-28 PROCEDURE — 25000132 ZZH RX MED GY IP 250 OP 250 PS 637: Performed by: INTERNAL MEDICINE

## 2019-10-28 RX ORDER — ALUMINA, MAGNESIA, AND SIMETHICONE 2400; 2400; 240 MG/30ML; MG/30ML; MG/30ML
30 SUSPENSION ORAL EVERY 4 HOURS PRN
Start: 2019-10-28 | End: 2021-01-06

## 2019-10-28 RX ORDER — ASPIRIN 325 MG
325 TABLET, DELAYED RELEASE (ENTERIC COATED) ORAL DAILY
Qty: 100 TABLET | Refills: 3 | Status: SHIPPED | OUTPATIENT
Start: 2019-10-28 | End: 2023-01-01

## 2019-10-28 RX ORDER — FAMOTIDINE 20 MG/1
20 TABLET, FILM COATED ORAL 2 TIMES DAILY
Qty: 60 TABLET | Refills: 3 | Status: SHIPPED | OUTPATIENT
Start: 2019-10-28 | End: 2019-11-01

## 2019-10-28 RX ORDER — FAMOTIDINE 20 MG/1
20 TABLET, FILM COATED ORAL 2 TIMES DAILY
Qty: 60 TABLET | Refills: 3 | Status: SHIPPED | OUTPATIENT
Start: 2019-10-28 | End: 2019-10-28

## 2019-10-28 RX ADMIN — ASPIRIN 325 MG: 325 TABLET, DELAYED RELEASE ORAL at 09:19

## 2019-10-28 RX ADMIN — FAMOTIDINE 20 MG: 20 TABLET, FILM COATED ORAL at 09:19

## 2019-10-28 RX ADMIN — FLECAINIDE ACETATE 100 MG: 50 TABLET ORAL at 09:19

## 2019-10-28 ASSESSMENT — MIFFLIN-ST. JEOR: SCORE: 1221.64

## 2019-10-28 NOTE — PLAN OF CARE
Patient is alert and oriented times 4.  cms is intact. No co pain. Patient is up indep.  pt voiding per BR. NSR with PVC's. No SOB. Lungs diminished. Educated pt on Pepcid, Maalox, ASA, Afib, Follow up appts. Discharging with wife at 1030.

## 2019-10-28 NOTE — PROGRESS NOTES
Care Coordination:      Nov 01, 2019  3:15 PM CDT  Office Visit with Kash Eller MD  [facility] [address] [address] [phone]   Bring a current list of meds and any records pertaining to this visit.  For Physicals, please bring immunization records and any forms needing to be filled out. Please arrive 10 minutes early to complete paperwork.         An appointment was made for this patient.  Writer also gave Observation/Outpatient brochure.      Alejandra Albarado RN  BSN, Care Coordinator    [facility]/ Care Transitions          [email or web address]                Phone [phone]

## 2019-10-28 NOTE — PROVIDER NOTIFICATION
MD Notification    Notified Person: MD Cardiologist     Notified Person Name:  Donato    Notification Date/Time: 10/27/19 2616    Notification Interaction: Verbal     Purpose of Notification: Elevated Trop of 0.369, pt's c/o midsternal nonradiating chest pain, no significant changes on EKG (MD visualized)    Orders Received: Give pt dose of Maalox
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
## 2019-10-28 NOTE — DISCHARGE INSTRUCTIONS
Discharge Instructions for Atrial Fibrillation  You have been diagnosed with an abnormal heart rhythm called atrial fibrillation. With this condition, your heart s 2 upper chambers quiver rather than squeeze the blood out in a normal pattern. This leads to an irregular and sometimes rapid heartbeat. Some people will develop associated symptoms such as a flip-flopping heartbeat, chest pain, lightheadedness, or shortness of breath. Other people may have no symptoms at all. Atrial fibrillation is serious because it affects the heart s ability to fill with blood as it should. Blood clots may form. This increases the risk for stroke. Untreated atrial fibrillation can also lead to heart failure. Atrial fibrillation can be controlled. With treatment, most people with atrial fibrillation lead normal lives.  Treatment options  Recommended treatment for atrial fibrillation depends on your age, symptoms, how long you have had atrial fibrillation, and other factors. You will have a complete evaluation to find out if you have any abnormalities that caused your heart to go into atrial fibrillation. This might be blocked heart arteries or a thyroid problem. Your doctor will assess your particular case and discuss choices with you.  Treatment choices may include:    Treating an underlying disorder that puts you at risk for atrial fibrillation. For example, correcting an abnormal thyroid or electrolyte problem, or treating a blocked heart artery.    Restoring a normal heart rhythm with an electrical shock (cardioversion) or with an antiarrhythmic medicine (chemical cardioversion).    Using medicine to control your heart rate in atrial fibrillation.    Preventing the risk for blood clot and stroke using blood-thinning medicines. Your doctor will tell you what he or she recommends. Choices may include aspirin, clopidogrel, warfarin, dabigatran, rivaroxaban, apixaban, and edoxaban.    Doing catheter ablation or a surgical maze  procedure. These use different methods to destroy certain areas of heart tissue. This interrupts the electrical signals causing atrial fibrillation. One of these procedures may be a choice when medicines do not work, or as an alternative to long-term medicine.    Other treatment choices may be recommended for you by your doctor.  Managing risk factors for stroke and preventing heart failure are important parts of any treatment plan for atrial fibrillation.  Home care    Take your medicines exactly as directed. Don t skip doses.    Work with your doctor to find the right medicines and doses for you.    Learn to take your own pulse. Keep a record of your results. Ask your doctor which pulse rates mean that you need medical attention. Slowing your pulse is often the goal of treatment. Ask your doctor if it s OK for you to use an automatic machine to check your pulse at home. Sometimes these machines don t count the pulse correctly when you have atrial fibrillation.    Limit your intake of coffee, tea, cola, and other beverages with caffeine. Talk with your doctor about whether you should eliminate caffeine.    Avoid over-the-counter medicines that have caffeine in them.    Let your doctor know what medicines you take, including prescription and over-the-counter medicines, as well as any supplements. They interfere with some medicines given for atrial fibrillation.    Ask your doctor about whether you can drink alcohol. Some people need to avoid alcohol to better treat atrial fibrillation. If you are taking blood-thinner medicines, alcohol may interfere with them by increasing their effect.    Never take stimulants such as amphetamines or cocaine. These drugs can speed up your heart rate and trigger atrial fibrillation.  Follow-up care  Follow up with your doctor, or as advised.     When should I call my healthcare provider  Call your healthcare provider right away if you have any of the  following:    Weakness    Dizziness    Fainting    Fatigue    Shortness of breath    Chest pain with increased activity    A change in the usual regularity of your heartbeat, or an unusually fast heartbeat   Date Last Reviewed: 4/23/2016 2000-2018 [employer]. [address] [place] [address], [place] [address]. All rights reserved. This information is not intended as a substitute for professional medical care. Always follow your healthcare professional's instructions.                    Patient Education    Famotidine Chewable tablet    Famotidine Oral suspension    Famotidine Oral tablet    Famotidine Solution for injection  Famotidine Oral suspension  What is this medicine?  FAMOTIDINE (fa CALI ti dann) is a type of antihistamine that blocks the release of stomach acid. It is used to treat stomach or intestinal ulcers. It can also relieve heartburn from acid reflux.  This medicine may be used for other purposes; ask your health care provider or pharmacist if you have questions.  What should I tell my health care provider before I take this medicine?  They need to know if you have any of these conditions:    kidney or liver disease    an unusual or allergic reaction to famotidine, other medicines, foods, dyes, or preservatives    pregnant or trying to get pregnant    breast-feeding  How should I use this medicine?  Take this medicine by mouth. Follow the directions on the prescription label. Shake the bottle for 5 to 10 seconds. Use a specially marked spoon or container to measure your medicine. Ask your pharmacist if you do not have one. Household spoons are not accurate. If you only take this medicine once a day, take it at bedtime. Take your doses at regular intervals. Do not take your medicine more often than directed.  Talk to your pediatrician regarding the use of this medicine in children. Special care may be needed.  Overdosage: If you think you have taken too much of this medicine contact a poison  control center or emergency room at once.  NOTE: This medicine is only for you. Do not share this medicine with others.  What if I miss a dose?  If you miss a dose, take it as soon as you can. If it is almost time for your next dose, take only that dose. Do not take double or extra doses.  What may interact with this medicine?    delavirdine    itraconazole    ketoconazole  This list may not describe all possible interactions. Give your health care provider a list of all the medicines, herbs, non-prescription drugs, or dietary supplements you use. Also tell them if you smoke, drink alcohol, or use illegal drugs. Some items may interact with your medicine.  What should I watch for while using this medicine?  Tell your doctor or health care professional if your condition does not start to get better or if it gets worse. Finish the full course of medicine prescribed, even if you feel better.  Do not take with aspirin, ibuprofen or other antiinflammatory medicines. These can make your condition worse.  Do not smoke cigarettes or drink alcohol. These cause irritation in your stomach and can increase the time it will take for ulcers to heal.  If you get black, tarry stools or vomit up what looks like coffee grounds, call your doctor or health care professional at once. You may have a bleeding ulcer.  What side effects may I notice from receiving this medicine?  Side effects that you should report to your doctor or health care professional as soon as possible:    agitation, nervousness    confusion    hallucinations    skin rash, itching  Side effects that usually do not require medical attention (report to your doctor or health care professional if they continue or are bothersome):    constipation    diarrhea    dizziness    headache  This list may not describe all possible side effects. Call your doctor for medical advice about side effects. You may report side effects to FDA at 8-725-FDA-3767.  Where should I keep my  medicine?  Keep out of the reach of children.  Store at room temperature between 15 and 30 degrees C (59 and 86 degrees F). Do not freeze. Throw away any unused suspension after thirty days.  NOTE:This sheet is a summary. It may not cover all possible information. If you have questions about this medicine, talk to your doctor, pharmacist, or health care provider. Copyright  2016 [employer]          Aspirin, ASA oral tablets  Brand Names: Aspirtab, Aspir-Luz Maria, Efe Advanced Aspirin, Efe Aspirin, Efe Aspirin Extra Strength, Efe Aspirin Plus, Efe Extra Strength, Efe Extra Strength Plus, Efe Genuine Aspirin, Efe Womens Aspirin, Bufferin, Bufferin Extra Strength, Bufferin Low Dose  What is this medicine?  ASPIRIN (AS pir in) is a pain reliever. It is used to treat mild pain and fever. This medicine is also used as directed by a doctor to prevent and to treat heart attacks, to prevent strokes and blood clots, and to treat arthritis or inflammation.  How should I use this medicine?  Take this medicine by mouth with a glass of water. Follow the directions on the package or prescription label. You can take this medicine with or without food. If it upsets your stomach, take it with food. Do not take your medicine more often than directed.  Talk to your pediatrician regarding the use of this medicine in children. While this drug may be prescribed for children as young as 12 years of age for selected conditions, precautions do apply. Children and teenagers should not use this medicine to treat chicken pox or flu symptoms unless directed by a doctor.  Patients over 65 years old may have a stronger reaction and need a smaller dose.  What side effects may I notice from receiving this medicine?  Side effects that you should report to your doctor or health care professional as soon as possible:    allergic reactions like skin rash, itching or hives, swelling of the face, lips, or tongue    breathing  problems    changes in hearing, ringing in the ears    confusion    general ill feeling or flu-like symptoms    pain on swallowing    redness, blistering, peeling or loosening of the skin, including inside the mouth or nose    signs and symptoms of bleeding such as bloody or black, tarry stools; red or dark-brown urine; spitting up blood or brown material that looks like coffee grounds; red spots on the skin; unusual bruising or bleeding from the eye, gums, or nose    trouble passing urine or change in the amount of urine    unusually weak or tired    yellowing of the eyes or skin  Side effects that usually do not require medical attention (report to your doctor or health care professional if they continue or are bothersome):    diarrhea or constipation    headache    nausea, vomiting    stomach gas, heartburn  What may interact with this medicine?  Do not take this medicine with any of the following medications:    cidofovir    ketorolac    probenecid  This medicine may also interact with the following medications:    alcohol    alendronate    bismuth subsalicylate    flavocoxid    herbal supplements like feverfew, garlic, evelin, ginkgo biloba, horse chestnut    medicines for diabetes or glaucoma like acetazolamide, methazolamide    medicines for gout    medicines that treat or prevent blood clots like enoxaparin, heparin, ticlopidine, warfarin    other aspirin and aspirin-like medicines    NSAIDs, medicines for pain and inflammation, like ibuprofen or naproxen    pemetrexed    sulfinpyrazone    varicella live vaccine  What if I miss a dose?  If you are taking this medicine on a regular schedule and miss a dose, take it as soon as you can. If it is almost time for your next dose, take only that dose. Do not take double or extra doses.  Where should I keep my medicine?  Keep out of the reach of children.  Store at room temperature between 15 and 30 degrees C (59 and 86 degrees F). Protect from heat and moisture. Do  not use this medicine if it has a strong vinegar smell. Throw away any unused medicine after the expiration date.  What should I tell my health care provider before I take this medicine?  They need to know if you have any of these conditions:    anemia    asthma    bleeding problems    child with chickenpox, the flu, or other viral infection    diabetes    gout    if you frequently drink alcohol containing drinks    kidney disease    liver disease    low level of vitamin K    lupus    smoke tobacco    stomach ulcers or other problems    an unusual or allergic reaction to aspirin, tartrazine dye, other medicines, dyes, or preservatives    pregnant or trying to get pregnant    breast-feeding  What should I watch for while using this medicine?  If you are treating yourself for pain, tell your doctor or health care professional if the pain lasts more than 10 days, if it gets worse, or if there is a new or different kind of pain. Tell your doctor if you see redness or swelling. Also, check with your doctor if you have a fever that lasts for more than 3 days. Only take this medicine to prevent heart attacks or blood clotting if prescribed by your doctor or health care professional.  Do not take aspirin or aspirin-like medicines with this medicine. Too much aspirin can be dangerous. Always read the labels carefully.  This medicine can irritate your stomach or cause bleeding problems. Do not smoke cigarettes or drink alcohol while taking this medicine. Do not lie down for 30 minutes after taking this medicine to prevent irritation to your throat.  If you are scheduled for any medical or dental procedure, tell your healthcare provider that you are taking this medicine. You may need to stop taking this medicine before the procedure.  This medicine may be used to treat migraines. If you take migraine medicines for 10 or more days a month, your migraines may get worse. Keep a diary of headache days and medicine use. Contact  your healthcare professional if your migraine attacks occur more frequently.  NOTE:This sheet is a summary. It may not cover all possible information. If you have questions about this medicine, talk to your doctor, pharmacist, or health care provider. Copyright  2019 [employer]        Patient Education    Aluminum Hydroxide, Magnesium Hydroxide, Simethicone Chewable tablet    Aluminum Hydroxide, Magnesium Hydroxide, Simethicone Oral suspension  Aluminum Hydroxide, Magnesium Hydroxide, Simethicone Oral suspension  What is this medicine?  ALUMINUM HYDROXIDE; MAGNESIUM HYDROXIDE; SIMETHICONE (a LOO mi num jacklyn DROX bernabe; mag NEE zhum jacklyn DROX bernabe; sye METH i siri) is an antacid and antigas medicine. It is used to relieve the symptoms of indigestion, heartburn, sour stomach, and the discomfort caused by gas.  This medicine may be used for other purposes; ask your health care provider or pharmacist if you have questions.  What should I tell my health care provider before I take this medicine?  They need to know if you have any of these conditions:    bowel, intestinal, or stomach disease    constipation    diarrhea    kidney disease    liver disease    on a sodium (salt) restricted diet    stomach bleeding or obstruction    an unusual or allergic reaction to aluminum hydroxide, magnesium hydroxide, simethicone, other medicines, foods, dyes, or preservatives    pregnant or trying to get pregnant    breast-feeding  How should I use this medicine?  Take this medicine by mouth. Follow the directions on the label. Shake well before using. Use a specially marked spoon or container to measure your medicine. Ask your pharmacist if you do not have one. Household spoons are not accurate. Antacids are usually taken after meals and at bedtime or as directed by your doctor or health care professional. After taking the medication, drink a full glass of water. Take your doses at regular intervals. Do not take your medicine more often than  directed.  Talk to your pediatrician regarding the use of this medicine in children. While this drug may be prescribed for children as young as 6 years old for selected conditions, precautions do apply.  Overdosage: If you think you have taken too much of this medicine contact a poison control center or emergency room at once.  NOTE: This medicine is only for you. Do not share this medicine with others.  What if I miss a dose?  If you miss a dose, take it as soon as you can. If it is almost time for your next dose, take only that dose. Do not take double or extra doses.  What may interact with this medicine?    amphetamine    antibiotics    captopril    delavirdine    gabapentin    heart medicines, such as digoxin or digitoxin    hyoscyamine    iron salts    isoniazid    medicines for breathing difficulties like ipratropium and tiotropium    medicines for diabetes    medicines for fungal infections like itraconazole and ketoconazole    medicines for osteoporosis like alendronate, etidronate, risedronate and tiludronate    medicines for overactive bladder like oxybutynin and tolterodine    medicines for seizures like ethotoin and phenytoin    methenamine    mycophenolate    pancrelipase    penicillamine    phenothiazines like chlorpromazine, mesoridazine, prochlorperazine, thioridazine    quinidine    rosuvastatin    sodium fluoride    sodium polystyrene sulfonate    sotalol    sucralfate    tacrolimus    thyroid hormones like levothyroxine    ursodiol    vitamin D    zalcitabine  This list may not describe all possible interactions. Give your health care provider a list of all the medicines, herbs, non-prescription drugs, or dietary supplements you use. Also tell them if you smoke, drink alcohol, or use illegal drugs. Some items may interact with your medicine.  What should I watch for while using this medicine?  Tell your doctor or healthcare professional if your symptoms do not start to get better or if they get  worse. Do not treat yourself for stomach problems with this medicine for more than 2 weeks. See a doctor if you have black tarry stools, rectal bleeding, or if you feel unusually tired. Do not change to another antacid product without advice.  If you are taking other medicines, leave an interval of at least 2 hours before or after taking this medicine.  To help reduce constipation, drink several glasses of water a day.  What side effects may I notice from receiving this medicine?  Side effects that you should report to your doctor or health care professional as soon as possible:    allergic reactions like skin rash, itching or hives, swelling of the face, lips, or tongue    bone or joint aches and pains    confusion or irritability    headache    loss of appetite    nausea, vomiting    unusually weak or tired  Side effects that usually do not require medical attention (report to your doctor or health care professional if they continue or are bothersome):    chalky taste    constipation    diarrhea    hemorrhoids  This list may not describe all possible side effects. Call your doctor for medical advice about side effects. You may report side effects to FDA at 3-547-FDA-1775.  Where should I keep my medicine?  Keep out of the reach of children.  Store at room temperature between 15 and 30 degrees C (59 and 86 degrees F). Do not freeze. Protect from light and moisture. Throw away any unused medicine after the expiration date.  NOTE:This sheet is a summary. It may not cover all possible information. If you have questions about this medicine, talk to your doctor, pharmacist, or health care provider. Copyright  2016 [employer]

## 2019-10-28 NOTE — PLAN OF CARE
Pt stable over night, VSS, no c/o Chest pain or sob.  Pt in SR with rare PVC's.  Pt on Heparin and Cardizem gtts.  Pt  will be seen by [facility] today.  Will continue to monitor.

## 2019-10-29 ENCOUNTER — TELEPHONE (OUTPATIENT)
Dept: INTERNAL MEDICINE | Facility: CLINIC | Age: 74
End: 2019-10-29

## 2019-10-29 LAB — INTERPRETATION ECG - MUSE: NORMAL

## 2019-10-30 NOTE — TELEPHONE ENCOUNTER
Pt wife calling back . appt Friday with PCP . Check chest xray . Needs RX refill for HTN .Yoko Cobb RN  Having a little pain on right side . No trouble breathing . Pt was given Pepcid for healing of esophagus . Pt started that twice a day yesterday .

## 2019-10-30 NOTE — TELEPHONE ENCOUNTER
ED / Discharge Outreach Protocol    Patient Contact    Attempt # 2    Was call answered?  No.  Left message on voicemail with information to call me back.    Evita HINDS RN, BSN, PHN

## 2019-10-30 NOTE — TELEPHONE ENCOUNTER
"ED/Discharge Protocol    \"Hi, my name is Tessa Cobb RN, a registered nurse, and I am calling on behalf of Dr. Eller's office at [facility].  I am calling to follow up and see how things are going for you after your recent visit.\"    \"I see that you were in the (ER/UC/IP) on 10-27-19.    How are you doing now that you are home?\" has a little pain on right side no shortness of breath .     Is patient experiencing symptoms that may require a hospital visit?  no    Discharge Instructions    \"Let's review your discharge instructions.  What is/are the follow-up recommendations?  Pt. Response: follow up with PCP appt made prior to discharge    \"Were you instructed to make a follow-up appointment?\"  Pt. Response: Yes.  Has appointment been made?   Yes      \"When you see the provider, I would recommend that you bring your discharge instructions with you.    Medications    \"How many new medications are you on since your hospitalization/ED visit?\"    0-1  \"How many of your current medicines changed (dose, timing, name, etc.) while you were in the hospital/ED visit?\"   0-1  \"Do you have questions about your medications?\"   No  \"Were you newly diagnosed with heart failure, COPD, diabetes or did you have a heart attack?\"   No  For patients on insulin: \"Did you start on insulin in the hospital or did you have your insulin dose changed?\"   No  Post Discharge Medication Reconciliation Status: discharge medications reconciled, continue medications without change.    Was MTM referral placed (*Make sure to put transitions as reason for referral)?   No    Call Summary    \"Do you have any questions or concerns about your condition or care plan at the moment?\"    No  Triage nurse advice given: na    Patient was in ER once in the past year (assess appropriateness of ER visits.)      \"If you have questions or things don't continue to improve, we encourage you contact us through the main clinic number,  [phone].  Even if the " "clinic is not open, triage nurses are available 24/7 to help you.     We would like you to know that our clinic has extended hours (provide information).  We also have urgent care (provide details on closest location and hours/contact info)\"      \"Thank you for your time and take care!\"      "

## 2019-11-01 ENCOUNTER — ANCILLARY PROCEDURE (OUTPATIENT)
Dept: GENERAL RADIOLOGY | Facility: CLINIC | Age: 74
End: 2019-11-01
Attending: INTERNAL MEDICINE
Payer: COMMERCIAL

## 2019-11-01 ENCOUNTER — OFFICE VISIT (OUTPATIENT)
Dept: INTERNAL MEDICINE | Facility: CLINIC | Age: 74
End: 2019-11-01
Payer: COMMERCIAL

## 2019-11-01 VITALS
HEART RATE: 79 BPM | TEMPERATURE: 97.6 F | DIASTOLIC BLOOD PRESSURE: 70 MMHG | SYSTOLIC BLOOD PRESSURE: 120 MMHG | BODY MASS INDEX: 16.56 KG/M2 | OXYGEN SATURATION: 99 % | WEIGHT: 108.9 LBS | RESPIRATION RATE: 16 BRPM

## 2019-11-01 DIAGNOSIS — J93.9 PNEUMOTHORAX, UNSPECIFIED TYPE: ICD-10-CM

## 2019-11-01 DIAGNOSIS — I48.91 ATRIAL FIBRILLATION WITH RVR (H): Primary | ICD-10-CM

## 2019-11-01 DIAGNOSIS — K27.9 PUD (PEPTIC ULCER DISEASE): ICD-10-CM

## 2019-11-01 PROCEDURE — 99214 OFFICE O/P EST MOD 30 MIN: CPT | Performed by: INTERNAL MEDICINE

## 2019-11-01 PROCEDURE — 71046 X-RAY EXAM CHEST 2 VIEWS: CPT

## 2019-11-01 RX ORDER — FAMOTIDINE 20 MG/1
20 TABLET, FILM COATED ORAL 2 TIMES DAILY
Qty: 180 TABLET | Refills: 3 | Status: SHIPPED | OUTPATIENT
Start: 2019-11-01 | End: 2021-01-06 | Stop reason: ALTCHOICE

## 2019-11-01 ASSESSMENT — ASTHMA QUESTIONNAIRES
QUESTION_3 LAST FOUR WEEKS HOW OFTEN DID YOUR ASTHMA SYMPTOMS (WHEEZING, COUGHING, SHORTNESS OF BREATH, CHEST TIGHTNESS OR PAIN) WAKE YOU UP AT NIGHT OR EARLIER THAN USUAL IN THE MORNING: NOT AT ALL
QUESTION_2 LAST FOUR WEEKS HOW OFTEN HAVE YOU HAD SHORTNESS OF BREATH: NOT AT ALL
ACT_TOTALSCORE: 25
QUESTION_1 LAST FOUR WEEKS HOW MUCH OF THE TIME DID YOUR ASTHMA KEEP YOU FROM GETTING AS MUCH DONE AT WORK, SCHOOL OR AT HOME: NONE OF THE TIME
QUESTION_4 LAST FOUR WEEKS HOW OFTEN HAVE YOU USED YOUR RESCUE INHALER OR NEBULIZER MEDICATION (SUCH AS ALBUTEROL): NOT AT ALL
QUESTION_5 LAST FOUR WEEKS HOW WOULD YOU RATE YOUR ASTHMA CONTROL: COMPLETELY CONTROLLED
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
## 2019-11-02 ASSESSMENT — ASTHMA QUESTIONNAIRES: ACT_TOTALSCORE: 25

## 2019-11-12 ENCOUNTER — OFFICE VISIT (OUTPATIENT)
Dept: CARDIOLOGY | Facility: CLINIC | Age: 74
End: 2019-11-12
Payer: COMMERCIAL

## 2019-11-12 VITALS
WEIGHT: 115 LBS | BODY MASS INDEX: 17.43 KG/M2 | SYSTOLIC BLOOD PRESSURE: 175 MMHG | DIASTOLIC BLOOD PRESSURE: 77 MMHG | HEIGHT: 68 IN | HEART RATE: 72 BPM

## 2019-11-12 DIAGNOSIS — I48.0 PAROXYSMAL A-FIB (H): Primary | ICD-10-CM

## 2019-11-12 DIAGNOSIS — I10 ESSENTIAL HYPERTENSION: ICD-10-CM

## 2019-11-12 PROCEDURE — 99214 OFFICE O/P EST MOD 30 MIN: CPT | Performed by: INTERNAL MEDICINE

## 2019-11-12 PROCEDURE — 93000 ELECTROCARDIOGRAM COMPLETE: CPT | Performed by: INTERNAL MEDICINE

## 2019-11-12 RX ORDER — LISINOPRIL 20 MG/1
20 TABLET ORAL DAILY
Qty: 90 TABLET | Refills: 3 | Status: SHIPPED | OUTPATIENT
Start: 2019-11-12 | End: 2020-11-19

## 2019-11-12 ASSESSMENT — MIFFLIN-ST. JEOR: SCORE: 1246.14

## 2019-11-12 NOTE — LETTER
11/12/2019    Kash Eller MD  [address] [place] [id number]    RE: Gavin House       Dear Colleague,    I had the pleasure of seeing Gavin House in the [facility].    HPI and Plan:   See dictation    Orders Placed This Encounter   Procedures     Follow-Up with Cardiac Advanced Practice Provider     Follow-Up with Electrophysiologist     EKG 12-lead complete w/read - Clinics     EKG 12-lead complete w/read (Future)- to be scheduled       Orders Placed This Encounter   Medications     metroNIDAZOLE (NORITATE) 1 % external cream     Sig: Apply topically daily     lisinopril (PRINIVIL/ZESTRIL) 20 MG tablet     Sig: Take 1 tablet (20 mg) by mouth daily     Dispense:  90 tablet     Refill:  3       Medications Discontinued During This Encounter   Medication Reason     lisinopril (PRINIVIL/ZESTRIL) 10 MG tablet          Encounter Diagnoses   Name Primary?     Paroxysmal A-fib (H) Yes     Essential hypertension        CURRENT MEDICATIONS:  Current Outpatient Medications   Medication Sig Dispense Refill     aspirin (ASA) 325 MG EC tablet Take 1 tablet (325 mg) by mouth daily 100 tablet 3     EPINEPHrine (PRIMATENE MIST) 0.125 MG/ACT AERO Inhale 1 puff into the lungs daily as needed       famotidine (PEPCID) 20 MG tablet Take 1 tablet (20 mg) by mouth 2 times daily 180 tablet 3     flecainide (TAMBOCOR) 100 MG tablet Take 1 tablet (100 mg) by mouth 2 times daily 180 tablet 0     lisinopril (PRINIVIL/ZESTRIL) 20 MG tablet Take 1 tablet (20 mg) by mouth daily 90 tablet 3     metroNIDAZOLE (NORITATE) 1 % external cream Apply topically daily       UNKNOWN TO PATIENT Topical used for rosacea - apply to face daily at bedtime.       alum & mag hydroxide-simethicone (MYLANTA ES/MAALOX  ES) 400-400-40 MG/5ML SUSP suspension Take 30 mLs by mouth every 4 hours as needed for indigestion (Patient not taking: Reported on 11/12/2019)         ALLERGIES     Allergies   Allergen Reactions  "    Penicillins        PAST MEDICAL HISTORY:  Past Medical History:   Diagnosis Date     Alcohol use      Allergic rhinitis, cause unspecified      Diverticulosis of colon     Diverticulitis 2010     Mild intermittent asthma      Paroxysmal atrial fibrillation (H) 2016     PVC (premature ventricular contraction)     intermittent bigeminy     Rosacea      Scoliosis (and kyphoscoliosis), idiopathic        PAST SURGICAL HISTORY:  Past Surgical History:   Procedure Laterality Date     C NONSPECIFIC PROCEDURE  1974    right inguinal herniorraphy     C NONSPECIFIC PROCEDURE  age 16    L inguinal herniorraphy       FAMILY HISTORY:  Family History   Problem Relation Age of Onset     Colon Cancer Father          of this age 69     Alcoholism Father      Heart Disease Mother         \"enlarged heart\"; d: age 75     C.A.D. Brother          in his 80's      Alcoholism Brother          in his 60's        SOCIAL HISTORY:  Social History     Socioeconomic History     Marital status:      Spouse name: None     Number of children: 2     Years of education: None     Highest education level: None   Occupational History     Occupation: Retired manufact[employer]     Employer: [employer]   Social Needs     Financial resource strain: None     Food insecurity:     Worry: None     Inability: None     Transportation needs:     Medical: None     Non-medical: None   Tobacco Use     Smoking status: Former Smoker     Packs/day: 1.00     Years: 23.00     Pack years: 23.00     Types: Cigarettes     Start date:      Last attempt to quit:      Years since quittin.8     Smokeless tobacco: Never Used   Substance and Sexual Activity     Alcohol use: Yes     Alcohol/week: 0.0 standard drinks     Comment: 4 vodka drinks a day ... or more     Drug use: No     Sexual activity: Yes     Partners: Female   Lifestyle     Physical activity:     Days per week: None     Minutes per session: None     Stress: None " "  Relationships     Social connections:     Talks on phone: None     Gets together: None     Attends Rastafari service: None     Active member of club or organization: None     Attends meetings of clubs or organizations: None     Relationship status: None     Intimate partner violence:     Fear of current or ex partner: None     Emotionally abused: None     Physically abused: None     Forced sexual activity: None   Other Topics Concern     Parent/sibling w/ CABG, MI or angioplasty before 65F 55M? No   Social History Narrative    , has 2 children, is retired from his manufacturing job as of age 68.  He goes to the bar daily to socialize and drink -- does not want to stop drinking.  (last updated 10/27/2019)        Review of Systems:  Skin:  Positive for rash rosacea   Eyes:  Positive for glasses;tearing    ENT:  Negative      Respiratory:  Negative       Cardiovascular:  Negative      Gastroenterology: Positive for heartburn    Genitourinary:  Negative      Musculoskeletal:  Negative      Neurologic:  Negative      Psychiatric:  Negative      Heme/Lymph/Imm:  Positive for allergies    Endocrine:  Negative        Physical Exam:  Vitals: BP (!) 175/77   Pulse 72   Ht 1.727 m (5' 8\")   Wt 52.2 kg (115 lb)   BMI 17.49 kg/m       Constitutional:  cooperative, alert and oriented, well developed, well nourished, in no acute distress        Skin:  warm and dry to the touch, no apparent skin lesions or masses noted          Head:  normocephalic, no masses or lesions        Eyes:  pupils equal and round, conjunctivae and lids unremarkable, sclera white, no xanthalasma, EOMS intact, no nystagmus        Lymph:      ENT:  no pallor or cyanosis, dentition good        Neck:           Respiratory:  normal breath sounds, clear to auscultation, normal A-P diameter, normal symmetry, normal respiratory excursion, no use of accessory muscles         Cardiac: regular rhythm, normal S1/S2, no S3 or S4, apical impulse not " displaced, no murmurs, gallops or rubs                not assessed this visit                                        GI:  abdomen soft;BS normoactive        Extremities and Muscular Skeletal:  no deformities, clubbing, cyanosis, erythema observed;no edema              Neurological:  no gross motor deficits        Psych:  Alert and Oriented x 3;affect appropriate, oriented to time, person and place        CC  No referring provider defined for this encounter.                Thank you for allowing me to participate in the care of your patient.      Sincerely,     Lissette Verma MD     [facility]    cc:   No referring provider defined for this encounter.
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
## 2019-11-12 NOTE — PROGRESS NOTES
HPI and Plan:   See dictation    Orders Placed This Encounter   Procedures     Follow-Up with Cardiac Advanced Practice Provider     Follow-Up with Electrophysiologist     EKG 12-lead complete w/read - Clinics     EKG 12-lead complete w/read (Future)- to be scheduled       Orders Placed This Encounter   Medications     metroNIDAZOLE (NORITATE) 1 % external cream     Sig: Apply topically daily     lisinopril (PRINIVIL/ZESTRIL) 20 MG tablet     Sig: Take 1 tablet (20 mg) by mouth daily     Dispense:  90 tablet     Refill:  3       Medications Discontinued During This Encounter   Medication Reason     lisinopril (PRINIVIL/ZESTRIL) 10 MG tablet          Encounter Diagnoses   Name Primary?     Paroxysmal A-fib (H) Yes     Essential hypertension        CURRENT MEDICATIONS:  Current Outpatient Medications   Medication Sig Dispense Refill     aspirin (ASA) 325 MG EC tablet Take 1 tablet (325 mg) by mouth daily 100 tablet 3     EPINEPHrine (PRIMATENE MIST) 0.125 MG/ACT AERO Inhale 1 puff into the lungs daily as needed       famotidine (PEPCID) 20 MG tablet Take 1 tablet (20 mg) by mouth 2 times daily 180 tablet 3     flecainide (TAMBOCOR) 100 MG tablet Take 1 tablet (100 mg) by mouth 2 times daily 180 tablet 0     lisinopril (PRINIVIL/ZESTRIL) 20 MG tablet Take 1 tablet (20 mg) by mouth daily 90 tablet 3     metroNIDAZOLE (NORITATE) 1 % external cream Apply topically daily       UNKNOWN TO PATIENT Topical used for rosacea - apply to face daily at bedtime.       alum & mag hydroxide-simethicone (MYLANTA ES/MAALOX  ES) 400-400-40 MG/5ML SUSP suspension Take 30 mLs by mouth every 4 hours as needed for indigestion (Patient not taking: Reported on 11/12/2019)         ALLERGIES     Allergies   Allergen Reactions     Penicillins        PAST MEDICAL HISTORY:  Past Medical History:   Diagnosis Date     Alcohol use      Allergic rhinitis, cause unspecified      Diverticulosis of colon     Diverticulitis 5/2010     Mild intermittent  "asthma      Paroxysmal atrial fibrillation (H) 2016     PVC (premature ventricular contraction)     intermittent bigeminy     Rosacea      Scoliosis (and kyphoscoliosis), idiopathic        PAST SURGICAL HISTORY:  Past Surgical History:   Procedure Laterality Date     C NONSPECIFIC PROCEDURE  1974    right inguinal herniorraphy     C NONSPECIFIC PROCEDURE  age 16    L inguinal herniorraphy       FAMILY HISTORY:  Family History   Problem Relation Age of Onset     Colon Cancer Father          of this age 69     Alcoholism Father      Heart Disease Mother         \"enlarged heart\"; d: age 75     C.A.D. 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 or more     Drug use: No     Sexual activity: Yes     Partners: Female   Lifestyle     Physical activity:     Days per week: None     Minutes per session: None     Stress: None   Relationships     Social connections:     Talks on phone: None     Gets together: None     Attends Buddhist service: None     Active member of club or organization: None     Attends meetings of clubs or organizations: None     " "Relationship status: None     Intimate partner violence:     Fear of current or ex partner: None     Emotionally abused: None     Physically abused: None     Forced sexual activity: None   Other Topics Concern     Parent/sibling w/ CABG, MI or angioplasty before 65F 55M? No   Social History Narrative    , has 2 children, is retired from his manufacturing job as of age 68.  He goes to the bar daily to socialize and drink -- does not want to stop drinking.  (last updated 10/27/2019)        Review of Systems:  Skin:  Positive for rash rosacea   Eyes:  Positive for glasses;tearing    ENT:  Negative      Respiratory:  Negative       Cardiovascular:  Negative      Gastroenterology: Positive for heartburn    Genitourinary:  Negative      Musculoskeletal:  Negative      Neurologic:  Negative      Psychiatric:  Negative      Heme/Lymph/Imm:  Positive for allergies    Endocrine:  Negative        Physical Exam:  Vitals: BP (!) 175/77   Pulse 72   Ht 1.727 m (5' 8\")   Wt 52.2 kg (115 lb)   BMI 17.49 kg/m      Constitutional:  cooperative, alert and oriented, well developed, well nourished, in no acute distress        Skin:  warm and dry to the touch, no apparent skin lesions or masses noted          Head:  normocephalic, no masses or lesions        Eyes:  pupils equal and round, conjunctivae and lids unremarkable, sclera white, no xanthalasma, EOMS intact, no nystagmus        Lymph:      ENT:  no pallor or cyanosis, dentition good        Neck:           Respiratory:  normal breath sounds, clear to auscultation, normal A-P diameter, normal symmetry, normal respiratory excursion, no use of accessory muscles         Cardiac: regular rhythm, normal S1/S2, no S3 or S4, apical impulse not displaced, no murmurs, gallops or rubs                not assessed this visit                                        GI:  abdomen soft;BS normoactive        Extremities and Muscular Skeletal:  no deformities, clubbing, cyanosis, erythema " observed;no edema              Neurological:  no gross motor deficits        Psych:  Alert and Oriented x 3;affect appropriate, oriented to time, person and place        CC  No referring provider defined for this encounter.

## 2019-11-25 DIAGNOSIS — I48.0 PAROXYSMAL ATRIAL FIBRILLATION (H): ICD-10-CM

## 2019-11-25 RX ORDER — FLECAINIDE ACETATE 100 MG/1
100 TABLET ORAL 2 TIMES DAILY
Qty: 180 TABLET | Refills: 3 | Status: SHIPPED | OUTPATIENT
Start: 2019-11-25 | End: 2020-12-03

## 2020-11-19 DIAGNOSIS — I10 ESSENTIAL HYPERTENSION: ICD-10-CM

## 2020-11-19 RX ORDER — LISINOPRIL 20 MG/1
20 TABLET ORAL DAILY
Qty: 90 TABLET | Refills: 0 | Status: SHIPPED | OUTPATIENT
Start: 2020-11-19 | End: 2020-12-03

## 2020-12-03 ENCOUNTER — TELEPHONE (OUTPATIENT)
Dept: CARDIOLOGY | Facility: CLINIC | Age: 75
End: 2020-12-03

## 2020-12-03 DIAGNOSIS — I48.0 PAROXYSMAL ATRIAL FIBRILLATION (H): ICD-10-CM

## 2020-12-03 DIAGNOSIS — I10 ESSENTIAL HYPERTENSION: ICD-10-CM

## 2020-12-03 RX ORDER — FLECAINIDE ACETATE 100 MG/1
100 TABLET ORAL 2 TIMES DAILY
Qty: 180 TABLET | Refills: 0 | Status: SHIPPED | OUTPATIENT
Start: 2020-12-03 | End: 2021-01-06

## 2020-12-03 RX ORDER — LISINOPRIL 20 MG/1
20 TABLET ORAL DAILY
Qty: 90 TABLET | Refills: 0 | Status: SHIPPED | OUTPATIENT
Start: 2020-12-03 | End: 2021-01-06

## 2020-12-03 NOTE — TELEPHONE ENCOUNTER
Received call from patient requesting flecainide and lisinopril. Patient is overdue for follow up with Ramona Walters and has not had an EKG since 11/2019. Sent 90 day refills to his preferred pharmacy. Transferred patient to scheduling to set up and appointment with Ramona and EKG.

## 2021-01-06 ENCOUNTER — OFFICE VISIT (OUTPATIENT)
Dept: CARDIOLOGY | Facility: CLINIC | Age: 76
End: 2021-01-06
Attending: INTERNAL MEDICINE
Payer: COMMERCIAL

## 2021-01-06 VITALS
WEIGHT: 112.8 LBS | BODY MASS INDEX: 17.1 KG/M2 | HEART RATE: 81 BPM | OXYGEN SATURATION: 99 % | HEIGHT: 68 IN | SYSTOLIC BLOOD PRESSURE: 158 MMHG | DIASTOLIC BLOOD PRESSURE: 80 MMHG

## 2021-01-06 DIAGNOSIS — I10 ESSENTIAL HYPERTENSION: Primary | ICD-10-CM

## 2021-01-06 DIAGNOSIS — I48.0 PAROXYSMAL A-FIB (H): ICD-10-CM

## 2021-01-06 PROCEDURE — 93000 ELECTROCARDIOGRAM COMPLETE: CPT | Performed by: NURSE PRACTITIONER

## 2021-01-06 PROCEDURE — 99214 OFFICE O/P EST MOD 30 MIN: CPT | Mod: 25 | Performed by: NURSE PRACTITIONER

## 2021-01-06 RX ORDER — LISINOPRIL 20 MG/1
20 TABLET ORAL DAILY
Qty: 90 TABLET | Refills: 3 | Status: SHIPPED | OUTPATIENT
Start: 2021-01-06 | End: 2022-03-07

## 2021-01-06 RX ORDER — FLECAINIDE ACETATE 100 MG/1
100 TABLET ORAL 2 TIMES DAILY
Qty: 180 TABLET | Refills: 3 | Status: SHIPPED | OUTPATIENT
Start: 2021-01-06 | End: 2022-03-07

## 2021-01-06 ASSESSMENT — MIFFLIN-ST. JEOR: SCORE: 1226.16

## 2021-01-06 NOTE — PATIENT INSTRUCTIONS
Call my nurse with any questions or concerns:  [phone]  *If you have concerns after hours, please call [phone], option 2 to speak with on call Cardiologist.    1. Medication changes from today:   Consider getting an [employer] blood pressure machine to check your blood pressure. B/P goal <140/<80   Call with any concerns  Have your blood pressure rechecked with  in 2-3 months

## 2021-01-06 NOTE — PROGRESS NOTES
HPI and Plan: #[id number]  See dictation    Orders Placed This Encounter   Procedures     EKG 12-lead complete w/read - Clinics (performed today)       Orders Placed This Encounter   Medications     Ca Carbonate-Mag Hydroxide (ROLAIDS PO)     Sig: Take by mouth as needed     flecainide (TAMBOCOR) 100 MG tablet     Sig: Take 1 tablet (100 mg) by mouth 2 times daily     Dispense:  180 tablet     Refill:  3     lisinopril (ZESTRIL) 20 MG tablet     Sig: Take 1 tablet (20 mg) by mouth daily     Dispense:  90 tablet     Refill:  3       Medications Discontinued During This Encounter   Medication Reason     alum & mag hydroxide-simethicone (MYLANTA ES/MAALOX  ES) 400-400-40 MG/5ML SUSP suspension Stopped by Patient     famotidine (PEPCID) 20 MG tablet Alternate therapy     UNKNOWN TO PATIENT Medication Reconciliation Clean Up     flecainide (TAMBOCOR) 100 MG tablet Reorder     lisinopril (ZESTRIL) 20 MG tablet Reorder         Encounter Diagnoses   Name Primary?     Paroxysmal A-fib (H)      Paroxysmal atrial fibrillation (H)      Essential hypertension        CURRENT MEDICATIONS:  Current Outpatient Medications   Medication Sig Dispense Refill     aspirin (ASA) 325 MG EC tablet Take 1 tablet (325 mg) by mouth daily 100 tablet 3     Ca Carbonate-Mag Hydroxide (ROLAIDS PO) Take by mouth as needed       EPINEPHrine (PRIMATENE MIST) 0.125 MG/ACT AERO Inhale 1 puff into the lungs daily as needed       flecainide (TAMBOCOR) 100 MG tablet Take 1 tablet (100 mg) by mouth 2 times daily 180 tablet 3     lisinopril (ZESTRIL) 20 MG tablet Take 1 tablet (20 mg) by mouth daily 90 tablet 3     metroNIDAZOLE (NORITATE) 1 % external cream Apply topically daily         ALLERGIES     Allergies   Allergen Reactions     Penicillins        PAST MEDICAL HISTORY:  Past Medical History:   Diagnosis Date     Alcohol use      Allergic rhinitis, cause unspecified      Diverticulosis of colon     Diverticulitis 5/2010     Mild intermittent asthma       "Paroxysmal atrial fibrillation (H) 2016     PVC (premature ventricular contraction)     intermittent bigeminy     Rosacea      Scoliosis (and kyphoscoliosis), idiopathic        PAST SURGICAL HISTORY:  Past Surgical History:   Procedure Laterality Date     [facility] NONSPECIFIC PROCEDURE  1974    right inguinal herniorraphy     Z NONSPECIFIC PROCEDURE  age 16    L inguinal herniorraphy       FAMILY HISTORY:  Family History   Problem Relation Age of Onset     Colon Cancer Father          of this age 69     Alcoholism Father      Heart Disease Mother         \"enlarged heart\"; d: age 75     C.A.D. Brother          in his 80's      Alcoholism Brother          in his 60's        SOCIAL HISTORY:  Social History     Socioeconomic History     Marital status:      Spouse name: None     Number of children: 2     Years of education: None     Highest education level: None   Occupational History     Occupation: Retired [employer]     Employer: [employer]   Social Needs     Financial resource strain: None     Food insecurity     Worry: None     Inability: None     Transportation needs     Medical: None     Non-medical: None   Tobacco Use     Smoking status: Former Smoker     Packs/day: 1.00     Years: 23.00     Pack years: 23.00     Types: Cigarettes     Start date:      Quit date:      Years since quittin.0     Smokeless tobacco: Never Used   Substance and Sexual Activity     Alcohol use: Yes     Alcohol/week: 0.0 standard drinks     Comment: 2 vodka drinks a day ... or more     Drug use: No     Sexual activity: Yes     Partners: Female   Lifestyle     Physical activity     Days per week: None     Minutes per session: None     Stress: None   Relationships     Social connections     Talks on phone: None     Gets together: None     Attends Buddhist service: None     Active member of club or organization: None     Attends meetings of clubs or organizations: None     Relationship status: " "None     Intimate partner violence     Fear of current or ex partner: None     Emotionally abused: None     Physically abused: None     Forced sexual activity: None   Other Topics Concern     Parent/sibling w/ CABG, MI or angioplasty before 65F 55M? No   Social History Narrative    , has 2 children, is retired from his manufacturing job as of age 68.  He goes to the bar daily to socialize and drink -- does not want to stop drinking.  (last updated 10/27/2019)        Review of Systems:  Skin:  Negative       Eyes:  Positive for glasses    ENT:  Negative      Respiratory:  Positive for dyspnea on exertion slight   Cardiovascular:  Negative      Gastroenterology: Positive for heartburn treatable with half of a Rolaid  Genitourinary:  Negative      Musculoskeletal:  Negative      Neurologic:  Negative      Psychiatric:  Negative      Heme/Lymph/Imm:  Negative      Endocrine:  Negative        Physical Exam:  Vitals: BP (!) 158/80   Pulse 81   Ht 1.727 m (5' 8\")   Wt 51.2 kg (112 lb 12.8 oz)   SpO2 99%   BMI 17.15 kg/m      Constitutional:  cooperative, alert and oriented, well developed, well nourished, in no acute distress thin      Skin:  warm and dry to the touch;no apparent skin lesions or masses noted          Head:  normocephalic, no masses or lesions        Eyes:  pupils equal and round;conjunctivae and lids unremarkable        Lymph:      ENT:  no pallor or cyanosis, dentition good        Neck:  carotid pulses are full and equal bilaterally, JVP normal, no carotid bruit        Respiratory:  normal breath sounds, clear to auscultation, normal A-P diameter, normal symmetry, normal respiratory excursion, no use of accessory muscles         Cardiac: regular rhythm;normal S1 and S2;no murmurs, gallops or rubs detected                not assessed this visit                                        GI:  abdomen soft        Extremities and Muscular Skeletal:  no deformities, clubbing, cyanosis, erythema " observed;no edema              Neurological:  no gross motor deficits        Psych:  Alert and Oriented x 3        CC  Kash Eller MD  [address] [place] [id number]

## 2021-01-06 NOTE — LETTER
1/6/2021    Kash Eller MD  [address] [place] [id number]    RE: Gavin House       Dear Colleague,    I had the pleasure of seeing Gavin House in the [facility].    HPI and Plan: #[id number]  See dictation    Orders Placed This Encounter   Procedures     EKG 12-lead complete w/read - Clinics (performed today)       Orders Placed This Encounter   Medications     Ca Carbonate-Mag Hydroxide (ROLAIDS PO)     Sig: Take by mouth as needed     flecainide (TAMBOCOR) 100 MG tablet     Sig: Take 1 tablet (100 mg) by mouth 2 times daily     Dispense:  180 tablet     Refill:  3     lisinopril (ZESTRIL) 20 MG tablet     Sig: Take 1 tablet (20 mg) by mouth daily     Dispense:  90 tablet     Refill:  3       Medications Discontinued During This Encounter   Medication Reason     alum & mag hydroxide-simethicone (MYLANTA ES/MAALOX  ES) 400-400-40 MG/5ML SUSP suspension Stopped by Patient     famotidine (PEPCID) 20 MG tablet Alternate therapy     UNKNOWN TO PATIENT Medication Reconciliation Clean Up     flecainide (TAMBOCOR) 100 MG tablet Reorder     lisinopril (ZESTRIL) 20 MG tablet Reorder         Encounter Diagnoses   Name Primary?     Paroxysmal A-fib (H)      Paroxysmal atrial fibrillation (H)      Essential hypertension        CURRENT MEDICATIONS:  Current Outpatient Medications   Medication Sig Dispense Refill     aspirin (ASA) 325 MG EC tablet Take 1 tablet (325 mg) by mouth daily 100 tablet 3     Ca Carbonate-Mag Hydroxide (ROLAIDS PO) Take by mouth as needed       EPINEPHrine (PRIMATENE MIST) 0.125 MG/ACT AERO Inhale 1 puff into the lungs daily as needed       flecainide (TAMBOCOR) 100 MG tablet Take 1 tablet (100 mg) by mouth 2 times daily 180 tablet 3     lisinopril (ZESTRIL) 20 MG tablet Take 1 tablet (20 mg) by mouth daily 90 tablet 3     metroNIDAZOLE (NORITATE) 1 % external cream Apply topically daily         ALLERGIES     Allergies   Allergen Reactions      "Penicillins        PAST MEDICAL HISTORY:  Past Medical History:   Diagnosis Date     Alcohol use      Allergic rhinitis, cause unspecified      Diverticulosis of colon     Diverticulitis 2010     Mild intermittent asthma      Paroxysmal atrial fibrillation (H) 2016     PVC (premature ventricular contraction)     intermittent bigeminy     Rosacea      Scoliosis (and kyphoscoliosis), idiopathic        PAST SURGICAL HISTORY:  Past Surgical History:   Procedure Laterality Date     [facility] NONSPECIFIC PROCEDURE  1974    right inguinal herniorraphy     Z NONSPECIFIC PROCEDURE  age 16    L inguinal herniorraphy       FAMILY HISTORY:  Family History   Problem Relation Age of Onset     Colon Cancer Father          of this age 69     Alcoholism Father      Heart Disease Mother         \"enlarged heart\"; d: age 75     C.A.D. Brother          in his 80's      Alcoholism Brother          in his 60's        SOCIAL HISTORY:  Social History     Socioeconomic History     Marital status:      Spouse name: None     Number of children: 2     Years of education: None     Highest education level: None   Occupational History     Occupation: Retired [employer]     Employer: [employer]   Social Needs     Financial resource strain: None     Food insecurity     Worry: None     Inability: None     Transportation needs     Medical: None     Non-medical: None   Tobacco Use     Smoking status: Former Smoker     Packs/day: 1.00     Years: 23.00     Pack years: 23.00     Types: Cigarettes     Start date:      Quit date:      Years since quittin.0     Smokeless tobacco: Never Used   Substance and Sexual Activity     Alcohol use: Yes     Alcohol/week: 0.0 standard drinks     Comment: 2 vodka drinks a day ... or more     Drug use: No     Sexual activity: Yes     Partners: Female   Lifestyle     Physical activity     Days per week: None     Minutes per session: None     Stress: None   Relationships     " "Social connections     Talks on phone: None     Gets together: None     Attends Holiness service: None     Active member of club or organization: None     Attends meetings of clubs or organizations: None     Relationship status: None     Intimate partner violence     Fear of current or ex partner: None     Emotionally abused: None     Physically abused: None     Forced sexual activity: None   Other Topics Concern     Parent/sibling w/ CABG, MI or angioplasty before 65F 55M? No   Social History Narrative    , has 2 children, is retired from his manufacturing job as of age 68.  He goes to the bar daily to socialize and drink -- does not want to stop drinking.  (last updated 10/27/2019)        Review of Systems:  Skin:  Negative       Eyes:  Positive for glasses    ENT:  Negative      Respiratory:  Positive for dyspnea on exertion slight   Cardiovascular:  Negative      Gastroenterology: Positive for heartburn treatable with half of a Rolaid  Genitourinary:  Negative      Musculoskeletal:  Negative      Neurologic:  Negative      Psychiatric:  Negative      Heme/Lymph/Imm:  Negative      Endocrine:  Negative        Physical Exam:  Vitals: BP (!) 158/80   Pulse 81   Ht 1.727 m (5' 8\")   Wt 51.2 kg (112 lb 12.8 oz)   SpO2 99%   BMI 17.15 kg/m      Constitutional:  cooperative, alert and oriented, well developed, well nourished, in no acute distress thin      Skin:  warm and dry to the touch;no apparent skin lesions or masses noted          Head:  normocephalic, no masses or lesions        Eyes:  pupils equal and round;conjunctivae and lids unremarkable        Lymph:      ENT:  no pallor or cyanosis, dentition good        Neck:  carotid pulses are full and equal bilaterally, JVP normal, no carotid bruit        Respiratory:  normal breath sounds, clear to auscultation, normal A-P diameter, normal symmetry, normal respiratory excursion, no use of accessory muscles         Cardiac: regular rhythm;normal S1 and " S2;no murmurs, gallops or rubs detected                not assessed this visit                                        GI:  abdomen soft        Extremities and Muscular Skeletal:  no deformities, clubbing, cyanosis, erythema observed;no edema              Neurological:  no gross motor deficits        Psych:  Alert and Oriented x 3        CC  Kash Eller MD  [address] [place] [address]                  Thank you for allowing me to participate in the care of your patient.      Sincerely,     Ramona Walters, NP, APRN [facility]    cc:   Kash Eller MD  [address] [place], [place] [address]

## 2021-01-06 NOTE — LETTER
1/6/2021      Kash Eller MD  [address] [place] [id number]      RE: Gavin House       Dear Colleague,    I had the pleasure of seeing Gavin House in the [facility].    Service Date: 01/06/2021      HISTORY OF PRESENT ILLNESS:  Mr. House is a delightful 75-year-old gentleman that I am having the pleasure of seeing again today in followup.  He is an established patient of Dr. Verma.  He has a past medical history that includes:   1.  Hypertension.   2.  Symptomatic paroxysmal atrial fibrillation for which he is on flecainide 100 mg b.i.d.  His CHADS-VASc score is 2 (hypertension and age).  He is on aspirin therapy.   3.  Questionable asthma or lung disease.  The patient has used Primatene mist in the past.  This is not a formal diagnosis.      At today's visit, Aman is doing well.  He denies chest pain, shortness of breath, lightheadedness or dizziness.  I do note that his blood pressure is elevated.  He is very anxious during our visit.  He states that his wife tells him he needs to calm his anxiety down.  His blood pressure was initially elevated and does come down to 158/80.  He is almost positive he took his medications today, which is lisinopril 20 mg daily.      He denies chest pain, shortness of breath, lightheadedness or dizziness.  Sometimes he states he gets congestion in his chest, but is able to cough up sputum.      A 12-lead EKG completed today shows a sinus rhythm with a QRS stable at 96 milliseconds.  There was 1 single PVC noted.  When compared to the previous EKG, no acute changes are noted.  All other review of systems and past medical history are noted below.      ASSESSMENT AND PLAN:  Mr. House is a delightful 75-year-old gentleman here today for followup.   1.  Symptomatic atrial fibrillation on flecainide 100 mg b.i.d.  His QRS is stable on EKG today.  He is on aspirin therapy.  CHADS-VASc score is 2.  Historically, he has had issues with  atrial fibrillation after excessive alcohol use in the past.  He has not had any issues or concerns regarding this.   2.  Hypertension.  His blood pressure is elevated and was also noted to be elevated last year as well.  I have encouraged him to follow up with Dr. Eller in 2-3 months.  I have also encouraged him to purchase a blood pressure cuff and monitor this.  He may need further up-titration of his lisinopril.  Echocardiogram last in  showed an EF of 55%-60% with grade I diastolic dysfunction.  He just had a trace of valvular insufficiency.  It should be noted, he also had a treadmill to rule out proarrhythmia of his flecainide, which was negative.      As always, I appreciate being involved in Aman's care.  Again, his elevated blood pressure could be because he did not take his lisinopril.  He was fairly certain he did, however.  He has not taken his Primatene mist for some time, so this should not be an issue.  He will consider getting a blood pressure cuff and let us know if his numbers are elevated.  Blood pressure goal is less than 140/80.      As always, thank you for including us in his care.  Feel free to contact us if you have questions or concerns regarding today's assessment and plan.         WERO PITTS NP             D: 2021   T: 2021   MT: MUKESH      Name:     ZOIE DEL VALLE   MRN:      [id number]        Account:      [id number]   :      1945           Service Date: 2021      Document: [id number]          Outpatient Encounter Medications as of 2021   Medication Sig Dispense Refill     aspirin (ASA) 325 MG EC tablet Take 1 tablet (325 mg) by mouth daily 100 tablet 3     Ca Carbonate-Mag Hydroxide (ROLAIDS PO) Take by mouth as needed       EPINEPHrine (PRIMATENE MIST) 0.125 MG/ACT AERO Inhale 1 puff into the lungs daily as needed       flecainide (TAMBOCOR) 100 MG tablet Take 1 tablet (100 mg) by mouth 2 times daily 180 tablet 3     lisinopril (ZESTRIL) 20 MG  tablet Take 1 tablet (20 mg) by mouth daily 90 tablet 3     metroNIDAZOLE (NORITATE) 1 % external cream Apply topically daily       [DISCONTINUED] alum & mag hydroxide-simethicone (MYLANTA ES/MAALOX  ES) 400-400-40 MG/5ML SUSP suspension Take 30 mLs by mouth every 4 hours as needed for indigestion (Patient not taking: Reported on 11/12/2019)       [DISCONTINUED] famotidine (PEPCID) 20 MG tablet Take 1 tablet (20 mg) by mouth 2 times daily 180 tablet 3     [DISCONTINUED] flecainide (TAMBOCOR) 100 MG tablet Take 1 tablet (100 mg) by mouth 2 times daily 180 tablet 0     [DISCONTINUED] lisinopril (ZESTRIL) 20 MG tablet Take 1 tablet (20 mg) by mouth daily 90 tablet 0     [DISCONTINUED] UNKNOWN TO PATIENT Topical used for rosacea - apply to face daily at bedtime.       No facility-administered encounter medications on file as of 1/6/2021.        Again, thank you for allowing me to participate in the care of your patient.      Sincerely,    Ramona Walters, NP, APRN [facility]

## 2021-01-07 NOTE — PROGRESS NOTES
Service Date: 01/06/2021      HISTORY OF PRESENT ILLNESS:  Mr. House is a delightful 75-year-old gentleman that I am having the pleasure of seeing again today in followup.  He is an established patient of Dr. Verma.  He has a past medical history that includes:   1.  Hypertension.   2.  Symptomatic paroxysmal atrial fibrillation for which he is on flecainide 100 mg b.i.d.  His CHADS-VASc score is 2 (hypertension and age).  He is on aspirin therapy.   3.  Questionable asthma or lung disease.  The patient has used Primatene mist in the past.  This is not a formal diagnosis.      At today's visit, Aman is doing well.  He denies chest pain, shortness of breath, lightheadedness or dizziness.  I do note that his blood pressure is elevated.  He is very anxious during our visit.  He states that his wife tells him he needs to calm his anxiety down.  His blood pressure was initially elevated and does come down to 158/80.  He is almost positive he took his medications today, which is lisinopril 20 mg daily.      He denies chest pain, shortness of breath, lightheadedness or dizziness.  Sometimes he states he gets congestion in his chest, but is able to cough up sputum.      A 12-lead EKG completed today shows a sinus rhythm with a QRS stable at 96 milliseconds.  There was 1 single PVC noted.  When compared to the previous EKG, no acute changes are noted.  All other review of systems and past medical history are noted below.      ASSESSMENT AND PLAN:  Mr. House is a delightful 75-year-old gentleman here today for followup.   1.  Symptomatic atrial fibrillation on flecainide 100 mg b.i.d.  His QRS is stable on EKG today.  He is on aspirin therapy.  CHADS-VASc score is 2.  Historically, he has had issues with atrial fibrillation after excessive alcohol use in the past.  He has not had any issues or concerns regarding this.   2.  Hypertension.  His blood pressure is elevated and was also noted to be elevated last year as well.  I have  encouraged him to follow up with Dr. Eller in 2-3 months.  I have also encouraged him to purchase a blood pressure cuff and monitor this.  He may need further up-titration of his lisinopril.  Echocardiogram last in  showed an EF of 55%-60% with grade I diastolic dysfunction.  He just had a trace of valvular insufficiency.  It should be noted, he also had a treadmill to rule out proarrhythmia of his flecainide, which was negative.      As always, I appreciate being involved in Aman's care.  Again, his elevated blood pressure could be because he did not take his lisinopril.  He was fairly certain he did, however.  He has not taken his Primatene mist for some time, so this should not be an issue.  He will consider getting a blood pressure cuff and let us know if his numbers are elevated.  Blood pressure goal is less than 140/80.      As always, thank you for including us in his care.  Feel free to contact us if you have questions or concerns regarding today's assessment and plan.         WERO PITTS NP             D: 2021   T: 2021   MT: MUKESH      Name:     ZOIE DEL VALLE   MRN:      [id number]        Account:      [id number]   :      1945           Service Date: 2021      Document: [id number]

## 2021-04-11 ENCOUNTER — ANCILLARY PROCEDURE (OUTPATIENT)
Dept: GENERAL RADIOLOGY | Facility: CLINIC | Age: 76
End: 2021-04-11
Attending: PHYSICIAN ASSISTANT
Payer: COMMERCIAL

## 2021-04-11 ENCOUNTER — OFFICE VISIT (OUTPATIENT)
Dept: URGENT CARE | Facility: URGENT CARE | Age: 76
End: 2021-04-11
Payer: COMMERCIAL

## 2021-04-11 VITALS
WEIGHT: 100 LBS | HEIGHT: 68 IN | BODY MASS INDEX: 15.16 KG/M2 | OXYGEN SATURATION: 94 % | HEART RATE: 88 BPM | DIASTOLIC BLOOD PRESSURE: 80 MMHG | RESPIRATION RATE: 16 BRPM | SYSTOLIC BLOOD PRESSURE: 133 MMHG | TEMPERATURE: 98.2 F

## 2021-04-11 DIAGNOSIS — Z20.822 SUSPECTED COVID-19 VIRUS INFECTION: ICD-10-CM

## 2021-04-11 DIAGNOSIS — J18.9 PNEUMONIA OF LEFT LOWER LOBE DUE TO INFECTIOUS ORGANISM: Primary | ICD-10-CM

## 2021-04-11 LAB
DEPRECATED S PYO AG THROAT QL EIA: NEGATIVE
SPECIMEN SOURCE: NORMAL

## 2021-04-11 PROCEDURE — 71046 X-RAY EXAM CHEST 2 VIEWS: CPT | Performed by: RADIOLOGY

## 2021-04-11 PROCEDURE — U0005 INFEC AGEN DETEC AMPLI PROBE: HCPCS | Performed by: PHYSICIAN ASSISTANT

## 2021-04-11 PROCEDURE — 99214 OFFICE O/P EST MOD 30 MIN: CPT | Performed by: PHYSICIAN ASSISTANT

## 2021-04-11 PROCEDURE — 87651 STREP A DNA AMP PROBE: CPT | Performed by: PHYSICIAN ASSISTANT

## 2021-04-11 PROCEDURE — U0003 INFECTIOUS AGENT DETECTION BY NUCLEIC ACID (DNA OR RNA); SEVERE ACUTE RESPIRATORY SYNDROME CORONAVIRUS 2 (SARS-COV-2) (CORONAVIRUS DISEASE [COVID-19]), AMPLIFIED PROBE TECHNIQUE, MAKING USE OF HIGH THROUGHPUT TECHNOLOGIES AS DESCRIBED BY CMS-2020-01-R: HCPCS | Performed by: PHYSICIAN ASSISTANT

## 2021-04-11 PROCEDURE — 99N1174 PR STATISTIC STREP A RAPID: Performed by: PHYSICIAN ASSISTANT

## 2021-04-11 RX ORDER — DOXYCYCLINE HYCLATE 100 MG
100 TABLET ORAL 2 TIMES DAILY
Qty: 20 TABLET | Refills: 0 | Status: SHIPPED | OUTPATIENT
Start: 2021-04-11 | End: 2021-04-21

## 2021-04-11 ASSESSMENT — MIFFLIN-ST. JEOR: SCORE: 1168.1

## 2021-04-11 NOTE — PATIENT INSTRUCTIONS
I suspect you have a viral illness.   I will call you if your strep test is positive.   Push fluids and rest.

## 2021-04-11 NOTE — PROGRESS NOTES
Assessment & Plan     1. Pneumonia of left lower lobe due to infectious organism  On exam, he looks appears malnourished, but is not in respiratory distress nor does he appear toxic.  His vital signs are stable.  Physical exam is unremarkable. Lungs are CTAB, no sign of respiratory distress. Throat without PTA or RPA and TM clear B/L. No nuchal rigidity or abd tenderness.   Chest x-ray shows lower lobe infiltrate, will treat for pneumonia.  Discussed worsening symptoms, or development of chest pain or shortness of breath he needs to be seen in the emergency department.  Patient agrees with treatment plan.  - doxycycline hyclate (VIBRA-TABS) 100 MG tablet; Take 1 tablet (100 mg) by mouth 2 times daily for 10 days  Dispense: 20 tablet; Refill: 0    2. Suspected COVID-19 virus infection  - Symptomatic COVID-19 Virus (Coronavirus) by PCR  - XR Chest 2 Views  - Streptococcus A Rapid Scr w Reflx to PCR  - Group A Streptococcus PCR Throat Swab      Return in about 3 days (around 4/14/2021), or if symptoms worsen or fail to improve.    Diagnosis and treatment plan was reviewed with patient and/or family.   Patient verbalizes understanding. All questions were addressed and answered.     Susan Fletcher PA-C  [facility]    CHIEF COMPLAINT:   Chief Complaint   Patient presents with     Urgent Care     Cough     chest congestion and coughing since last sunday      Marsha Alonso is a 75 year old male who presents to clinic today for evaluation cough.  Symptoms started 1 week ago, cough is productive of yellow phlegm.  Has not had fever or body aches.  Wife has similar symptoms.  Has not been around known Covid exposure.  Denies smoking.  He denies also having chest pain, pleurisy, dyspnea or weakness.      Past Medical History:   Diagnosis Date     Alcohol use      Allergic rhinitis, cause unspecified      Diverticulosis of colon     Diverticulitis 5/2010     Mild intermittent asthma       "Paroxysmal atrial fibrillation (H) 2016     PVC (premature ventricular contraction)     intermittent bigeminy     Rosacea      Scoliosis (and kyphoscoliosis), idiopathic      Past Surgical History:   Procedure Laterality Date     [facility] NONSPECIFIC PROCEDURE  1974    right inguinal herniorraphy     [facility] NONSPECIFIC PROCEDURE  age 16    L inguinal herniorraphy     Social History     Tobacco Use     Smoking status: Former Smoker     Packs/day: 1.00     Years: 23.00     Pack years: 23.00     Types: Cigarettes     Start date:      Quit date:      Years since quittin.2     Smokeless tobacco: Never Used   Substance Use Topics     Alcohol use: Yes     Alcohol/week: 0.0 standard drinks     Comment: 2 vodka drinks a day ... or more     Current Outpatient Medications   Medication     aspirin (ASA) 325 MG EC tablet     Ca Carbonate-Mag Hydroxide (ROLAIDS PO)     doxycycline hyclate (VIBRA-TABS) 100 MG tablet     EPINEPHrine (PRIMATENE MIST) 0.125 MG/ACT AERO     flecainide (TAMBOCOR) 100 MG tablet     lisinopril (ZESTRIL) 20 MG tablet     metroNIDAZOLE (NORITATE) 1 % external cream     No current facility-administered medications for this visit.      Allergies   Allergen Reactions     Penicillins        10 point ROS of systems were all negative except for pertinent positives noted in my HPI.      Exam:  /80   Pulse 88   Temp 98.2  F (36.8  C)   Resp 16   Ht 1.727 m (5' 8\")   Wt 45.4 kg (100 lb)   SpO2 94%   BMI 15.20 kg/m    Constitutional: alert and no distress. Body   Head: Normocephalic, atraumatic.  Eyes: conjunctiva clear, no drainage  ENT: TMs clear and shiny meseret, nasal mucosa pink and moist, throat without tonsillar hypertrophy or erythema  Neck: neck is supple, no cervical lymphadenopathy or nuchal rigidity  Cardiovascular: RRR  Respiratory: CTA bilaterally, no rhonchi or rales  Gastrointestinal: soft and nontender  Skin: no rashes  Neurologic: Speech clear, gait normal. Moves all " extremities.    Results for orders placed or performed in visit on 04/11/21   XR Chest 2 Views     Status: None    Narrative    CHEST TWO VIEWS April 11, 2021 5:09 PM     HISTORY: Cough. Suspected COVID-19 virus infection.    COMPARISON: Chest x-rays dated 11/1/2019.    FINDINGS: Subtle patchy opacity in the retrocardiac posterior left  lower lung lobe is new since the prior chest x-ray from 11/1/2019 and  could represent atelectasis, scarring or new infiltrate. The lungs are  mildly hyperaerated but are otherwise grossly clear. Densely calcified  lymph nodes in subcarinal region are again noted. Severe dextroconvex  rotoscoliosis of the thoracic spine is stable in appearance. No  pneumothorax, significant pleural fluid collection, acute osseous  fracture is seen.      Impression    IMPRESSION:   1. New infiltrate posterior left lower lung lobe since the prior  study. This could represent pneumonia.  2. Hyperaeration is again noted.  3. Severe rotoscoliosis of the thoracic spine is again seen.  4. Densely calcified subcarinal lymph nodes are again noted and  indicate chronic granulomatous disease.  5. No other significant abnormalities.    ALEX JAQUEZ MD   Streptococcus A Rapid Scr w Reflx to PCR     Status: None    Specimen: Throat   Result Value Ref Range    Strep Specimen Description Throat     Streptococcus Group A Rapid Screen Negative NEG^Negative

## 2021-04-12 ENCOUNTER — TELEPHONE (OUTPATIENT)
Dept: URGENT CARE | Facility: URGENT CARE | Age: 76
End: 2021-04-12

## 2021-04-12 LAB
LABORATORY COMMENT REPORT: ABNORMAL
SARS-COV-2 RNA RESP QL NAA+PROBE: NORMAL
SARS-COV-2 RNA RESP QL NAA+PROBE: POSITIVE
SPECIMEN SOURCE: ABNORMAL
SPECIMEN SOURCE: NORMAL
SPECIMEN SOURCE: NORMAL
STREP GROUP A PCR: NOT DETECTED

## 2021-04-12 NOTE — TELEPHONE ENCOUNTER
Coronavirus (COVID-19) Notification    Reason for call  Notify of POSITIVE  COVID-19 lab result, assess symptoms,  review [facility] recommendations    Lab Result   Lab test for 2019-nCoV rRt-PCR or SARS-COV-2 PCR  Oropharyngeal AND/OR nasopharyngeal swabs were POSITIVE for 2019-nCoV RNA [OR] SARS-COV-2 RNA (COVID-19) RNA     We have been unable to reach Patient by phone at this time to notify of their Positive COVID-19 result.  Left voicemail message requesting a call back to [phone] [facility] for results.        POSITIVE COVID-19 Letter sent.    Tania Zarate LPN

## 2021-04-13 NOTE — TELEPHONE ENCOUNTER
"  Pt notified via wife . All questions answered .  Discharge Instructions for COVID-19 Patients  You have--or may have--COVID-19. Please follow the instructions listed below.   If you have a weakened immune system, discuss with your doctor any other actions you need to take.  How can I protect others?  If you have symptoms (fever, cough, body aches or trouble breathing):    Stay home and away from others (self-isolate) until:  ? Your other symptoms have resolved (gotten better). And   ? You've had no fever--and no medicine that reduces fever--for 1 full day (24 hours). And   ? At least 10 days have passed since your symptoms started. (You may need to wait 20 days. Follow the advice of your care team.)  If you don't show symptoms, but testing showed that you have COVID-19:    Stay home and away from others (self-isolate) until at least 10 days have passed since the date of your first positive COVID-19 test.  During this time    Stay in your own room, even for meals. Use your own bathroom if you can.    Stay away from others in your home. No hugging, kissing or shaking hands. No visitors.    Don't go to work, school or anywhere else.    Clean \"high touch\" surfaces often (doorknobs, counters, handles). Use household cleaning spray or wipes.    You'll find a full list of  on the EPA website: www.epa.gov/pesticide-registration/list-n-disinfectants-use-against-sars-cov-2.    Cover your mouth and nose with a mask or other face covering to avoid spreading germs.    Wash your hands and face often. Use soap and water.    Caregivers in these groups are at risk for severe illness due to COVID-19:  ? People 65 years and older  ? People who live in a nursing home or long-term care facility  ? People with chronic disease (lung, heart, cancer, diabetes, kidney, liver, immunologic)  ? People who have a weakened immune system, including those who:    Are in cancer treatment    Take medicine that weakens the immune system, " such as corticosteroids    Had a bone marrow or organ transplant    Have an immune deficiency    Have poorly controlled HIV or AIDS    Are obese (body mass index of 40 or higher)    Smoke regularly    Caregivers should wear gloves while washing dishes, handling laundry and cleaning bedrooms and bathrooms.    Use caution when washing and drying laundry: Don't shake dirty laundry and use the warmest water setting that you can.    For more tips on managing your health at home, go to www.cdc.gov/coronavirus/2019-ncov/downloads/10Things.pdf.  How can I take care of myself at home?  1. Get lots of rest. Drink extra fluids (unless a doctor has told you not to).  2. Take Tylenol (acetaminophen) for fever or pain. If you have liver or kidney problems, ask your family doctor if it's okay to take Tylenol.   Adults can take either:   ? 650 mg (two 325 mg pills) every 4 to 6 hours, or   ? 1,000 mg (two 500 mg pills) every 8 hours as needed.  ? Note: Don't take more than 3,000 mg in one day. Acetaminophen is found in many medicines (both prescribed and over-the-counter medicines). Read all labels to be sure you don't take too much.   For children, check the Tylenol bottle for the right dose. The dose is based on the child's age or weight.  3. If you have other health problems (like cancer, heart failure, an organ transplant or severe kidney disease): Call your specialty clinic if you don't feel better in the next 2 days.  4. Know when to call 911. Emergency warning signs include:  ? Trouble breathing or shortness of breath  ? Pain or pressure in the chest that doesn't go away  ? Feeling confused like you haven't felt before, or not being able to wake up  ? Bluish-colored lips or face  5. Your doctor may have prescribed a blood thinner medicine. Follow their instructions.  Where can I get more information?     [facility] - About COVID-19:   [email or web address]    CDC - What to Do If You're Sick:  www.cdc.gov/coronavirus/2019-ncov/about/steps-when-sick.html    CDC - Ending Home Isolation: www.cdc.gov/coronavirus/2019-ncov/hcp/disposition-in-home-patients.html    CDC - Caring for Someone: www.cdc.gov/coronavirus/2019-ncov/if-you-are-sick/care-for-someone.html    [facility] - Interim Guidance for Hospital Discharge to Home: www.health.[facility].[place]./diseases/coronavirus/hcp/hospdischarge.pdf    Below are the COVID-19 hotlines at the [place] Department of Health ([facility]). Interpreters are available.  ? For health questions: Call 421-627-3822 or 1-109.914.2072 (7 a.m. to 7 p.m.)  ? For questions about schools and childcare: Call 013-833-5116 or 1-444.809.5265 (7 a.m. to 7 p.m.)    For informational purposes only. Not to replace the advice of your health care provider. Clinically reviewed by Dr. Nahum García.   Copyright   2020 [facility]. All rights reserved. LIA 449636 - REV 01/05/21.

## 2021-04-25 ENCOUNTER — ANCILLARY PROCEDURE (OUTPATIENT)
Dept: GENERAL RADIOLOGY | Facility: CLINIC | Age: 76
End: 2021-04-25
Attending: FAMILY MEDICINE
Payer: COMMERCIAL

## 2021-04-25 ENCOUNTER — OFFICE VISIT (OUTPATIENT)
Dept: URGENT CARE | Facility: URGENT CARE | Age: 76
End: 2021-04-25
Payer: COMMERCIAL

## 2021-04-25 VITALS
DIASTOLIC BLOOD PRESSURE: 79 MMHG | SYSTOLIC BLOOD PRESSURE: 107 MMHG | RESPIRATION RATE: 18 BRPM | TEMPERATURE: 96.4 F | OXYGEN SATURATION: 96 % | HEART RATE: 91 BPM

## 2021-04-25 DIAGNOSIS — J18.9 PNEUMONIA OF LEFT LOWER LOBE DUE TO INFECTIOUS ORGANISM: Primary | ICD-10-CM

## 2021-04-25 DIAGNOSIS — Z87.01 HISTORY OF RECENT PNEUMONIA: ICD-10-CM

## 2021-04-25 DIAGNOSIS — U07.1 INFECTION DUE TO 2019 NOVEL CORONAVIRUS: ICD-10-CM

## 2021-04-25 PROCEDURE — 71046 X-RAY EXAM CHEST 2 VIEWS: CPT | Performed by: RADIOLOGY

## 2021-04-25 PROCEDURE — 99214 OFFICE O/P EST MOD 30 MIN: CPT | Performed by: FAMILY MEDICINE

## 2021-04-25 RX ORDER — LEVOFLOXACIN 500 MG/1
500 TABLET, FILM COATED ORAL DAILY
Qty: 10 TABLET | Refills: 0 | Status: SHIPPED | OUTPATIENT
Start: 2021-04-25 | End: 2021-05-05

## 2021-04-25 NOTE — PROGRESS NOTES
SUBJECTIVE: Gavin House is a 75 year old male presenting with a chief complaint of cough  and SOB.  Onset of symptoms was week(s) ago.  Course of illness is improving.    Severity moderate  Current and Associated symptoms: none  Treatment measures tried include Doxycycline.  Predisposing factors include positive COVID 4-11.    Past Medical History:   Diagnosis Date     Alcohol use      Allergic rhinitis, cause unspecified      Diverticulosis of colon     Diverticulitis 2010     Mild intermittent asthma      Paroxysmal atrial fibrillation (H) 2016     PVC (premature ventricular contraction)     intermittent bigeminy     Rosacea      Scoliosis (and kyphoscoliosis), idiopathic      Allergies   Allergen Reactions     Penicillins      Social History     Tobacco Use     Smoking status: Former Smoker     Packs/day: 1.00     Years: 23.00     Pack years: 23.00     Types: Cigarettes     Start date:      Quit date:      Years since quittin.3     Smokeless tobacco: Never Used   Substance Use Topics     Alcohol use: Yes     Alcohol/week: 0.0 standard drinks     Comment: 2 vodka drinks a day ... or more       ROS:  SKIN: no rash  GI: no vomiting    OBJECTIVE:  /79   Pulse 91   Temp 96.4  F (35.8  C) (Oral)   Resp 18   SpO2 96% GENERAL APPEARANCE: healthy, alert and no distress  EYES: EOMI,  PERRL, conjunctiva clear  HENT: ear canals and TM's normal.  Nose and mouth without ulcers, erythema or lesions  NECK: supple, nontender, no lymphadenopathy  RESP: lungs clear to auscultation - no rales, rhonchi or wheezes  CV: regular rates and rhythm, normal S1 S2, no murmur noted  SKIN: no suspicious lesions or rashes    CXR with persistent infiltrate LLL      ICD-10-CM    1. Pneumonia of left lower lobe due to infectious organism  J18.9 levofloxacin (LEVAQUIN) 500 MG tablet   2. History of recent pneumonia  Z87.01 XR Chest 2 Views   3. Infection due to 2019 novel coronavirus  U07.1 XR Chest 2 Views      Quarantine  PPE used  No agp  Pt masked  ED if worse  Fluids/Rest, f/u if worse/not any better

## 2021-05-05 ENCOUNTER — VIRTUAL VISIT (OUTPATIENT)
Dept: INTERNAL MEDICINE | Facility: CLINIC | Age: 76
End: 2021-05-05
Payer: COMMERCIAL

## 2021-05-05 DIAGNOSIS — U07.1 INFECTION DUE TO 2019 NOVEL CORONAVIRUS: ICD-10-CM

## 2021-05-05 DIAGNOSIS — I10 ESSENTIAL HYPERTENSION: Primary | ICD-10-CM

## 2021-05-05 PROCEDURE — 99213 OFFICE O/P EST LOW 20 MIN: CPT | Mod: 95 | Performed by: INTERNAL MEDICINE

## 2021-05-05 NOTE — PROGRESS NOTES
"Gavin is a 75 year old who is being evaluated via a billable telephone visit.      What phone number would you like to be contacted at? [phone]  How would you like to obtain your AVS? Mail a copy    Assessment & Plan     Essential hypertension  Recheck chemistries when comes back for follow-up CXR  - Basic metabolic panel  (Ca, Cl, CO2, Creat, Gluc, K, Na, BUN); Future    Infection due to 2019 novel coronavirus  No need for Abx, suspect infiltrates due to COVID pneumonia and there's no clinical evidence of superimposed bacterial pneumonia.  Re-check CXR in another 2 weeks.  - XR Chest 2 Views; Future  - CBC with platelets; Future             See Patient Instructions    Return in about 2 weeks (around 5/19/2021) for Non-fasting Lab Only Visit and CXR.    Kash Eller MD  [facility]        Marsha Alonso is a 75 year old who presents for the following health issues     HPI     Pt is following up after having covid from 4/11/2021. Pt is feeling good no symptoms.       Was initially diagnosed with COVID on 4/11.  Had subsequent visit with UC on 4/25 with continued cough that was actuallyl improving as of second visit.  CXR showed multi-focal infiltrates, and he was given prescription for levaquin for \"pneumonia.\"  He has not taken that medication, and he continued to steadily feel better.      Review of Systems   Constitutional, HEENT, cardiovascular, pulmonary, GI, , musculoskeletal, neuro, skin, endocrine and psych systems are negative, except as otherwise noted.      Objective           Vitals:  No vitals were obtained today due to virtual visit.    Physical Exam   healthy, alert and no distress  PSYCH: Alert and oriented times 3; coherent speech, normal   rate and volume, able to articulate logical thoughts, able   to abstract reason, no tangential thoughts, no hallucinations   or delusions  His affect is normal  RESP: No cough, no audible wheezing, able to talk " in full sentences  Remainder of exam unable to be completed due to telephone visits                Phone call duration: 8 minutes

## 2021-05-17 ENCOUNTER — ANCILLARY PROCEDURE (OUTPATIENT)
Dept: GENERAL RADIOLOGY | Facility: CLINIC | Age: 76
End: 2021-05-17
Attending: INTERNAL MEDICINE
Payer: COMMERCIAL

## 2021-05-17 DIAGNOSIS — U07.1 INFECTION DUE TO 2019 NOVEL CORONAVIRUS: ICD-10-CM

## 2021-05-17 DIAGNOSIS — I10 ESSENTIAL HYPERTENSION: ICD-10-CM

## 2021-05-17 LAB
ANION GAP SERPL CALCULATED.3IONS-SCNC: 2 MMOL/L (ref 3–14)
BUN SERPL-MCNC: 10 MG/DL (ref 7–30)
CALCIUM SERPL-MCNC: 8.3 MG/DL (ref 8.5–10.1)
CHLORIDE SERPL-SCNC: 104 MMOL/L (ref 94–109)
CO2 SERPL-SCNC: 32 MMOL/L (ref 20–32)
CREAT SERPL-MCNC: 0.84 MG/DL (ref 0.66–1.25)
ERYTHROCYTE [DISTWIDTH] IN BLOOD BY AUTOMATED COUNT: 12.5 % (ref 10–15)
GFR SERPL CREATININE-BSD FRML MDRD: 86 ML/MIN/{1.73_M2}
GLUCOSE SERPL-MCNC: 97 MG/DL (ref 70–99)
HCT VFR BLD AUTO: 41.5 % (ref 40–53)
HGB BLD-MCNC: 13.5 G/DL (ref 13.3–17.7)
MCH RBC QN AUTO: 32.6 PG (ref 26.5–33)
MCHC RBC AUTO-ENTMCNC: 32.5 G/DL (ref 31.5–36.5)
MCV RBC AUTO: 100 FL (ref 78–100)
PLATELET # BLD AUTO: 241 10E9/L (ref 150–450)
POTASSIUM SERPL-SCNC: 4 MMOL/L (ref 3.4–5.3)
RBC # BLD AUTO: 4.14 10E12/L (ref 4.4–5.9)
SODIUM SERPL-SCNC: 140 MMOL/L (ref 133–144)
WBC # BLD AUTO: 4.6 10E9/L (ref 4–11)

## 2021-05-17 PROCEDURE — 80048 BASIC METABOLIC PNL TOTAL CA: CPT | Performed by: INTERNAL MEDICINE

## 2021-05-17 PROCEDURE — 71046 X-RAY EXAM CHEST 2 VIEWS: CPT | Performed by: RADIOLOGY

## 2021-05-17 PROCEDURE — 36415 COLL VENOUS BLD VENIPUNCTURE: CPT | Performed by: INTERNAL MEDICINE

## 2021-05-17 PROCEDURE — 85027 COMPLETE CBC AUTOMATED: CPT | Performed by: INTERNAL MEDICINE

## 2021-05-21 ENCOUNTER — IMMUNIZATION (OUTPATIENT)
Dept: NURSING | Facility: CLINIC | Age: 76
End: 2021-05-21
Payer: COMMERCIAL

## 2021-05-21 PROCEDURE — 91300 PR COVID VAC PFIZER DIL RECON 30 MCG/0.3 ML IM: CPT

## 2021-05-21 PROCEDURE — 0001A PR COVID VAC PFIZER DIL RECON 30 MCG/0.3 ML IM: CPT

## 2021-06-11 ENCOUNTER — IMMUNIZATION (OUTPATIENT)
Dept: NURSING | Facility: CLINIC | Age: 76
End: 2021-06-11
Attending: INTERNAL MEDICINE
Payer: COMMERCIAL

## 2021-06-11 PROCEDURE — 91300 PR COVID VAC PFIZER DIL RECON 30 MCG/0.3 ML IM: CPT

## 2021-06-11 PROCEDURE — 0002A PR COVID VAC PFIZER DIL RECON 30 MCG/0.3 ML IM: CPT

## 2021-07-15 ENCOUNTER — TELEPHONE (OUTPATIENT)
Dept: INTERNAL MEDICINE | Facility: CLINIC | Age: 76
End: 2021-07-15

## 2021-07-15 NOTE — LETTER
[facility]  [address] [address]  [phone]      8/5/2021 August 5, 2021      Gavin House  [address] [facility] [address]      Your healthcare team cares about your health. To provide you with the best care,   we have reviewed your chart and based on our findings, we see that you are due to:     - COLON CANCER SCREENING:  Call or mychart the clinic to schedule your colonoscopy or schedule/ your FIT Test, or Cologuard test  - ANNUAL WELLNESS FOLLOW UP:   Schedule an Annual Medicare Wellness Exam. This can be done by in person visit or virtual video visit.     If you have already completed these items, please contact the clinic via phone or   [facility] so your care team can review and update your records. Thank you for   choosing [facility] for your healthcare needs. For any questions,   concerns, or to schedule an appointment please contact the clinic.       Healthy Regards,      Your [facility] Care Team

## 2021-07-15 NOTE — LETTER
[facility]  [address] [address]  [phone]  July 15, 2021  Gavin House  [address] [facility] [address]    Dear Gavin,    We care about your health and based on a review of your medical records, recommend the the following, to better manage your health:      You are in particular need of attention regarding:  -Colon Cancer Screening  -Wellness (Physical) Visit     I am recommending that you:     -schedule a WELLNESS (Physical) APPOINTMENT with me.   I will check fasting labs the same day - nothing to eat except water and meds for 8-10 hours prior.    -schedule a COLONOSCOPY to look for colon cancer (due every 10 years or 5 years in higher risk situations.)        Colon cancer is now the second leading cause of cancer-related deaths in the [place] for both men and women and there are over 130,000 new cases and 50,000 deaths per year from colon cancer.  Colonoscopies can prevent 90-95% of these deaths.  Problem lesions can be removed before they ever become cancer.  This test is not only looking for cancer, but also getting rid of precancerious lesions.    If you are under/uninsured, we recommend you contact the [employer] program. [employer] is a free colorectal cancer screening program that provides colonoscopies for eligible under/uninsured [place] men and women. If you are interested in receiving a free colonoscopy, please call [employer] at [phone] (mention code [employer]) to see if you re eligible.      If you do not wish to do a colonoscopy or cannot afford to do one, at this time, there is another option. It is called a FIT test or Fecal Immunochemical Occult Blood Test (take home stool sample kit).  It does not replace the colonoscopy for colorectal cancer screening, but it can detect hidden bleeding in the lower colon.  It does need to be repeated every year and if a positive result is obtained, you  would be referred for a colonoscopy.          If you have completed either one of these tests at another facility, please call with the details of when and where the tests were done and if they were normal or not. Or have the records sent to our clinic so that we can best coordinate your care.      Here is a list of Health Maintenance topics that are due now or due soon:  Health Maintenance Due   Topic Date Due     Diptheria Tetanus Pertussis (DTAP/TDAP/TD) Vaccine (1 - Tdap) Never done     Zoster (Shingles) Vaccine (1 of 2) Never done     AORTIC ANEURYSM SCREENING (SYSTEM ASSIGNED)  Never done     Annual Wellness Visit  08/23/2018     FALL RISK ASSESSMENT  09/12/2019     Asthma Action Plan - yearly  10/08/2019     Asthma Control Test  05/01/2020     Colorectal Cancer Screening  09/10/2020     PHQ-2  01/01/2021       Please call us at [phone] or [phone] (or use [employer]) to address the above recommendations.     Thank you for trusting [facility].  We appreciate the opportunity to serve you and look forward to supporting your healthcare needs in the future.    If you have (or plan to have) any of these tests done at a facility other than a [facility] or a [facility], please have the results from these tests sent to your primary physician at [facility].    Healthy Regards,    Momo Eller MD/Gladis Vora [facility]

## 2021-07-15 NOTE — TELEPHONE ENCOUNTER
Patient Quality Outreach      Summary:    Patient Active Problem List   Diagnosis     Rosacea     Allergic rhinitis     Mild intermittent asthma     CARDIOVASCULAR SCREENING; LDL GOAL LESS THAN 160     Counseling on health care directive     Health Care Home     ACP (advance care planning)     Screening for diabetes mellitus     Paroxysmal A-fib (H)     Diverticulosis of colon     Scoliosis (and kyphoscoliosis), idiopathic     Essential hypertension     Atrial fib/flut w RVR (H)     Alcohol abuse     Hypoglycemia     Hypotension     Elevated troponin     Chest pain     PUD (peptic ulcer disease)     Leukocytosis     Right Acute pneumothorax         Patient is due/failing the following:   Colonoscopy and Annual wellness, date due: Now    Type of outreach:    Sent letter.    Questions for provider review:    None                                                                                                                                     Gladis Vora, CMA

## 2021-08-01 ENCOUNTER — HOSPITAL ENCOUNTER (EMERGENCY)
Facility: CLINIC | Age: 76
Discharge: HOME | End: 2021-08-01
Attending: EMERGENCY MEDICINE | Admitting: EMERGENCY MEDICINE
Payer: COMMERCIAL

## 2021-08-01 VITALS
HEART RATE: 93 BPM | DIASTOLIC BLOOD PRESSURE: 74 MMHG | OXYGEN SATURATION: 97 % | SYSTOLIC BLOOD PRESSURE: 169 MMHG | RESPIRATION RATE: 18 BRPM | TEMPERATURE: 97.9 F

## 2021-08-01 DIAGNOSIS — R33.9 URINARY RETENTION: ICD-10-CM

## 2021-08-01 LAB
ALBUMIN SERPL-MCNC: 4.1 G/DL (ref 3.4–5)
ALBUMIN UR-MCNC: NEGATIVE MG/DL
ALP SERPL-CCNC: 88 U/L (ref 40–150)
ALT SERPL W P-5'-P-CCNC: 16 U/L (ref 0–70)
ANION GAP SERPL CALCULATED.3IONS-SCNC: 10 MMOL/L (ref 3–14)
APPEARANCE UR: CLEAR
AST SERPL W P-5'-P-CCNC: 20 U/L (ref 0–45)
BASOPHILS # BLD AUTO: 0 10E3/UL (ref 0–0.2)
BASOPHILS NFR BLD AUTO: 0 %
BILIRUB SERPL-MCNC: 1.1 MG/DL (ref 0.2–1.3)
BILIRUB UR QL STRIP: NEGATIVE
BUN SERPL-MCNC: 13 MG/DL (ref 7–30)
CALCIUM SERPL-MCNC: 9.3 MG/DL (ref 8.5–10.1)
CHLORIDE BLD-SCNC: 100 MMOL/L (ref 94–109)
CO2 SERPL-SCNC: 25 MMOL/L (ref 20–32)
COLOR UR AUTO: ABNORMAL
CREAT SERPL-MCNC: 1.31 MG/DL (ref 0.66–1.25)
EOSINOPHIL # BLD AUTO: 0 10E3/UL (ref 0–0.7)
EOSINOPHIL NFR BLD AUTO: 0 %
ERYTHROCYTE [DISTWIDTH] IN BLOOD BY AUTOMATED COUNT: 11.9 % (ref 10–15)
GFR SERPL CREATININE-BSD FRML MDRD: 53 ML/MIN/1.73M2
GLUCOSE BLD-MCNC: 112 MG/DL (ref 70–99)
GLUCOSE UR STRIP-MCNC: NEGATIVE MG/DL
HCT VFR BLD AUTO: 50.1 % (ref 40–53)
HGB BLD-MCNC: 16.6 G/DL (ref 13.3–17.7)
HGB UR QL STRIP: ABNORMAL
HOLD SPECIMEN: NORMAL
IMM GRANULOCYTES # BLD: 0.1 10E3/UL
IMM GRANULOCYTES NFR BLD: 1 %
KETONES UR STRIP-MCNC: 40 MG/DL
LEUKOCYTE ESTERASE UR QL STRIP: NEGATIVE
LYMPHOCYTES # BLD AUTO: 0.5 10E3/UL (ref 0.8–5.3)
LYMPHOCYTES NFR BLD AUTO: 5 %
MCH RBC QN AUTO: 32.5 PG (ref 26.5–33)
MCHC RBC AUTO-ENTMCNC: 33.1 G/DL (ref 31.5–36.5)
MCV RBC AUTO: 98 FL (ref 78–100)
MONOCYTES # BLD AUTO: 1 10E3/UL (ref 0–1.3)
MONOCYTES NFR BLD AUTO: 10 %
MUCOUS THREADS #/AREA URNS LPF: PRESENT /LPF
NEUTROPHILS # BLD AUTO: 8.3 10E3/UL (ref 1.6–8.3)
NEUTROPHILS NFR BLD AUTO: 84 %
NITRATE UR QL: NEGATIVE
NRBC # BLD AUTO: 0 10E3/UL
NRBC BLD AUTO-RTO: 0 /100
PH UR STRIP: 5.5 [PH] (ref 5–7)
PLATELET # BLD AUTO: 244 10E3/UL (ref 150–450)
POTASSIUM BLD-SCNC: 3.7 MMOL/L (ref 3.4–5.3)
PROT SERPL-MCNC: 8.1 G/DL (ref 6.8–8.8)
RBC # BLD AUTO: 5.1 10E6/UL (ref 4.4–5.9)
RBC URINE: 2 /HPF
SODIUM SERPL-SCNC: 135 MMOL/L (ref 133–144)
SP GR UR STRIP: 1.01 (ref 1–1.03)
UROBILINOGEN UR STRIP-MCNC: NORMAL MG/DL
WBC # BLD AUTO: 9.8 10E3/UL (ref 4–11)
WBC URINE: 1 /HPF

## 2021-08-01 PROCEDURE — 99284 EMERGENCY DEPT VISIT MOD MDM: CPT | Mod: 25

## 2021-08-01 PROCEDURE — 51702 INSERT TEMP BLADDER CATH: CPT

## 2021-08-01 PROCEDURE — 85025 COMPLETE CBC W/AUTO DIFF WBC: CPT | Performed by: EMERGENCY MEDICINE

## 2021-08-01 PROCEDURE — 36415 COLL VENOUS BLD VENIPUNCTURE: CPT | Performed by: EMERGENCY MEDICINE

## 2021-08-01 PROCEDURE — 51798 US URINE CAPACITY MEASURE: CPT

## 2021-08-01 PROCEDURE — 81001 URINALYSIS AUTO W/SCOPE: CPT | Performed by: EMERGENCY MEDICINE

## 2021-08-01 PROCEDURE — 80053 COMPREHEN METABOLIC PANEL: CPT | Performed by: EMERGENCY MEDICINE

## 2021-08-01 ASSESSMENT — ENCOUNTER SYMPTOMS
DIAPHORESIS: 0
NAUSEA: 0
FEVER: 0
UNEXPECTED WEIGHT CHANGE: 0
DYSURIA: 0
VOMITING: 0
ABDOMINAL PAIN: 1
DIARRHEA: 0
BLOOD IN STOOL: 0
CHILLS: 0
BACK PAIN: 0

## 2021-08-01 NOTE — ED NOTES
"Patient's wife reports patient drinks vodka every day about 2 \"good size\" drinks. States hasn't had much alcohol in the past week due to abdominal discomfort. Patient denies history of withdrawals or seizures.  "

## 2021-08-01 NOTE — ED TRIAGE NOTES
"Pt reports \"big deal at [place]\" 2 days ago with ribs and alcohol. Pt reports lower abd pain.   "

## 2021-08-01 NOTE — ED NOTES
Patient accidently swallowed espresso  1hr , Sodium percarbonate, c/o burning to throat and metallic taste. Patient in with complaints of lower abdominal pain. Patient reports no control over bladder or bowel since Friday. States no BM for one week.    Patient accidently swallowed 1 big sip of espresso  1hr ago , Sodium percarbonate, c/o burning to throat and metallic taste.

## 2021-08-01 NOTE — ED NOTES
Patient fitted with leg bag. Patient and wife instructed on catheter use and care as well as informing them not to pull the catheter out on their own.

## 2021-08-01 NOTE — ED PROVIDER NOTES
History   Chief Complaint:  Abdominal Pain       HPI   Gavin House is a 75 year old male with history of alcohol abuse, paroxsymal atrial fibrillation, hypertension, and peptic ulcer disease who presents with abdominal pain. Per the patient, two days ago he developed waxing and waning lower abdominal pain. He cannot identify any exacerbating or alleviating factors. He has had some urinary incontinence issues as well. He denies diarrhea but had a smaller stool output. He last had a bowel movement was eight days ago, although this is not uncommon for her. He denies unexpected weight loss, hematuria, urinary frequency, urinary urgency, back pain, nausea, vomiting, dysuria, new blood in his stools, fever, chills, diaphoresis. He and his wife are COVID vaccinated. He also denies a personal history of bladder infections, enlarged prostate, and prostate cancer. His wife notes a pneumonia illness beginning about 4 months ago but this resolved about a month later. She also notes he has not been to his primary doctor recently. He also endorses alcohol use, but has not had as much recently.    Review of Systems   Constitutional: Negative for chills, diaphoresis, fever and unexpected weight change.   Gastrointestinal: Positive for abdominal pain. Negative for blood in stool, diarrhea, nausea and vomiting.   Genitourinary: Negative for dysuria.        Urinary incontinence   Musculoskeletal: Negative for back pain.   All other systems reviewed and are negative.      Allergies:  Penicillins    Medications:  Aspirin 325 MG  Flecainide  Lisinopril    Past Medical History:    Diverticulosis of colon  Mild intermittent asthma  Paroxsymal atrial fibrillation  Scoliosis and kyphoscoliosis, idiopathic    Peptic ulcer disease  Alcohol abuse  Hypertension    Past Surgical History:    Inguinal herniorrhaphy, bilateral    Family History:    Father: Colon cancer, alcoholism  Mother: Enlarged heart  Brother: CAD, alcoholism    Social  History:  The patient was accompanied to the ER by his wife  Alcohol Use: Positive  Marital Status:     Physical Exam     Patient Vitals for the past 24 hrs:   BP Temp Temp src Pulse Resp SpO2   08/01/21 1127 (!) 169/74 97.9  F (36.6  C) Temporal 93 18 97 %     Physical Exam  SKIN:  Warm, dry.  HEMATOLOGIC/IMMUNOLOGIC/LYMPHATIC:  No pallor.  EYES:  Conjunctivae normal.  CARDIOVASCULAR:  Regular rate and rhythm.  No murmur.  RESPIRATORY:  No respiratory distress, breath sounds equal and normal.  GASTROINTESTINAL:  Soft tender lower abdomen with active bowel sounds.  Suprapubic palpable tender mass.  MUSCULOSKELETAL: Normal body habitus.  NEUROLOGIC:  Alert, conversant.  PSYCHIATRIC:  Normal mood.    Emergency Department Course     Laboratory:    CBC: WBC 9.8, HGB 16.6,    CMP: Creatinine 1.31(H), Glucose 112(H), GFR Estimate 53(L) o/w WNL    UA with micro: Ketones 40(A), Blood Moderate(A), Mucous Present(A) o/w WNL    Emergency Department Course:    Reviewed:  I reviewed nursing notes, vitals and past medical history    Assessments:  1303 I obtained history and examined the patient as noted above.   1353 I rechecked the patient and explained findings.     Disposition:  The patient was discharged to home.       Impression & Plan     Medical Decision Making:  Gavin House is a 75 year old male who presents with pelvic pain and a palpable mass in the pelvis which was likely distended bladder. His bladder scan did reveal 815 mL of urine which was promptly drained by Moore catheterization and he felt great improvement. Testing above was relatively unrevealing. A leg bag was attached and I advised urology to follow-up. Given the urinary findings on testing, I did not think he was suffering a co-existing bladder infection.    Diagnosis:    ICD-10-CM    1. Urinary retention  R33.9        Scribe Disclosure:  I, Jason Poole, am serving as a scribe at 12:49 PM on 8/1/2021 to document services personally  performed by Andrey Hamilton MD based on my observations and the provider's statements to me.        Andrey Hamilton MD  08/01/21 9354

## 2021-08-02 ENCOUNTER — TELEPHONE (OUTPATIENT)
Dept: INTERNAL MEDICINE | Facility: CLINIC | Age: 76
End: 2021-08-02

## 2021-08-02 DIAGNOSIS — R33.9 URINARY RETENTION: Primary | ICD-10-CM

## 2021-08-02 NOTE — TELEPHONE ENCOUNTER
Leigha pt wife calling -CTC on file.     Pt seen in  ER yesterday 8-1-21 for urinary retention; referral was made to [facility]- wife states that they are no longer in service. Writer attempted to call Urologist referral given and it states number is no longer in service.     Pt needs referral to Urologist. Routing to PCP.     Please call pt wife back at  [phone] when referral is place.     Jaleesa Peterson RN

## 2021-08-02 NOTE — TELEPHONE ENCOUNTER
Referral placed to [facility] in [place] - they will contact him to schedule.

## 2021-08-05 NOTE — TELEPHONE ENCOUNTER
Patient Quality Outreach 2nd Attempt      Summary:    Type of outreach:    Sent letter.    Next Steps:  Reach out within 90 days via Phone.    Max number of attempts reached: Yes. Will try again in 90 days if patient still on fail list.    Questions for provider review:    None                                                                                                                    Gladis Vora, CMA

## 2021-08-11 ENCOUNTER — OFFICE VISIT (OUTPATIENT)
Dept: UROLOGY | Facility: CLINIC | Age: 76
End: 2021-08-11
Payer: COMMERCIAL

## 2021-08-11 VITALS
BODY MASS INDEX: 16.67 KG/M2 | OXYGEN SATURATION: 98 % | DIASTOLIC BLOOD PRESSURE: 70 MMHG | HEIGHT: 68 IN | HEART RATE: 77 BPM | SYSTOLIC BLOOD PRESSURE: 140 MMHG | WEIGHT: 110 LBS

## 2021-08-11 DIAGNOSIS — N40.1 BENIGN PROSTATIC HYPERPLASIA WITH URINARY RETENTION: ICD-10-CM

## 2021-08-11 DIAGNOSIS — R33.8 BENIGN PROSTATIC HYPERPLASIA WITH URINARY RETENTION: ICD-10-CM

## 2021-08-11 PROCEDURE — 99203 OFFICE O/P NEW LOW 30 MIN: CPT | Performed by: STUDENT IN AN ORGANIZED HEALTH CARE EDUCATION/TRAINING PROGRAM

## 2021-08-11 RX ORDER — TAMSULOSIN HYDROCHLORIDE 0.4 MG/1
0.4 CAPSULE ORAL DAILY
Qty: 90 CAPSULE | Refills: 3 | Status: SHIPPED | OUTPATIENT
Start: 2021-08-11 | End: 2022-08-31

## 2021-08-11 ASSESSMENT — MIFFLIN-ST. JEOR: SCORE: 1213.46

## 2021-08-11 ASSESSMENT — PAIN SCALES - GENERAL: PAINLEVEL: NO PAIN (0)

## 2021-08-11 NOTE — PATIENT INSTRUCTIONS
Start flomax  Trial of void void early next week  If high PVR >250 ml, would start intermittent self cath four times a day vs. Replace indwelling catheter, and refer for urodynamics    Schedule cystoscopy in 2-3 months      Patient Education     Cystoscopy    Cystoscopy is a procedure that lets your healthcare provider look directly inside your urethra and bladder. It can be used to:     Help diagnose a problem with your urethra, bladder, or kidneys.    Take a sample (biopsy) of bladder or urethral tissue.    Treat certain problems (such as removing kidney stones).    Place a tube (stent) to bypass a blockage.    Take special X-rays of the kidneys.  Based on the findings, your provider may advise other tests or treatments.   What is a cystoscope?  A cystoscope is a telescope-like tube that contains lenses and small glass wires that make bright light (fiberoptics). The cystoscope may be straight and rigid. Or it may be flexible to bend around curves in the urethra. The healthcare provider may look directly into the cystoscope, or project the image onto a screen.   Getting ready    Ask your healthcare provider if you should stop taking any medicines before the procedure.    Follow any directions you are given for not eating or drinking before the procedure.    Follow any other instructions your healthcare provider gives you.    Tell your healthcare provider before the exam if you:     Take any medicines, such as aspirin or blood thinners. This also includes any over-the-counter and prescription medicines, vitamins, herbs, and other supplements.    Have allergies to any medicines    Are pregnant or think you may be pregnant    During the procedure  Cystoscopy is done in the healthcare provider s office, surgery center, or hospital. The doctor and a nurse are present during the procedure. It takes only a few minutes. It takes longer if a biopsy, X-ray, or treatment needs to be done.   During the procedure:    You lie on  an exam table on your back, knees bent and legs apart. You are covered with a drape.    Your urethra and the area around it are washed. Anesthetic jelly may be applied to numb the urethra. Other pain medicine is often not needed. In some cases, you may be offered a mild sedative to help you relax. If a more extensive procedure is to be done, such as a biopsy or kidney stone removal, general anesthesia may be needed. Often a one-time antibiotic is given.    The cystoscope is inserted. A sterile fluid is put into the bladder to expand it. You may feel pressure from this fluid.    When the procedure is done, the cystoscope is removed.  After the procedure  If you had a sedative, general anesthesia, or spinal anesthesia, you must have someone drive you home. Once you re home:     Drink plenty of fluids.    You may have burning or light bleeding when you urinate. This is normal.    Medicines may be prescribed to ease any mild pain or prevent infection. Take these as directed.    Call your healthcare provider if you have heavy bleeding or blood clots, burning that lasts more than a day, a fever over  100  F  ( 38 C ), or trouble urinating.  [employer] last reviewed this educational content on 10/1/2019    7237-5932 [employer]. All rights reserved. This information is not intended as a substitute for professional medical care. Always follow your healthcare professional's instructions.

## 2021-08-11 NOTE — PROGRESS NOTES
"[facility]  Main Office: [address]  [address]  [place] [address]       CHIEF COMPLAINT:  Urinary retention    HISTORY:   76 yo M with h/o EtOH abuse, afib, HTN, PUD, presenting as ED followup for urinary retention. Presented 2021 with abdominal pain and urinary incontinence. Bladder scanned for 815 ml, Moore placed with relief of discomfort. He was not started on alpha blocker    Urination prior to catheterization he denies any issues    Has never had prostate or bladder surgery      PAST MEDICAL HISTORY:   Past Medical History:   Diagnosis Date     Alcohol use      Allergic rhinitis, cause unspecified      Diverticulosis of colon     Diverticulitis 2010     Mild intermittent asthma      Paroxysmal atrial fibrillation (H) 2016     PVC (premature ventricular contraction)     intermittent bigeminy     Rosacea      Scoliosis (and kyphoscoliosis), idiopathic        PAST SURGICAL HISTORY:   Past Surgical History:   Procedure Laterality Date     [facility] NONSPECIFIC PROCEDURE  1974    right inguinal herniorraphy     [facility] NONSPECIFIC PROCEDURE  age 16    L inguinal herniorraphy       FAMILY HISTORY:   Family History   Problem Relation Age of Onset     Colon Cancer Father          of this age 69     Alcoholism Father      Heart Disease Mother         \"enlarged heart\"; d: age 75     C.A.D. Brother          in his 80's      Alcoholism Brother          in his 60's        SOCIAL HISTORY:   Social History     Tobacco Use     Smoking status: Former Smoker     Packs/day: 1.00     Years: 23.00     Pack years: 23.00     Types: Cigarettes     Start date:      Quit date:      Years since quittin.6     Smokeless tobacco: Never Used   Substance Use Topics     Alcohol use: Yes     Alcohol/week: 0.0 standard drinks     Comment: 2 vodka drinks a day ... or more          Allergies   Allergen Reactions     Penicillins          Current Outpatient Medications:      aspirin (ASA) 325 MG EC " "tablet, Take 1 tablet (325 mg) by mouth daily, Disp: 100 tablet, Rfl: 3     Ca Carbonate-Mag Hydroxide (ROLAIDS PO), Take by mouth as needed, Disp: , Rfl:      EPINEPHrine (PRIMATENE MIST) 0.125 MG/ACT AERO, Inhale 1 puff into the lungs daily as needed, Disp: , Rfl:      flecainide (TAMBOCOR) 100 MG tablet, Take 1 tablet (100 mg) by mouth 2 times daily, Disp: 180 tablet, Rfl: 3     lisinopril (ZESTRIL) 20 MG tablet, Take 1 tablet (20 mg) by mouth daily, Disp: 90 tablet, Rfl: 3     metroNIDAZOLE (NORITATE) 1 % external cream, Apply topically daily, Disp: , Rfl:     Review Of Systems:  Skin: No rash, pruritis, or skin pigmentation  Eyes: No changes in vision  Ears/Nose/Throat: No changes in hearing, no nosebleeds  Respiratory: No shortness of breath, dyspnea on exertion, cough, or hemoptysis  Cardiovascular: No chest pain or palpitations  Gastrointestinal: No diarrhea or constipation. No abdominal pain. No hematochezia  Genitourinary: see HPI  Musculoskeletal: No pain or swelling of joints, normal range of motion  Neurologic: No weakness or tremors  Psychiatric: No recent changes in memory or mood  Hematologic/Lymphatic/Immunologic: No easy bruising or enlarged lymph nodes  Endocrine: No weight gain or loss      PHYSICAL EXAM:    BP (!) 140/70   Pulse 77   Ht 1.727 m (5' 8\")   Wt 49.9 kg (110 lb)   SpO2 98%   BMI 16.73 kg/m    General appearance: In NAD, conversant  HEENT: Normocephalic and atraumatic, anicteric sclera  Cardiovascular: Not examined  Respiratory: normal, non-labored breathing  Gastrointestinal: negative, Abdomen soft, non-tender, and non-distended.   Musculoskeletal: Not Examined  Peripheral Vascular/extremity: No peripheral edema  Skin: Normal temperature, turgor, and texture. No rash  Psychiatric: Appropriate affect, alert and oriented to person, place, and time    Penis: circ phallus with some edema ventrally, arrington in place clear yellow urine  Scrotal Skin: normal  Testicles: normal " bilat  Digital Rectal Exam: 50 gr prostate, soft, anodular    Cystoscopy: Not done      UA RESULTS:  Recent Labs   Lab Test 08/01/21  1335 10/02/18  1104   COLOR Light Yellow Yellow   APPEARANCE Clear Clear   URINEGLC Negative Negative   URINEBILI Negative Negative   URINEKETONE 40 * Negative   SG 1.010 1.020   UBLD Moderate* Negative   URINEPH 5.5 6.0   PROTEIN Negative Negative   UROBILINOGEN  --  0.2   NITRITE Negative Negative   LEUKEST Negative Negative   RBCU 2 O - 2   WBCU 1 0 - 5       Bladder Scan:     Other Labs:      Imaging Studies: None      CLINICAL IMPRESSION:   74 yo M with h/o EtOH abuse, afib, HTN, PUD, presenting as ED followup for urinary retention (815 ml in bladder). Has Moore in place since 8/l1/2021, was not started on alpha blocker. Likely BPH with retention. Will do trial of void after starting alpha blocker    PLAN:   Start flomax  Trial of void void early next week  If high PVR >250 ml, would start intermittent self cath four times a day vs. Replace indwelling catheter, and refer for urodynamics    Schedule cystoscopy in 2-3 months, with PSA prior, AUA symptom score, UA, PVR      Cameron Kowalski MD   [facility]  [phone] clinic phone

## 2021-08-11 NOTE — LETTER
"2021       RE: Gavin House  [address]  [place] [id number]     Dear Colleague,    Thank you for referring your patient, Gavin House, to the [facility] at [facility]. Please see a copy of my visit note below.    [facility]  Main Office: [address]  [address]  [place] [address]       CHIEF COMPLAINT:  Urinary retention    HISTORY:   74 yo M with h/o EtOH abuse, afib, HTN, PUD, presenting as ED followup for urinary retention. Presented 2021 with abdominal pain and urinary incontinence. Bladder scanned for 815 ml, Moore placed with relief of discomfort. He was not started on alpha blocker    Urination prior to catheterization he denies any issues    Has never had prostate or bladder surgery      PAST MEDICAL HISTORY:   Past Medical History:   Diagnosis Date     Alcohol use      Allergic rhinitis, cause unspecified      Diverticulosis of colon     Diverticulitis 2010     Mild intermittent asthma      Paroxysmal atrial fibrillation (H) 2016     PVC (premature ventricular contraction)     intermittent bigeminy     Rosacea      Scoliosis (and kyphoscoliosis), idiopathic        PAST SURGICAL HISTORY:   Past Surgical History:   Procedure Laterality Date     [facility] NONSPECIFIC PROCEDURE  1974    right inguinal herniorraphy     [facility] NONSPECIFIC PROCEDURE  age 16    L inguinal herniorraphy       FAMILY HISTORY:   Family History   Problem Relation Age of Onset     Colon Cancer Father          of this age 69     Alcoholism Father      Heart Disease Mother         \"enlarged heart\"; d: age 75     C.A.D. Brother          in his 80's      Alcoholism Brother          in his 60's        SOCIAL HISTORY:   Social History     Tobacco Use     Smoking status: Former Smoker     Packs/day: 1.00     Years: 23.00     Pack years: 23.00     Types: Cigarettes     Start date:      Quit date:      Years since " "quittin.6     Smokeless tobacco: Never Used   Substance Use Topics     Alcohol use: Yes     Alcohol/week: 0.0 standard drinks     Comment: 2 vodka drinks a day ... or more          Allergies   Allergen Reactions     Penicillins          Current Outpatient Medications:      aspirin (ASA) 325 MG EC tablet, Take 1 tablet (325 mg) by mouth daily, Disp: 100 tablet, Rfl: 3     Ca Carbonate-Mag Hydroxide (ROLAIDS PO), Take by mouth as needed, Disp: , Rfl:      EPINEPHrine (PRIMATENE MIST) 0.125 MG/ACT AERO, Inhale 1 puff into the lungs daily as needed, Disp: , Rfl:      flecainide (TAMBOCOR) 100 MG tablet, Take 1 tablet (100 mg) by mouth 2 times daily, Disp: 180 tablet, Rfl: 3     lisinopril (ZESTRIL) 20 MG tablet, Take 1 tablet (20 mg) by mouth daily, Disp: 90 tablet, Rfl: 3     metroNIDAZOLE (NORITATE) 1 % external cream, Apply topically daily, Disp: , Rfl:     Review Of Systems:  Skin: No rash, pruritis, or skin pigmentation  Eyes: No changes in vision  Ears/Nose/Throat: No changes in hearing, no nosebleeds  Respiratory: No shortness of breath, dyspnea on exertion, cough, or hemoptysis  Cardiovascular: No chest pain or palpitations  Gastrointestinal: No diarrhea or constipation. No abdominal pain. No hematochezia  Genitourinary: see HPI  Musculoskeletal: No pain or swelling of joints, normal range of motion  Neurologic: No weakness or tremors  Psychiatric: No recent changes in memory or mood  Hematologic/Lymphatic/Immunologic: No easy bruising or enlarged lymph nodes  Endocrine: No weight gain or loss      PHYSICAL EXAM:    BP (!) 140/70   Pulse 77   Ht 1.727 m (5' 8\")   Wt 49.9 kg (110 lb)   SpO2 98%   BMI 16.73 kg/m    General appearance: In NAD, conversant  HEENT: Normocephalic and atraumatic, anicteric sclera  Cardiovascular: Not examined  Respiratory: normal, non-labored breathing  Gastrointestinal: negative, Abdomen soft, non-tender, and non-distended.   Musculoskeletal: Not Examined  Peripheral " Vascular/extremity: No peripheral edema  Skin: Normal temperature, turgor, and texture. No rash  Psychiatric: Appropriate affect, alert and oriented to person, place, and time    Penis: circ phallus with some edema ventrally, arrington in place clear yellow urine  Scrotal Skin: normal  Testicles: normal bilat  Digital Rectal Exam: 50 gr prostate, soft, anodular    Cystoscopy: Not done      UA RESULTS:  Recent Labs   Lab Test 08/01/21  1335 10/02/18  1104   COLOR Light Yellow Yellow   APPEARANCE Clear Clear   URINEGLC Negative Negative   URINEBILI Negative Negative   URINEKETONE 40 * Negative   SG 1.010 1.020   UBLD Moderate* Negative   URINEPH 5.5 6.0   PROTEIN Negative Negative   UROBILINOGEN  --  0.2   NITRITE Negative Negative   LEUKEST Negative Negative   RBCU 2 O - 2   WBCU 1 0 - 5       Bladder Scan:     Other Labs:      Imaging Studies: None      CLINICAL IMPRESSION:   76 yo M with h/o EtOH abuse, afib, HTN, PUD, presenting as ED followup for urinary retention (815 ml in bladder). Has Arrington in place since 8/l1/2021, was not started on alpha blocker. Likely BPH with retention. Will do trial of void after starting alpha blocker    PLAN:   Start flomax  Trial of void void early next week  If high PVR >250 ml, would start intermittent self cath four times a day vs. Replace indwelling catheter, and refer for urodynamics    Schedule cystoscopy in 2-3 months, with PSA prior, AUA symptom score, UA, PVR      Cameron Kowalski MD   [facility]  [phone] clinic phone

## 2021-08-17 ENCOUNTER — ALLIED HEALTH/NURSE VISIT (OUTPATIENT)
Dept: UROLOGY | Facility: CLINIC | Age: 76
End: 2021-08-17
Payer: COMMERCIAL

## 2021-08-17 DIAGNOSIS — Z79.2 PROPHYLACTIC ANTIBIOTIC: Primary | ICD-10-CM

## 2021-08-17 PROCEDURE — 51700 IRRIGATION OF BLADDER: CPT

## 2021-08-17 RX ORDER — CIPROFLOXACIN 500 MG/1
500 TABLET, FILM COATED ORAL ONCE
Qty: 1 TABLET | Refills: 0 | Status: SHIPPED | OUTPATIENT
Start: 2021-08-17 | End: 2021-08-17

## 2021-08-17 NOTE — PROGRESS NOTES
Gavin House comes into clinic today at the request of Dr. Kowalski Ordering Provider for TOV (trial of void).    Patient presents to clinic for a trial of void per MD order. Patient properly identified and procedure explained to patient. Patient's leg-bag emptied, 100cc's clear-yellow urine drained from patient's catheter.Catheter detached from leg-bag and sterile cysto tubing inserted into catheter opening. Via gravity 325cc's sterile water was gently instilled with brief pauses into bladder. Patient tolerated fairly well and then sterile water was removed from balloon. Moore catheter was removed from bladder. Patient was able to successfully void 225ml following procedure. Patient was instructed to drink plenty of water, (At least 6-8 glasses daily). Patient instructed to call office during daytime hours, otherwise go to ER if unable to urinate/difficulty emptying. Patient verbailized understanding of this and will follow-up with MD as planned. Patient instructed to take one antibiotic today.       This service provided today was under the supervising provider of the day Dr. Muniz, who was available if needed.    Lalita Viveros LPN

## 2021-08-17 NOTE — PATIENT INSTRUCTIONS
Today, you have successfully passed a trial of void in office. This means you are going home without the catheter in place. Per MD you are instructed to drink plenty of water. If unable to urinate during office hours Mon-Friday 8am-400pm you may call the office at [phone]. During non-office hours you are instructed to go to ER. You may need to have a catheter replaced.     Thanks!

## 2021-09-27 ENCOUNTER — OFFICE VISIT (OUTPATIENT)
Dept: INTERNAL MEDICINE | Facility: CLINIC | Age: 76
End: 2021-09-27
Payer: COMMERCIAL

## 2021-09-27 VITALS
SYSTOLIC BLOOD PRESSURE: 120 MMHG | RESPIRATION RATE: 16 BRPM | BODY MASS INDEX: 16.56 KG/M2 | TEMPERATURE: 98.2 F | HEART RATE: 83 BPM | OXYGEN SATURATION: 99 % | DIASTOLIC BLOOD PRESSURE: 60 MMHG | WEIGHT: 108.9 LBS

## 2021-09-27 DIAGNOSIS — Z12.11 SCREEN FOR COLON CANCER: ICD-10-CM

## 2021-09-27 DIAGNOSIS — Z00.00 MEDICARE ANNUAL WELLNESS VISIT, SUBSEQUENT: Primary | ICD-10-CM

## 2021-09-27 DIAGNOSIS — Z13.6 CARDIOVASCULAR SCREENING; LDL GOAL LESS THAN 160: ICD-10-CM

## 2021-09-27 DIAGNOSIS — R41.3 MEMORY PROBLEM: ICD-10-CM

## 2021-09-27 PROCEDURE — 99397 PER PM REEVAL EST PAT 65+ YR: CPT | Performed by: INTERNAL MEDICINE

## 2021-09-27 PROCEDURE — 99214 OFFICE O/P EST MOD 30 MIN: CPT | Mod: 25 | Performed by: INTERNAL MEDICINE

## 2021-09-27 NOTE — PROGRESS NOTES
"  SUBJECTIVE:   Gavin House is a 75 year old male who presents for Preventive Visit.      Patient has been advised of split billing requirements and indicates understanding: Yes  Are you in the first 12 months of your Medicare Part B coverage?  No    Physical Health:    In general, how would you rate your overall physical health? Excellent to good    Outside of work, how many days during the week do you exercise? none- chases the dogs everyday    Outside of work, approximately how many minutes a day do you exercise?not applicable    If you drink alcohol do you typically have >3 drinks per day or >7 drinks per week? No    Do you usually eat at least 4 servings of fruit and vegetables a day, include whole grains & fiber and avoid regularly eating high fat or \"junk\" foods? NO    Do you have any problems taking medications regularly?  No    Do you have any side effects from medications? none    Needs assistance for the following daily activities: no assistance needed    Which of the following safety concerns are present in your home?  none identified     Hearing impairment: No but, might be plugged    In the past 6 months, have you been bothered by leaking of urine? no    Mental Health:    In general, how would you rate your overall mental or emotional health? good  PHQ-2 Score:      Do you feel safe in your environment? Yes    Have you ever done Advance Care Planning? (For example, a Health Directive, POLST, or a discussion with a medical provider or your loved ones about your wishes): No, advance care planning information given to patient to review.  Patient declined advance care planning discussion at this time.    Additional concerns to address?  No    Fall risk:  Fallen 2 or more times in the past year?: No  Any fall with injury in the past year?: No    Cognitive Screenin) Repeat 3 items (Leader, Season, Table)    2) Clock draw: ABNORMAL   3) 3 item recall: Recalls NO objects   Results: 0 items recalled: " PROBABLE COGNITIVE IMPAIRMENT, **INFORM PROVIDER**    Mini-CogTM Copyright XU [employer]. Licensed by the author for use in [facility]; reprinted with permission (yarelis@[facility]). All rights reserved.      Do you have sleep apnea, excessive snoring or daytime drowsiness?: no            Reviewed and updated as needed this visit by clinical staff  Tobacco  Allergies  Meds              Reviewed and updated as needed this visit by Provider                Social History     Tobacco Use     Smoking status: Former Smoker     Packs/day: 1.00     Years: 23.00     Pack years: 23.00     Types: Cigarettes     Start date:      Quit date:      Years since quittin.7     Smokeless tobacco: Never Used   Substance Use Topics     Alcohol use: Yes     Alcohol/week: 0.0 standard drinks     Comment: 2 vodka drinks a day ... or more                           Current providers sharing in care for this patient include:   Patient Care Team:  Kash Eller MD as PCP - General  Kash Eller MD as Assigned PCP  Ramona Walters APRN CNP as Assigned Heart and Vascular Provider  Cameron Kowalski MD as Assigned Surgical Provider    The following health maintenance items are reviewed in Epic and correct as of today:  Health Maintenance   Topic Date Due     DTAP/TDAP/TD IMMUNIZATION (1 - Tdap) Never done     ZOSTER IMMUNIZATION (1 of 2) Never done     AORTIC ANEURYSM SCREENING (SYSTEM ASSIGNED)  Never done     FALL RISK ASSESSMENT  2019     ASTHMA ACTION PLAN  10/08/2019     ASTHMA CONTROL TEST  2020     COLORECTAL CANCER SCREENING  09/10/2020     INFLUENZA VACCINE (1) 2021     ANNUAL REVIEW OF HM ORDERS  2022     MEDICARE ANNUAL WELLNESS VISIT  2022     LIPID  10/02/2023     ADVANCE CARE PLANNING  2026     HEPATITIS C SCREENING  Completed     PHQ-2  Completed     Pneumococcal Vaccine: 65+ Years  Completed     COVID-19 Vaccine  Completed     IPV IMMUNIZATION  Aged Out      "MENINGITIS IMMUNIZATION  Aged Out     HEPATITIS B IMMUNIZATION  Aged Out         Patient obviously having short-term memory problems.  He could not complete today's Mini-Cog screen and 6-CIT results are shown below.  Patient pleasantly downplays this, states that his memory has \"always\" been bad.  There do not seem to be any recent issues with falling, leaving stove on, etc., but he has always had attentive family around him.    He is in the midst of treatment for BPH, currently on Flomax therapy after having required intermittent catheterization earlier this year.  He is losing weight.        ROS:  Constitutional, HEENT, cardiovascular, pulmonary, GI, , musculoskeletal, neuro, skin, endocrine and psych systems are negative, except as otherwise noted.    OBJECTIVE:   /60 (BP Location: Left arm, Patient Position: Chair, Cuff Size: Adult Regular)   Pulse 83   Temp 98.2  F (36.8  C) (Oral)   Resp 16   Wt 49.4 kg (108 lb 14.4 oz)   SpO2 99%   BMI 16.56 kg/m   Estimated body mass index is 16.56 kg/m  as calculated from the following:    Height as of 8/11/21: 1.727 m (5' 8\").    Weight as of this encounter: 49.4 kg (108 lb 14.4 oz).  EXAM:   GENERAL: no distress  NECK: no adenopathy, no asymmetry, masses, or scars and thyroid normal to palpation  RESP: lungs clear to auscultation - no rales, rhonchi or wheezes  CV: regular rate and rhythm, normal S1 S2, no S3 or S4, no murmur, click or rub, no peripheral edema and peripheral pulses strong  ABDOMEN: soft, nontender, no hepatosplenomegaly, no masses and bowel sounds normal  MS: no gross musculoskeletal defects noted, no edema    Six Item Cognitive Impairment Test   (6CIT):      What year is it?                               Incorrect - 4 points    What month is it?                               Correct - 0 points      Give the patient an address to remember with five components:   Luis [place] ( first and last name - 2 components)   [address]  (number and " name of street - 2 components)   [place] ( city - 1 component)      About what time is it (within the hour)? Incorrect - 3 points    Count backwards from 20 to 1:   Correct - 0 points    Say the months of the year in reverse: More than one error - 4 points    Repeat the address phrase:   4 errors - 8 points    Total 6CIT Score:      15/28    Interpretation: The 6CIT uses an inverse score and questions are weighted to produce a total out of 28. Scores of 0-7 are considered normal and 8 or more significant.    Advantages The test has high sensitivity without compromising specificity even in mild dementia. It is easy to translate linguistically and culturally.  Disadvantages The main disadvantage is in the scoring and weighting of the test, which is initially confusing, however computer models have simplified this greatly.    Probability Statistics: At the 7/8 cut off: Overall figures sensitivity 90% specificity 100%, in mild dementia sensitivity = 78% , specificity = 100%    Copyright 2000 [facility], [facility]. Courtesy of Dr. Aamir Van      ASSESSMENT / PLAN:   Medicare annual wellness visit, subsequent      Screen for colon cancer  Consents to follow-up colonoscopy  - Adult Gastro Ref - Procedure Only; Future    Memory problem  Obviously significant.  Check labs as ordered along with routine surveillance labs, when able to return fasting.  Brain MRI.  Very likely neurology evaluation in near future.  Discussed concerns with patient's wife by phone today (CTC on file).  - Comprehensive metabolic panel; Future  - TSH with free T4 reflex; Future  - CBC with platelets; Future  - Vitamin B12; Future  - Folate; Future  - MR Brain w/o Contrast; Future    CARDIOVASCULAR SCREENING; LDL GOAL LESS THAN 160    - Lipid panel reflex to direct LDL Fasting; Future      Patient has been advised of split billing requirements and indicates understanding: Yes    COUNSELING:  Reviewed preventive health  "counseling, as reflected in patient instructions    Estimated body mass index is 16.56 kg/m  as calculated from the following:    Height as of 8/11/21: 1.727 m (5' 8\").    Weight as of this encounter: 49.4 kg (108 lb 14.4 oz).        He reports that he quit smoking about 31 years ago. His smoking use included cigarettes. He started smoking about 54 years ago. He has a 23.00 pack-year smoking history. He has never used smokeless tobacco.    Appropriate preventive services were discussed with this patient, including applicable screening as appropriate for cardiovascular disease, diabetes, osteopenia/osteoporosis, and glaucoma.  As appropriate for age/gender, discussed screening for colorectal cancer, prostate cancer, breast cancer, and cervical cancer. Checklist reviewing preventive services available has been given to the patient.    Reviewed patients plan of care and provided an AVS. The Basic Care Plan (routine screening as documented in Health Maintenance) for Gavin meets the Care Plan requirement. This Care Plan has been established and reviewed with the Patient.    Counseling Resources:  ATP IV Guidelines  Pooled Cohorts Equation Calculator  Breast Cancer Risk Calculator  BRCA-Related Cancer Risk Assessment: FHS-7 Tool  FRAX Risk Assessment  ICSI Preventive Guidelines  Dietary Guidelines for Americans, 2010  USDA's MyPlate  ASA Prophylaxis  Lung CA Screening    Kash Eller MD  [facility]        "

## 2021-09-27 NOTE — PATIENT INSTRUCTIONS
- Please come in for fasting labs in the next few weeks.  I will be in touch with you when I receive the results.      - I'm concerned about your memory - you didn't do very well on the memory testing we did today.  - I have ordered an MRI of your brain - the radiology schedulers will contact you in the next few days to schedule this.  - I will also be getting some specific blood tests for this, when you return for blood work in the next few days.    - It's very possible we will have you see a neurologist in the near future.      - [facility] will contact you to schedule your colonoscopy.

## 2021-10-08 ENCOUNTER — HOSPITAL ENCOUNTER (OUTPATIENT)
Dept: MRI IMAGING | Facility: CLINIC | Age: 76
Discharge: HOME | End: 2021-10-08
Attending: INTERNAL MEDICINE | Admitting: INTERNAL MEDICINE
Payer: COMMERCIAL

## 2021-10-08 DIAGNOSIS — R41.3 MEMORY PROBLEM: ICD-10-CM

## 2021-10-08 PROCEDURE — 70551 MRI BRAIN STEM W/O DYE: CPT

## 2021-10-15 ENCOUNTER — TELEPHONE (OUTPATIENT)
Dept: INTERNAL MEDICINE | Facility: CLINIC | Age: 76
End: 2021-10-15

## 2021-10-15 DIAGNOSIS — R41.3 MEMORY LOSS: Primary | ICD-10-CM

## 2021-10-15 NOTE — TELEPHONE ENCOUNTER
Pt's wife called asking for call regarding MRI results from last Friday     Please review and advise on recent results    Merry SHELDON RN

## 2021-10-18 NOTE — TELEPHONE ENCOUNTER
There was nothing acute seen on the MRI - evidence of age related atrophy and possible evidence of previous strokes.    I would suggest following up with a neurologist, as we had discussed at his appointment.  Referral placed.

## 2021-10-25 ENCOUNTER — OFFICE VISIT (OUTPATIENT)
Dept: UROLOGY | Facility: CLINIC | Age: 76
End: 2021-10-25
Payer: COMMERCIAL

## 2021-10-25 VITALS
BODY MASS INDEX: 16.37 KG/M2 | DIASTOLIC BLOOD PRESSURE: 70 MMHG | HEART RATE: 73 BPM | WEIGHT: 108 LBS | OXYGEN SATURATION: 97 % | HEIGHT: 68 IN | SYSTOLIC BLOOD PRESSURE: 130 MMHG

## 2021-10-25 DIAGNOSIS — N40.1 BENIGN PROSTATIC HYPERPLASIA WITH URINARY RETENTION: Primary | ICD-10-CM

## 2021-10-25 DIAGNOSIS — R33.8 BENIGN PROSTATIC HYPERPLASIA WITH URINARY RETENTION: Primary | ICD-10-CM

## 2021-10-25 LAB
ALBUMIN UR-MCNC: NEGATIVE MG/DL
APPEARANCE UR: CLEAR
BILIRUB UR QL STRIP: NEGATIVE
COLOR UR AUTO: YELLOW
GLUCOSE UR STRIP-MCNC: NEGATIVE MG/DL
HGB UR QL STRIP: NEGATIVE
KETONES UR STRIP-MCNC: NEGATIVE MG/DL
LEUKOCYTE ESTERASE UR QL STRIP: NEGATIVE
NITRATE UR QL: NEGATIVE
PH UR STRIP: 6 [PH] (ref 5–7)
PSA SERPL-MCNC: 2.3 UG/L (ref 0–4)
RESIDUAL VOLUME (RV) (EXTERNAL): 2
SP GR UR STRIP: >=1.03 (ref 1–1.03)
UROBILINOGEN UR STRIP-ACNC: 0.2 E.U./DL

## 2021-10-25 PROCEDURE — 52000 CYSTOURETHROSCOPY: CPT | Performed by: STUDENT IN AN ORGANIZED HEALTH CARE EDUCATION/TRAINING PROGRAM

## 2021-10-25 PROCEDURE — 81003 URINALYSIS AUTO W/O SCOPE: CPT | Mod: QW | Performed by: STUDENT IN AN ORGANIZED HEALTH CARE EDUCATION/TRAINING PROGRAM

## 2021-10-25 PROCEDURE — 84153 ASSAY OF PSA TOTAL: CPT | Performed by: STUDENT IN AN ORGANIZED HEALTH CARE EDUCATION/TRAINING PROGRAM

## 2021-10-25 PROCEDURE — 51798 US URINE CAPACITY MEASURE: CPT | Performed by: STUDENT IN AN ORGANIZED HEALTH CARE EDUCATION/TRAINING PROGRAM

## 2021-10-25 PROCEDURE — 36415 COLL VENOUS BLD VENIPUNCTURE: CPT | Performed by: STUDENT IN AN ORGANIZED HEALTH CARE EDUCATION/TRAINING PROGRAM

## 2021-10-25 PROCEDURE — 99213 OFFICE O/P EST LOW 20 MIN: CPT | Mod: 25 | Performed by: STUDENT IN AN ORGANIZED HEALTH CARE EDUCATION/TRAINING PROGRAM

## 2021-10-25 RX ORDER — LIDOCAINE HYDROCHLORIDE 20 MG/ML
JELLY TOPICAL ONCE
Status: COMPLETED | OUTPATIENT
Start: 2021-10-25 | End: 2021-10-25

## 2021-10-25 RX ADMIN — LIDOCAINE HYDROCHLORIDE 5 ML: 20 JELLY TOPICAL at 14:00

## 2021-10-25 ASSESSMENT — PAIN SCALES - GENERAL: PAINLEVEL: NO PAIN (0)

## 2021-10-25 ASSESSMENT — MIFFLIN-ST. JEOR: SCORE: 1204.38

## 2021-10-25 NOTE — NURSING NOTE
Chief Complaint   Patient presents with     Benign Prostatic Hypertrophy     in office cystoscopy today     Urinary Retention   Prior to the start of the procedure and with procedural staff participation, I verbally confirmed the patient s identity using two indicators, relevant allergies, that the procedure was appropriate and matched the consent or emergent situation, and that the correct equipment/implants were available. Immediately prior to starting the procedure I conducted the Time Out with the procedural staff and re-confirmed the patient s name, procedure, and site/side. I have wiped the patient off with the povidone-Iodine solution, draped them,  used Lidocaine hydrochloride jelly, and instilled sterile water into the bladder. (The Joint Commission universal protocol was followed.)  Yes    Sedation (Moderate or Deep): None  5mL 2% lidocaine hydrochloride Urojet instilled into urethra.    NDC# [address]-3  Lot #: [id number]  Expiration Date:  5-23  PVR was 2 ml today  Kimmy Tenorio LPN

## 2021-10-25 NOTE — PROGRESS NOTES
"CHIEF COMPLAINT   Gavin House who is a 75 year old male returns today for follow-up of urinary retention.      HPI   Gavin House is a 75 year old male who presents with a history of EtOH abuse, afib, HTN, PUD, urinary retention.  He had Moore placed 8/1/2021 for bladder scan pvr of 815 ml. Was started on tamsulosin and underwent successful trial of void 8/17/2021    AUA symptom score 4-9-4-1-1-0-1 = 5 QOL pleased    He is doing well with urination on flomax    PHYSICAL EXAM  Patient is a 75 year old  male   Vitals: Blood pressure 130/70, pulse 73, height 1.727 m (5' 8\"), weight 49 kg (108 lb), SpO2 97 %.  Body mass index is 16.42 kg/m .  General Appearance Adult:   Alert, no acute distress, oriented  HENT: throat/mouth:normal, good dentition  Lungs: no respiratory distress, or pursed lip breathing  Heart: No obvious jugular venous distension present  Abdomen: soft, nontender, no organomegaly or masses  Musculoskeltal: extremities normal, no peripheral edema  Skin: no suspicious lesions or rashes  Neuro: Alert, oriented, speech and mentation normal  Psych: affect and mood normal  Gait: Normal  : circ phallus    All pertinent imaging reviewed:    PVR 2 ml    Component PSA   Latest Ref Rng & Units 0.00 - 4.00 ug/L   4/22/2005 1.78   10/23/2009 1.28   7/22/2011 2.16   12/4/2013 1.98   12/3/2014 3.27   8/23/2017 2.79   10/25/2021 2.30       PRE-PROCEDURE DIAGNOSIS: BPH with retention    POST-PROCEDURE DIAGNOSIS: BPH with retention    PROCEDURE: Cystoscopy    DESCRIPTION OF PROCEDURE: After informed consent was obtained, the patient was brought to the procedure room where he was placed in the supine position with all pressure points well padded.  The penis was prepped and draped in sterile fashion. A flexible cystoscope was introduced through a well-lubricated urethra.     Anterior urethra normal  Prostatic urethra with assymetric bilobar R>L hypertrophy with circumferential intravesical protrusion, moderate " trabeculation, no cellules or diverticulae, no concerning bladder tumors or bladder stones  The flexible cystoscope was removed and the findings were described to the patient.     ASSESSMENT and PLAN  75 year old male who presents with a history of EtOH abuse, afib, HTN, PUD, urinary retention.  He had Moore placed 8/1/2021 for bladder scan pvr of 815 ml. Was started on tamsulosin and underwent successful trial of void 8/17/2021. He is doing well with urination on flomax. Emptying bladder very well. Has obstructive hypertrophy of prostate on cysto but if he is doing well on medical management will defer outlet procedure at this time. PSA is low at 2.30 ng/ml    Continue tamsulosin  1 year UA, PVR, AUA symptom score    Cameron Kowalski MD   [facility]  [facility] Phone: [phone]

## 2021-10-25 NOTE — LETTER
"10/25/2021       RE: Gavin House  [address]  [facility] [address]     Dear Colleague,    Thank you for referring your patient, Gavin House, to the [facility]. Please see a copy of my visit note below.    CHIEF COMPLAINT   Gavin House who is a 75 year old male returns today for follow-up of urinary retention.      HPI   Gavin House is a 75 year old male who presents with a history of EtOH abuse, afib, HTN, PUD, urinary retention.  He had Moore placed 8/1/2021 for bladder scan pvr of 815 ml. Was started on tamsulosin and underwent successful trial of void 8/17/2021    AUA symptom score 7-8-3-1-1-0-1 = 5 QOL pleased    He is doing well with urination on flomax    PHYSICAL EXAM  Patient is a 75 year old  male   Vitals: Blood pressure 130/70, pulse 73, height 1.727 m (5' 8\"), weight 49 kg (108 lb), SpO2 97 %.  Body mass index is 16.42 kg/m .  General Appearance Adult:   Alert, no acute distress, oriented  HENT: throat/mouth:normal, good dentition  Lungs: no respiratory distress, or pursed lip breathing  Heart: No obvious jugular venous distension present  Abdomen: soft, nontender, no organomegaly or masses  Musculoskeltal: extremities normal, no peripheral edema  Skin: no suspicious lesions or rashes  Neuro: Alert, oriented, speech and mentation normal  Psych: affect and mood normal  Gait: Normal  : circ phallus    All pertinent imaging reviewed:    PVR 2 ml    Component PSA   Latest Ref Rng & Units 0.00 - 4.00 ug/L   4/22/2005 1.78   10/23/2009 1.28   7/22/2011 2.16   12/4/2013 1.98   12/3/2014 3.27   8/23/2017 2.79   10/25/2021 2.30       PRE-PROCEDURE DIAGNOSIS: BPH with retention    POST-PROCEDURE DIAGNOSIS: BPH with retention    PROCEDURE: Cystoscopy    DESCRIPTION OF PROCEDURE: After informed consent was obtained, the patient was brought to the procedure room where he was placed in the supine position " with all pressure points well padded.  The penis was prepped and draped in sterile fashion. A flexible cystoscope was introduced through a well-lubricated urethra.     Anterior urethra normal  Prostatic urethra with assymetric bilobar R>L hypertrophy with circumferential intravesical protrusion, moderate trabeculation, no cellules or diverticulae, no concerning bladder tumors or bladder stones  The flexible cystoscope was removed and the findings were described to the patient.     ASSESSMENT and PLAN  75 year old male who presents with a history of EtOH abuse, afib, HTN, PUD, urinary retention.  He had Moore placed 8/1/2021 for bladder scan pvr of 815 ml. Was started on tamsulosin and underwent successful trial of void 8/17/2021. He is doing well with urination on flomax. Emptying bladder very well. Has obstructive hypertrophy of prostate on cysto but if he is doing well on medical management will defer outlet procedure at this time. PSA is low at 2.30 ng/ml    Continue tamsulosin  1 year UA, PVR, AUA symptom score    Cameron Kowalski MD   [facility]  [facility] Phone: [phone]

## 2021-10-25 NOTE — PATIENT INSTRUCTIONS

## 2021-10-29 ENCOUNTER — LAB (OUTPATIENT)
Dept: LAB | Facility: CLINIC | Age: 76
End: 2021-10-29
Payer: COMMERCIAL

## 2021-10-29 DIAGNOSIS — R41.3 MEMORY PROBLEM: ICD-10-CM

## 2021-10-29 DIAGNOSIS — Z13.6 CARDIOVASCULAR SCREENING; LDL GOAL LESS THAN 160: ICD-10-CM

## 2021-10-29 LAB
ALBUMIN SERPL-MCNC: 3.4 G/DL (ref 3.4–5)
ALP SERPL-CCNC: 78 U/L (ref 40–150)
ALT SERPL W P-5'-P-CCNC: 16 U/L (ref 0–70)
ANION GAP SERPL CALCULATED.3IONS-SCNC: 5 MMOL/L (ref 3–14)
AST SERPL W P-5'-P-CCNC: 15 U/L (ref 0–45)
BILIRUB SERPL-MCNC: 0.6 MG/DL (ref 0.2–1.3)
BUN SERPL-MCNC: 13 MG/DL (ref 7–30)
CALCIUM SERPL-MCNC: 8.3 MG/DL (ref 8.5–10.1)
CHLORIDE BLD-SCNC: 108 MMOL/L (ref 94–109)
CHOLEST SERPL-MCNC: 172 MG/DL
CO2 SERPL-SCNC: 28 MMOL/L (ref 20–32)
CREAT SERPL-MCNC: 1.02 MG/DL (ref 0.66–1.25)
ERYTHROCYTE [DISTWIDTH] IN BLOOD BY AUTOMATED COUNT: 12.4 % (ref 10–15)
FASTING STATUS PATIENT QL REPORTED: YES
FOLATE SERPL-MCNC: 8.2 NG/ML
GFR SERPL CREATININE-BSD FRML MDRD: 72 ML/MIN/1.73M2
GLUCOSE BLD-MCNC: 91 MG/DL (ref 70–99)
HCT VFR BLD AUTO: 42.8 % (ref 40–53)
HDLC SERPL-MCNC: 49 MG/DL
HGB BLD-MCNC: 14.1 G/DL (ref 13.3–17.7)
LDLC SERPL CALC-MCNC: 101 MG/DL
MCH RBC QN AUTO: 32.4 PG (ref 26.5–33)
MCHC RBC AUTO-ENTMCNC: 32.9 G/DL (ref 31.5–36.5)
MCV RBC AUTO: 98 FL (ref 78–100)
NONHDLC SERPL-MCNC: 123 MG/DL
PLATELET # BLD AUTO: 217 10E3/UL (ref 150–450)
POTASSIUM BLD-SCNC: 3.7 MMOL/L (ref 3.4–5.3)
PROT SERPL-MCNC: 6.9 G/DL (ref 6.8–8.8)
RBC # BLD AUTO: 4.35 10E6/UL (ref 4.4–5.9)
SODIUM SERPL-SCNC: 141 MMOL/L (ref 133–144)
TRIGL SERPL-MCNC: 110 MG/DL
TSH SERPL DL<=0.005 MIU/L-ACNC: 2.02 MU/L (ref 0.4–4)
VIT B12 SERPL-MCNC: 249 PG/ML (ref 193–986)
WBC # BLD AUTO: 5.1 10E3/UL (ref 4–11)

## 2021-10-29 PROCEDURE — 82746 ASSAY OF FOLIC ACID SERUM: CPT

## 2021-10-29 PROCEDURE — 80053 COMPREHEN METABOLIC PANEL: CPT

## 2021-10-29 PROCEDURE — 84443 ASSAY THYROID STIM HORMONE: CPT

## 2021-10-29 PROCEDURE — 36415 COLL VENOUS BLD VENIPUNCTURE: CPT

## 2021-10-29 PROCEDURE — 82607 VITAMIN B-12: CPT

## 2021-10-29 PROCEDURE — 80061 LIPID PANEL: CPT

## 2021-10-29 PROCEDURE — 85027 COMPLETE CBC AUTOMATED: CPT

## 2021-12-03 ENCOUNTER — OFFICE VISIT (OUTPATIENT)
Dept: NEUROLOGY | Facility: CLINIC | Age: 76
End: 2021-12-03
Attending: INTERNAL MEDICINE
Payer: COMMERCIAL

## 2021-12-03 VITALS
SYSTOLIC BLOOD PRESSURE: 147 MMHG | OXYGEN SATURATION: 98 % | BODY MASS INDEX: 16.61 KG/M2 | WEIGHT: 109.6 LBS | DIASTOLIC BLOOD PRESSURE: 79 MMHG | HEART RATE: 74 BPM | HEIGHT: 68 IN

## 2021-12-03 DIAGNOSIS — R79.9 ABNORMAL FINDING OF BLOOD CHEMISTRY, UNSPECIFIED: ICD-10-CM

## 2021-12-03 DIAGNOSIS — Z86.73 HISTORY OF STROKE: ICD-10-CM

## 2021-12-03 DIAGNOSIS — R41.3 MEMORY LOSS: Primary | ICD-10-CM

## 2021-12-03 PROCEDURE — G0463 HOSPITAL OUTPT CLINIC VISIT: HCPCS

## 2021-12-03 PROCEDURE — 99205 OFFICE O/P NEW HI 60 MIN: CPT | Performed by: PSYCHIATRY & NEUROLOGY

## 2021-12-03 ASSESSMENT — MONTREAL COGNITIVE ASSESSMENT (MOCA)
VISUOSPATIAL/EXECUTIVE SUBSCORE: 2
7. [VIGILENCE] TAP WHEN HEARING DESIGNATED LETTER: 1
12. MEMORY INDEX SCORE: 0
WHAT LEVEL OF EDUCATION WAS ATTAINED: 1
8. SERIAL SUBTRACTION OF 7S: 0
9. REPEAT EACH SENTENCE: 1
6. READ LIST OF DIGITS [FORWARD/BACKWARD]: 1
WHAT IS THE TOTAL SCORE (OUT OF 30): 13
11. FOR EACH PAIR OF WORDS, WHAT CATEGORY DO THEY BELONG TO (OUT OF 2): 1
10. [FLUENCY] NAME WORDS STARTING WITH DESIGNATED LETTER: 0
13. ORIENTATION SUBSCORE: 3
4. NAME EACH OF THE THREE ANIMALS SHOWN: 3

## 2021-12-03 ASSESSMENT — MIFFLIN-ST. JEOR: SCORE: 1206.64

## 2021-12-03 NOTE — LETTER
12/3/2021         RE: Gavin House  [address]  [facility] [address]        Dear Colleague,    Thank you for referring your patient, Gavin House, to the [facility] [place]. Please see a copy of my visit note below.    INITIAL NEUROLOGY CONSULTATION    DATE OF VISIT: 12/3/2021  CLINIC LOCATION: [facility]  MRN: [id number]  PATIENT NAME: Gavin House  YOB: 1945    PRIMARY CARE PROVIDER: Kash Eller MD     REASON FOR VISIT:   Chief Complaint   Patient presents with     Memory Loss     HISTORY OF PRESENT ILLNESS:                                                    Mr. Gavin House is 76 year old right handed male patient with past medical history of paroxysmal atrial fibrillation, hypertension, alcohol abuse, and right pneumothorax, who was seen in consultation today requested by Kash Eller MD, for memory loss.  The patient is accompanied by his wife, who participates in the interview today.    Per patient's report, he does not think that he has problems with memory.  He also denies any significant health issues, although acknowledges that he uses inhaler for asthma.  When wife mentions regular visits with cardiologist, patient seems to be surprised to hear that he has irregular heartbeat.  He denies any focal neurological symptoms.    According to patient's wife, she noticed 's cognitive issues approximately 2 years ago.  She feels that they are gradually progressing.  He frequently repeats himself.  She believes that long-term memory is more affected than short-term memory.  She did not notice any word finding difficulty, but admits that the patient has difficulty with calendar.  She always paid the bills, but bought a pillbox for the patient because he was missing his medication times.  She denies any safety concerns, such as left on stove burners, water facets or keys outside of the doors.  He continues to  drive during the day, but asks his wife to drive at night due to poor vision.  He did not get lost nor had recent car accidents or tickets.  She did not notice any mood or personality changes.  She reports that his swallowing is affected, and he frequently feels thirsty.  He typically has 1-2 alcohol drinks per day, and occasionally more.  She does not have any additional concerns.    Laboratory evaluation from 10/29/2021 includes unremarkable CMP, elevated LDL (101), normal TSH (2.02), low normal vitamin B12 (249), and unremarkable CBC.    Brain MRI with and without contrast from 10/8/2021 demonstrated volume loss, scattered T2 hyperintensities, felt to represent chronic small vessel ischemic changes, and multiple small old strokes.  Images were personally reviewed and independently interpreted.    No additional useful information is available in Care Everywhere, which was reviewed.    Review of Systems - the patient endorses swallowing, asthma, hay fever, and heartburn.  All of them have been previously discussed with other medical providers.  Otherwise, he denies any other complaints on 14-point comprehensive review of systems.  PAST MEDICAL/SURGICAL HISTORY:                                                    I personally reviewed patient's past medical and surgical history with the patient at today's visit.  MEDICATIONS:                                                    I personally reviewed patient's medications and allergies with the patient at today's visit.  aspirin (ASA) 325 MG EC tablet, Take 1 tablet (325 mg) by mouth daily  Ca Carbonate-Mag Hydroxide (ROLAIDS PO), Take by mouth as needed  EPINEPHrine (PRIMATENE MIST) 0.125 MG/ACT AERO, Inhale 1 puff into the lungs daily as needed  flecainide (TAMBOCOR) 100 MG tablet, Take 1 tablet (100 mg) by mouth 2 times daily  lisinopril (ZESTRIL) 20 MG tablet, Take 1 tablet (20 mg) by mouth daily  tamsulosin (FLOMAX) 0.4 MG capsule, Take 1 capsule (0.4 mg) by mouth  "daily  metroNIDAZOLE (NORITATE) 1 % external cream, Apply topically daily (Patient not taking: Reported on 12/3/2021)    No current facility-administered medications on file prior to visit.    ALLERGIES:                                                      Allergies   Allergen Reactions     Penicillins      FAMILY/SOCIAL HISTORY:                                                    Family and social history was reviewed with the patient at today's visit.  No family history of neurological disorders, except stroke (mother).  Former smoker.  1-2 alcohol drinks per day, denies recreational drug use.  .  Retired.  REVIEW OF SYSTEMS:                                                    Patient has completed a [facility] Patient Health History, including a 14-system review, which was personally reviewed, and pertinent positives are listed in HPI. He denies any additional problems on the further questioning.  EXAM:                                                    VITAL SIGNS:   BP (!) 147/79 (BP Location: Right arm, Patient Position: Sitting, Cuff Size: Adult Small)   Pulse 74   Ht 1.727 m (5' 8\")   Wt 49.7 kg (109 lb 9.6 oz)   SpO2 98%   BMI 16.66 kg/m    [place] Cognitive Assessment:    [place] Cognitive Assessment (MOCA)  Visuospatial/Executive : 2  Naming: 3  Attention - Digits: 1  Attention - Letters: 1  Attention - Subtraction: 0  Language - Repeat: 1  Language - Fluency : 0  Abstraction: 1  Delayed Recall: 0  Orientation: 3  Education: 1  MOCA Score: 13  Administered by: : Alena AMBROSE     [place] Cognitive Assessment Score:  MOCA Score: 13/30.     General: pt is in NAD, cooperative.  Skin: normal turgor, moist mucous membranes, no lesions/rashes noticed.  HEENT: ATNC, EOMI, PERRL, white sclera, normal conjunctiva, no nystagmus or ptosis. No carotid bruits bilaterally.  Respiratory: lung sounds clear to auscultation bilaterally, no crackles, wheezes, rhonchi. Symmetric lung excursion, no accessory " respiratory muscle use.  Cardiovascular: normal S1/S2, no murmurs/rubs/gallops.   Abdomen: Not distended.  : deferred.    Neurological:  Mental: alert, follows commands, MoCA as per above, no aphasia or dysarthria. Fund of knowledge is diminished for age.  Cranial Nerves:  CN II: visual acuity - able to accurately count fingers with each eye. Visual fields intact, fundi: discs sharp, no papilledema and normal vessels bilaterally.  CN III, IV, VI: EOM intact, pupils equal and reactive  CN V: facial sensation nl  CN VII: face symmetric, no facial droop  CN VIII: hearing normal  CN IX: palate elevation symmetric, uvula at midline  CN XI SCM normal, shoulder shrug nl  CN XII: tongue midline  Motor: Strength: 5/5 in all major groups of all extremities. Normal tone. No abnormal movements. No pronator drift b/l.  Reflexes: Triceps, biceps, brachioradialis, patellar, and achilles reflexes normal and symmetric. No clonus noted. Toes are down-going b/l.   Sensory: light touch, pinprick, and vibration intact. Romberg: negative.  Coordination: FNF and heel-shin tests intact b/l.   Gait:  Normal, except slight difficulty with tandem walk.  DATA:   LABS/IMAGING/OTHER STUDIES: I reviewed pertinent medical records, including personal review of brain MRI images and Care Everywhere, as detailed in the history of present illness.  ASSESSMENT AND PLAN:      ASSESSMENT: Gavin House is a 76 year old male patient with listed above past medical history, who presents with memory loss.    We had a detailed discussion with the patient and his wife regarding his presenting complaints.  MoCA today is 13/30, consistent with mild dementia.  The rest of neurological exam is non-focal.  Vitamin B12 is low normal, TSH is normal.  Brain MRI demonstrated scattered old infarcts along with advanced small vessel ischemic disease and atrophy.  We reviewed images together.    We discussed that he has multiple risk factors for stroke including  diabetes, hypertension, and paroxysmal atrial fibrillation.  Currently he is on aspirin, but in my opinion would benefit from oral anticoagulation in light of recently discovered strokes.  We will check his hemoglobin A1C.  We reviewed secondary stroke prevention measures, as summarized below.    We discussed that clinical presentation might be consistent with vitamin deficiencies (thiamine and B12), cognitive dysfunction related to chronic alcohol use, advanced small vessel ischemic disease, and neurodegenerative conditions with Alzheimer's disease being most common.  For further diagnostic clarification I would check his methylmalonic acid and thiamine level along with CPT and neuropsychology evaluation.    Gavin to follow up with Primary Care provider regarding elevated blood pressure.     DIAGNOSES:    ICD-10-CM    1. Memory loss  R41.3 Vitamin B1 whole blood     Methylmalonic Acid     Occupational Therapy Referral     NEUROPSYCHOLOGY REFERRAL     Adult Neurology Referral   2. History of stroke  Z86.73 Hemoglobin A1c   3. Abnormal finding of blood chemistry, unspecified   R79.9 Hemoglobin A1c     PLAN: At today's visit we thoroughly discussed various diagnostic possibilities for patient's symptoms, necessary evaluation, and the plan, which includes:  Orders Placed This Encounter   Procedures     Vitamin B1 whole blood     Methylmalonic Acid     Hemoglobin A1c     Occupational Therapy Referral     NEUROPSYCHOLOGY REFERRAL     No new medications.    Advised the patient to stay physically and mentally active with particular emphasis on daily mentally stimulating activities of his choice (such as crosswords, puzzles, sudoku, etc.), stretching exercises, walking, and healthy eating.    For secondary stroke prevention, I counseled the patient to:  -discuss whether stronger than aspirin medication needed for atrial fibrillation with cardiologist and primary care provider in light of recently discovered strokes, but  continue aspirin every day for now  -Discuss with Dr. Eller starting a cholesterol-lowering medication to keep LDL at goal (see below)  -Long-term blood pressure goal is less than 130/85  -Long-term LDL goal is 40-70  -Long-term goal of hemoglobin A1C is less than 7.0  -Low-salt low-fat diet  -Regular aerobic exercise 30 minutes 3-4 times per week    Additional recommendations after the work-up.    Next follow-up appointment is in the next 3 months or earlier if needed.    Total Time: 81 minutes spent on the date of the encounter doing chart review, history and exam, documentation and further activities per the note.    Salo Ramos MD  [facility]  (Chart documentation was completed in part with Dragon voice-recognition software. Even though reviewed, some grammatical, spelling, and word errors may remain.)              Again, thank you for allowing me to participate in the care of your patient.        Sincerely,        Salo Ramos MD

## 2021-12-03 NOTE — PATIENT INSTRUCTIONS
AFTER VISIT SUMMARY (AVS):    At today's visit we thoroughly discussed various diagnostic possibilities for your symptoms, necessary evaluation, and the plan, which includes:  Orders Placed This Encounter   Procedures     Vitamin B1 whole blood     Methylmalonic Acid     Hemoglobin A1c     Occupational Therapy Referral     NEUROPSYCHOLOGY REFERRAL     No new medications.    Stay physically and mentally active with particular emphasis on daily mentally stimulating activities of your choice (such as crosswords, puzzles, sudoku, etc.), stretching exercises, walking, and healthy eating.    For secondary stroke prevention:  -Please discuss whether stronger than aspirin medication needed for atrial fibrillation with cardiologist and primary care provider, but continue aspirin every day for now  -Discussed with Dr. Eller starting a cholesterol-lowering medication to keep LDL at goal (see below)  -Long-term blood pressure goal is less than 130/85  -Long-term LDL goal is 40-70  -Long-term goal of hemoglobin A1C is less than 7.0  -Low-salt low-fat diet  -Regular aerobic exercise 30 minutes 3-4 times per week    Additional recommendations after the work-up.    Next follow-up appointment is in the next 3 months or earlier if needed.    Please do not hesitate to call me with any questions or concerns.    Thanks.

## 2021-12-03 NOTE — PROGRESS NOTES
INITIAL NEUROLOGY CONSULTATION    DATE OF VISIT: 12/3/2021  CLINIC LOCATION: [facility]  MRN: [id number]  PATIENT NAME: Gavin House  YOB: 1945    PRIMARY CARE PROVIDER: Kash Eller MD     REASON FOR VISIT:   Chief Complaint   Patient presents with     Memory Loss     HISTORY OF PRESENT ILLNESS:                                                    Mr. Gavin House is 76 year old right handed male patient with past medical history of paroxysmal atrial fibrillation, hypertension, alcohol abuse, and right pneumothorax, who was seen in consultation today requested by Kash Eller MD, for memory loss.  The patient is accompanied by his wife, who participates in the interview today.    Per patient's report, he does not think that he has problems with memory.  He also denies any significant health issues, although acknowledges that he uses inhaler for asthma.  When wife mentions regular visits with cardiologist, patient seems to be surprised to hear that he has irregular heartbeat.  He denies any focal neurological symptoms.    According to patient's wife, she noticed 's cognitive issues approximately 2 years ago.  She feels that they are gradually progressing.  He frequently repeats himself.  She believes that long-term memory is more affected than short-term memory.  She did not notice any word finding difficulty, but admits that the patient has difficulty with calendar.  She always paid the bills, but bought a pillbox for the patient because he was missing his medication times.  She denies any safety concerns, such as left on stove burners, water facets or keys outside of the doors.  He continues to drive during the day, but asks his wife to drive at night due to poor vision.  He did not get lost nor had recent car accidents or tickets.  She did not notice any mood or personality changes.  She reports that his swallowing is affected, and he frequently  feels thirsty.  He typically has 1-2 alcohol drinks per day, and occasionally more.  She does not have any additional concerns.    Laboratory evaluation from 10/29/2021 includes unremarkable CMP, elevated LDL (101), normal TSH (2.02), low normal vitamin B12 (249), and unremarkable CBC.    Brain MRI with and without contrast from 10/8/2021 demonstrated volume loss, scattered T2 hyperintensities, felt to represent chronic small vessel ischemic changes, and multiple small old strokes.  Images were personally reviewed and independently interpreted.    No additional useful information is available in Care Everywhere, which was reviewed.    Review of Systems - the patient endorses swallowing, asthma, hay fever, and heartburn.  All of them have been previously discussed with other medical providers.  Otherwise, he denies any other complaints on 14-point comprehensive review of systems.  PAST MEDICAL/SURGICAL HISTORY:                                                    I personally reviewed patient's past medical and surgical history with the patient at today's visit.  MEDICATIONS:                                                    I personally reviewed patient's medications and allergies with the patient at today's visit.  aspirin (ASA) 325 MG EC tablet, Take 1 tablet (325 mg) by mouth daily  Ca Carbonate-Mag Hydroxide (ROLAIDS PO), Take by mouth as needed  EPINEPHrine (PRIMATENE MIST) 0.125 MG/ACT AERO, Inhale 1 puff into the lungs daily as needed  flecainide (TAMBOCOR) 100 MG tablet, Take 1 tablet (100 mg) by mouth 2 times daily  lisinopril (ZESTRIL) 20 MG tablet, Take 1 tablet (20 mg) by mouth daily  tamsulosin (FLOMAX) 0.4 MG capsule, Take 1 capsule (0.4 mg) by mouth daily  metroNIDAZOLE (NORITATE) 1 % external cream, Apply topically daily (Patient not taking: Reported on 12/3/2021)    No current facility-administered medications on file prior to visit.    ALLERGIES:                                                   "    Allergies   Allergen Reactions     Penicillins      FAMILY/SOCIAL HISTORY:                                                    Family and social history was reviewed with the patient at today's visit.  No family history of neurological disorders, except stroke (mother).  Former smoker.  1-2 alcohol drinks per day, denies recreational drug use.  .  Retired.  REVIEW OF SYSTEMS:                                                    Patient has completed a [facility] Patient Health History, including a 14-system review, which was personally reviewed, and pertinent positives are listed in HPI. He denies any additional problems on the further questioning.  EXAM:                                                    VITAL SIGNS:   BP (!) 147/79 (BP Location: Right arm, Patient Position: Sitting, Cuff Size: Adult Small)   Pulse 74   Ht 1.727 m (5' 8\")   Wt 49.7 kg (109 lb 9.6 oz)   SpO2 98%   BMI 16.66 kg/m    [place] Cognitive Assessment:    [place] Cognitive Assessment (MOCA)  Visuospatial/Executive : 2  Naming: 3  Attention - Digits: 1  Attention - Letters: 1  Attention - Subtraction: 0  Language - Repeat: 1  Language - Fluency : 0  Abstraction: 1  Delayed Recall: 0  Orientation: 3  Education: 1  MOCA Score: 13  Administered by: : Alena AMBROSE     [place] Cognitive Assessment Score:  MOCA Score: 13/30.     General: pt is in NAD, cooperative.  Skin: normal turgor, moist mucous membranes, no lesions/rashes noticed.  HEENT: ATNC, EOMI, PERRL, white sclera, normal conjunctiva, no nystagmus or ptosis. No carotid bruits bilaterally.  Respiratory: lung sounds clear to auscultation bilaterally, no crackles, wheezes, rhonchi. Symmetric lung excursion, no accessory respiratory muscle use.  Cardiovascular: normal S1/S2, no murmurs/rubs/gallops.   Abdomen: Not distended.  : deferred.    Neurological:  Mental: alert, follows commands, MoCA as per above, no aphasia or dysarthria. Fund of knowledge is diminished for " age.  Cranial Nerves:  CN II: visual acuity - able to accurately count fingers with each eye. Visual fields intact, fundi: discs sharp, no papilledema and normal vessels bilaterally.  CN III, IV, VI: EOM intact, pupils equal and reactive  CN V: facial sensation nl  CN VII: face symmetric, no facial droop  CN VIII: hearing normal  CN IX: palate elevation symmetric, uvula at midline  CN XI SCM normal, shoulder shrug nl  CN XII: tongue midline  Motor: Strength: 5/5 in all major groups of all extremities. Normal tone. No abnormal movements. No pronator drift b/l.  Reflexes: Triceps, biceps, brachioradialis, patellar, and achilles reflexes normal and symmetric. No clonus noted. Toes are down-going b/l.   Sensory: light touch, pinprick, and vibration intact. Romberg: negative.  Coordination: FNF and heel-shin tests intact b/l.   Gait:  Normal, except slight difficulty with tandem walk.  DATA:   LABS/IMAGING/OTHER STUDIES: I reviewed pertinent medical records, including personal review of brain MRI images and Care Everywhere, as detailed in the history of present illness.  ASSESSMENT AND PLAN:      ASSESSMENT: Gavin House is a 76 year old male patient with listed above past medical history, who presents with memory loss.    We had a detailed discussion with the patient and his wife regarding his presenting complaints.  MoCA today is 13/30, consistent with mild dementia.  The rest of neurological exam is non-focal.  Vitamin B12 is low normal, TSH is normal.  Brain MRI demonstrated scattered old infarcts along with advanced small vessel ischemic disease and atrophy.  We reviewed images together.    We discussed that he has multiple risk factors for stroke including diabetes, hypertension, and paroxysmal atrial fibrillation.  Currently he is on aspirin, but in my opinion would benefit from oral anticoagulation in light of recently discovered strokes.  We will check his hemoglobin A1C.  We reviewed secondary stroke  prevention measures, as summarized below.    We discussed that clinical presentation might be consistent with vitamin deficiencies (thiamine and B12), cognitive dysfunction related to chronic alcohol use, advanced small vessel ischemic disease, and neurodegenerative conditions with Alzheimer's disease being most common.  For further diagnostic clarification I would check his methylmalonic acid and thiamine level along with CPT and neuropsychology evaluation.    Gavin to follow up with Primary Care provider regarding elevated blood pressure.     DIAGNOSES:    ICD-10-CM    1. Memory loss  R41.3 Vitamin B1 whole blood     Methylmalonic Acid     Occupational Therapy Referral     NEUROPSYCHOLOGY REFERRAL     Adult Neurology Referral   2. History of stroke  Z86.73 Hemoglobin A1c   3. Abnormal finding of blood chemistry, unspecified   R79.9 Hemoglobin A1c     PLAN: At today's visit we thoroughly discussed various diagnostic possibilities for patient's symptoms, necessary evaluation, and the plan, which includes:  Orders Placed This Encounter   Procedures     Vitamin B1 whole blood     Methylmalonic Acid     Hemoglobin A1c     Occupational Therapy Referral     NEUROPSYCHOLOGY REFERRAL     No new medications.    Advised the patient to stay physically and mentally active with particular emphasis on daily mentally stimulating activities of his choice (such as crosswords, puzzles, sudoku, etc.), stretching exercises, walking, and healthy eating.    For secondary stroke prevention, I counseled the patient to:  -discuss whether stronger than aspirin medication needed for atrial fibrillation with cardiologist and primary care provider in light of recently discovered strokes, but continue aspirin every day for now  -Discuss with Dr. Eller starting a cholesterol-lowering medication to keep LDL at goal (see below)  -Long-term blood pressure goal is less than 130/85  -Long-term LDL goal is 40-70  -Long-term goal of hemoglobin A1C is  less than 7.0  -Low-salt low-fat diet  -Regular aerobic exercise 30 minutes 3-4 times per week    Additional recommendations after the work-up.    Next follow-up appointment is in the next 3 months or earlier if needed.    Total Time: 81 minutes spent on the date of the encounter doing chart review, history and exam, documentation and further activities per the note.    Salo Ramos MD  [facility]  (Chart documentation was completed in part with Dragon voice-recognition software. Even though reviewed, some grammatical, spelling, and word errors may remain.)

## 2021-12-14 ENCOUNTER — LAB (OUTPATIENT)
Dept: LAB | Facility: CLINIC | Age: 76
End: 2021-12-14
Payer: COMMERCIAL

## 2021-12-14 DIAGNOSIS — Z86.73 HISTORY OF STROKE: ICD-10-CM

## 2021-12-14 DIAGNOSIS — R41.3 MEMORY LOSS: ICD-10-CM

## 2021-12-14 DIAGNOSIS — R79.9 ABNORMAL FINDING OF BLOOD CHEMISTRY, UNSPECIFIED: ICD-10-CM

## 2021-12-14 LAB — HBA1C MFR BLD: 5 % (ref 0–5.6)

## 2021-12-14 PROCEDURE — 83921 ORGANIC ACID SINGLE QUANT: CPT

## 2021-12-14 PROCEDURE — 83036 HEMOGLOBIN GLYCOSYLATED A1C: CPT

## 2021-12-14 PROCEDURE — 36415 COLL VENOUS BLD VENIPUNCTURE: CPT

## 2021-12-14 PROCEDURE — 99000 SPECIMEN HANDLING OFFICE-LAB: CPT

## 2021-12-14 PROCEDURE — 84425 ASSAY OF VITAMIN B-1: CPT | Mod: 90

## 2021-12-17 LAB — VIT B1 PYROPHOSHATE BLD-SCNC: 147 NMOL/L

## 2021-12-21 ENCOUNTER — TELEPHONE (OUTPATIENT)
Dept: NEUROLOGY | Facility: CLINIC | Age: 76
End: 2021-12-21
Payer: COMMERCIAL

## 2021-12-21 LAB — METHYLMALONATE SERPL-SCNC: 0.29 UMOL/L (ref 0–0.4)

## 2021-12-21 NOTE — TELEPHONE ENCOUNTER
Out bound call to Gavin left message for to adv his labs came back normal and we will see him at this next appointment

## 2021-12-21 NOTE — TELEPHONE ENCOUNTER
----- Message from Salo Ramos MD sent at 12/21/2021  7:56 AM CST -----  Please advise the patient that his labs are normal.Thanks,Salo Ramos MD.

## 2022-01-12 ENCOUNTER — HOSPITAL ENCOUNTER (OUTPATIENT)
Dept: OCCUPATIONAL THERAPY | Facility: CLINIC | Age: 77
Setting detail: THERAPIES SERIES
End: 2022-01-12
Attending: PSYCHIATRY & NEUROLOGY
Payer: COMMERCIAL

## 2022-01-12 DIAGNOSIS — R41.3 MEMORY LOSS: ICD-10-CM

## 2022-01-12 PROCEDURE — 97535 SELF CARE MNGMENT TRAINING: CPT | Mod: GO | Performed by: OCCUPATIONAL THERAPIST

## 2022-01-12 PROCEDURE — 96125 COGNITIVE TEST BY HC PRO: CPT | Mod: GO | Performed by: OCCUPATIONAL THERAPIST

## 2022-01-12 PROCEDURE — 97165 OT EVAL LOW COMPLEX 30 MIN: CPT | Mod: GO | Performed by: OCCUPATIONAL THERAPIST

## 2022-01-12 NOTE — PROGRESS NOTES
01/12/22 1200   Quick Adds   Quick Adds Certification   Type of Visit Initial Outpatient Occupational Therapy Evaluation   General Information   Start Of Care Date 01/12/22   Referring Physician Dr. Ramos   Orders Evaluate and treat as indicated   Orders Date 01/12/22   Medical Diagnosis Memory Loss   Onset of Illness/Injury or Date of Surgery 01/12/22   Precautions/Limitations   (wife reports hearing loss but no hearing aids)   Surgical/Medical History Reviewed Yes   Additional Occupational Profile Info/Pertinent History of Current Problem Patient referred to OP OT for completion of the Cognitive Performance Test (CPT).  Please refer to Dr. Acuña notes for further details.   Comments/Observations Wife Rubi present for evaluation.     Role/Living Environment   Patient role/Employment history Retired  (stockroom organizer-retired )   Community/Avocational Activities No hobbies, watches TV, has 2 dogs but does not exercise/walk them, no brain exercises (used to play cribbage but no longer does that), drives to the local bar daily (has 2 drinks, visits, and then drives home)   Current Living Environment House   Home/Community Accessibility Comments Patient lives with daughter, son-in-law and 2 grandkids.    Prior Level - Transfers Independent   Prior Level - Ambulation Independent   Prior Level - ADLS Independent   Prior Responsibilities - IADL Driving   Prior Level Comments Wife completes all IADLs. Wife sets up medications and patient responsible for taking (will need reminders that he needs to take them, especially when he is drinking).  Patient drives daily to the bar only otherwise, wife does all the other driving especially at night.   Current Assistive Devices - Mobility   (none)   Patient/family Goals Statement None stated   Fall Risk Screen   Have you fallen 2 or more times in the past year? No   Have you fallen and had an injury in the past year? No   Abuse Screen (yes response referral  indicated)   Feels Unsafe at Home or Work/School no   Feels Threatened by Someone no   Does Anyone Try to Keep You From Having Contact with Others or Doing Things Outside Your Home? no   Physical Signs of Abuse Present no   Cognitive Status Examination   Cognitive Comment Cognitive Performance Test completed-see seperate note.   Visual Perception   Visual Perception Wears glasses   Visual Perception Comments No new changes.    Activity Tolerance   Activity Tolerance Patient will take naps during the day, sleeping well at night.   Instrumental Activities of Daily Living Assessment   IADL Assessment/Observations Patient independent with basic ADLs, no reminders needed.  Wife completes all IADLs.   Planned Therapy Interventions   Planned Therapy Interventions Cognitive performance testing;Self care/Home management   Adult OT Eval Goals   OT Eval Goals (Adult) 1;2    OT Goal 1   Goal Identifier 1-CPT   Goal Description Patient and wife to verbalize understanding of Cognitive Performance Test results and identify 3 strategies to increase patient's safety and independence in the home setting.   Target Date 01/12/22    OT Goal 2   Goal Identifier 2-Memory compensation   Goal Description Patient will verbalize 2-3 memory compensatory strategies and ways to stay cognitively fit to improve memory and independence for remembering appointments, conversations, etc.    Target Date 01/12/22   Clinical Impression   Criteria for Skilled Therapeutic Interventions Met Yes, treatment indicated   OT Diagnosis decreased ADL/IADL safety and independence   Influenced by the following impairments cognitive changes   Assessment of Occupational Performance 1-3 Performance Deficits   Identified Performance Deficits medications, finances, driving   Clinical Decision Making (Complexity) Low complexity   Therapy Frequency Eval and one treatment   Risks and Benefits of Treatment have been explained. Yes   Patient, Family & other staff in agreement  with plan of care Yes   Education Assessment   Barriers To Learning Cognitive   Preferred Learning Style Reading;Demonstration;Pictures/video   Therapy Certification   Certification date from 01/12/22   Certification date to 01/12/22   Total Evaluation Time   OT Eval, Low Complexity Minutes (19678) 10

## 2022-01-12 NOTE — PROGRESS NOTES
[facility]          OUTPATIENT OCCUPATIONAL THERAPY  EVALUATION  PLAN OF TREATMENT FOR OUTPATIENT REHABILITATION  (COMPLETE FOR INITIAL CLAIMS ONLY)  Patient's Last Name, First Name, M.I.  YOB: 1945  Gavin House                        Provider's Name  [facility] Medical Record No.  [id number]                               Onset Date:     01/12/22   Start of Care Date:     01/12/22   Type:     ___PT   _X_OT   ___SLP Medical Diagnosis:     Memory Loss                          OT Diagnosis:     decreased ADL/IADL safety and independence Visits from SOC:  1   _________________________________________________________________________________  Plan of Treatment/Functional Goals:  Cognitive performance testing,Self care/Home management        Goals  Goal Identifier: 1-CPT  Goal Description: Patient and wife to verbalize understanding of Cognitive Performance Test results and identify 3 strategies to increase patient's safety and independence in the home setting.  Target Date: 01/12/22     Goal Identifier: 2-Memory compensation  Goal Description: Patient will verbalize 2-3 memory compensatory strategies and ways to stay cognitively fit to improve memory and independence for remembering appointments, conversations, etc.   Target Date: 01/12/22                       Therapy Frequency: Eval and one treatment        Antonia Desai OT          I CERTIFY THE NEED FOR THESE SERVICES FURNISHED UNDER        THIS PLAN OF TREATMENT AND WHILE UNDER MY CARE     (Physician co-signature of this document indicates review and certification of the therapy plan).              Certification date from: 01/12/22, Certification date to: 01/12/22               Referring Physician: Dr. Ramos     Initial Assessment        See Epic Evaluation      Start Of Care Date: 01/12/22

## 2022-01-12 NOTE — PROGRESS NOTES
Cognitive Performance Test    SUMMARY OF TEST:    The Cognitive Performance Test (CPT) is a standardized performance-based assessment to measure working memory/executive function processing capacities that underlie functional performance. Subtasks include common basic and instrumental activities of daily living (ADL/IADL) which are rated based on the manner in which patients respond to task demands of varying complexity. The total CPT score describes a level of functioning that indicates how information is processed, implications for functional activities, potential safety risks and a recommended level of supervision or assist based on cognitive function. The highest total score on this test is in the range of 5.6 to 5.8.    DATE OF TESTIN2022    RESULTS OF TESTING:                                                                                         CPT Subtest Results    MEDBOX: 4.5/6 SHOP/GLOVES: 6 PHONE: 3.5/6   WASH:  5 TRAVEL:  TOAST:    DRESS:    TOTAL CPT SCORE:  32/39     Average CPT Score  4.57/5.6    INTERPRETATION OF TEST RESULTS:    Based on the Cognitive Performance Test, this patient scored at CPT Level 4.5.  See CPT Levels reference below.    Summary of functional cognitive status:   Patient able to attend fully to all subtests today, denies [place] but wife states that he has some hearing loss.  Patient verbalizes some anxiety with testing but did not present nervous for testing today.  He demonstrated significant difficulties with attention to details, especially with multiple step directions.      Factors affecting performance:  Other:  anxiety with taking tests    Recommendations:    Assist for ADL/IADL:  Meal preparation, Shopping, Finances, Driving and Medication management  Supervision for ADL/IADL:  ADL, Cleaning and Laundry  Supervision in living setting:  Daily checks                                                       TIME ADMINISTERING TEST: 35    TIME FOR  "INTERPRETATION AND PREPARATION OF REPORT: 10    TOTAL TIME: 45      CPT Levels Reference:    Patient's Average CPT Score:  4.5                                                                                                                                                  Individual scores range along a continuum as outlined below.  In addition to cognitive status, other factors may affect safety in a home environment.  Please refer to specific recommendations for this patient.    ___5.6-5.8  Normal functioning (absence of cognitive-functional disability).  Independent in managing personal affairs, monitors and directs own behavior.  Uses complex information to carry out daily activities with safety and accuracy.    Proficient with instrumental activities of daily living (IADL) and learning new activity.  Problems are anticipated, errors are avoided, and consequences of actions are considered.      ___5.0   Mild cognitive-functional disability; deficits in working memory and executive thought processes. Difficulty using complex information. Problems may be observed with recent memory, judgment, reasoning and planning ahead. May be impulsive or have difficulty anticipating consequences.  Safety:  May require assistance to plan ahead; or to manage complex medication schedules, appointments or finances.  Hazardous activities may need to be monitored or limited.  ADL:  Mild functional decline.  Able to complete basic self-care and routine household tasks.  May have difficulty with complex daily tasks such as reading, writing, meal preparation, shopping or driving.   Learns through hands on teaching. Self-centered behavior or difficulty considering the needs of others may be seen related to trouble seeing the  whole picture\". Can appear disorganized or uninhibited.    _X__4.5  Mild to moderate cognitive-functional disability. Significant deficits in working memory and executive thought processes. Judgment, reasoning and " planning show obvious impairment.  Distractible with inability to shift attention/actions given competing stimuli.  Difficulty with problem solving and managing details. Complex daily tasks performed with inconsistency, difficulty, or error.     Safety:  Medications should be monitored, stove use may require supervision, and driving ability may be affected.  Impaired safety awareness with inability to anticipate potential problems.  May not recognize or respond to emergent situations. Requires frequent check-in support.   ADL:  Mild difficulty with simple everyday self-care tasks. Benefits from structured, routine activity.  Will likely need reminders to complete tasks outside of the routine. Requires assistance with planning and IADL tasks like shopping and finances. Learns concrete tasks through repetition, but performance may not generalize. Tends to be impulsive with poor insight. Self centered behavior or inability to consider the needs of others is common.    ___4.0  Moderate cognitive-functional disability; abstract to concrete thought processes. Working memory and executive function impairments are obvious. Difficulty with planning and problem solving.  Behavior is goal-directed, but unable to follow multi-step directions, is easily distracted, and may not recognize mistakes.  Inability to anticipate hazards or understand precautions.  Safety:  Recommend 24-hour supervision for safety. Supervision needed for medication management and for hazardous activities. May not be able to follow a restricted diet. Can get lost in unfamiliar surroundings. Generally, persons functioning at level 4 should not be driving.   ADL:  Some decline in quality or frequency of ADL.  [place] enhanced by use of a routine, simple concrete directions, and caregiver set-up of needed items. Complex tasks such as money or home management typically requires assistance.  Relies heavily on vision to guide behavior; will ignore  objects/hazards not in plain sight and can be distracted by irrelevant objects. Often has poor insight.  Able to carry out social conversation and may verbally  cover  for deficits leading caregivers to believe they are capable of functioning independently.       ___3.5  Moderate cognitive-functional disability; increased cues needed for task completion. Aware of concrete task steps but needs prompting or cues to initiate and complete simple tasks. Attention span is limited, simple directions may need to be repeated, and re-focus to a topic or task may be required.  Safety:  24-hour supervision required for safety and for assistance with daily tasks. Assistance required with medications, and access to medication should be limited. Meals, nutrition and dietary restrictions need to be monitored.  All hazardous activities should be restricted or supervised. Should not drive. Prone to wandering and can become lost.  ADL:  Moderate functional decline. Familiar tasks usually requires set-up of supplies and directions to complete steps. May need objects handed to them for task initiation. Function best with a set schedule in familiar surroundings with familiar people. All complex tasks must be done by others. Vocabulary is diminished and speech often unfocused.

## 2022-03-07 DIAGNOSIS — I48.0 PAROXYSMAL A-FIB (H): Primary | ICD-10-CM

## 2022-03-07 DIAGNOSIS — I10 ESSENTIAL HYPERTENSION: ICD-10-CM

## 2022-03-07 RX ORDER — LISINOPRIL 20 MG/1
20 TABLET ORAL DAILY
Qty: 90 TABLET | Refills: 0 | Status: SHIPPED | OUTPATIENT
Start: 2022-03-07 | End: 2022-03-29

## 2022-03-07 RX ORDER — FLECAINIDE ACETATE 100 MG/1
100 TABLET ORAL 2 TIMES DAILY
Qty: 180 TABLET | Refills: 3 | Status: SHIPPED | OUTPATIENT
Start: 2022-03-07 | End: 2022-03-29

## 2022-03-07 NOTE — TELEPHONE ENCOUNTER
[facility] Cardiology Refill Guideline reviewed.  Medication meets criteria for refill. Appt is due now. Letter is sent.

## 2022-03-29 ENCOUNTER — OFFICE VISIT (OUTPATIENT)
Dept: CARDIOLOGY | Facility: CLINIC | Age: 77
End: 2022-03-29
Payer: COMMERCIAL

## 2022-03-29 VITALS
HEIGHT: 68 IN | HEART RATE: 81 BPM | BODY MASS INDEX: 16.25 KG/M2 | SYSTOLIC BLOOD PRESSURE: 148 MMHG | DIASTOLIC BLOOD PRESSURE: 73 MMHG | WEIGHT: 107.2 LBS

## 2022-03-29 DIAGNOSIS — I48.0 PAROXYSMAL A-FIB (H): ICD-10-CM

## 2022-03-29 DIAGNOSIS — I48.0 PAROXYSMAL ATRIAL FIBRILLATION (H): Primary | ICD-10-CM

## 2022-03-29 DIAGNOSIS — I10 ESSENTIAL HYPERTENSION: ICD-10-CM

## 2022-03-29 PROCEDURE — 99213 OFFICE O/P EST LOW 20 MIN: CPT | Performed by: INTERNAL MEDICINE

## 2022-03-29 RX ORDER — LISINOPRIL 20 MG/1
20 TABLET ORAL DAILY
Qty: 90 TABLET | Refills: 3 | Status: SHIPPED | OUTPATIENT
Start: 2022-03-29 | End: 2022-05-31

## 2022-03-29 RX ORDER — FLECAINIDE ACETATE 100 MG/1
100 TABLET ORAL 2 TIMES DAILY
Qty: 180 TABLET | Refills: 3 | Status: SHIPPED | OUTPATIENT
Start: 2022-03-29 | End: 2023-01-01

## 2022-03-29 NOTE — LETTER
3/29/2022    Kash Eller MD  [address] [place] [id number]    RE: Gavin House       Dear Colleague,     I had the pleasure of seeing Gavin House in the [facility].  HPI and Plan:   See dictation        Orders Placed This Encounter   Procedures     Follow-Up with Cardiology - BIBI     EKG 12-lead complete w/read (Future)- to be scheduled       Orders Placed This Encounter   Medications     flecainide (TAMBOCOR) 100 MG tablet     Sig: Take 1 tablet (100 mg) by mouth 2 times daily     Dispense:  180 tablet     Refill:  3     lisinopril (ZESTRIL) 20 MG tablet     Sig: Take 1 tablet (20 mg) by mouth daily     Dispense:  90 tablet     Refill:  3       Medications Discontinued During This Encounter   Medication Reason     metroNIDAZOLE (NORITATE) 1 % external cream Medication Reconciliation Clean Up     flecainide (TAMBOCOR) 100 MG tablet Reorder     lisinopril (ZESTRIL) 20 MG tablet Reorder         Encounter Diagnoses   Name Primary?     Paroxysmal atrial fibrillation (H) Yes     Essential hypertension      Paroxysmal A-fib (H)        CURRENT MEDICATIONS:  Current Outpatient Medications   Medication Sig Dispense Refill     aspirin (ASA) 325 MG EC tablet Take 1 tablet (325 mg) by mouth daily 100 tablet 3     Ca Carbonate-Mag Hydroxide (ROLAIDS PO) Take by mouth as needed       EPINEPHrine (PRIMATENE MIST) 0.125 MG/ACT AERO Inhale 1 puff into the lungs daily as needed       flecainide (TAMBOCOR) 100 MG tablet Take 1 tablet (100 mg) by mouth 2 times daily 180 tablet 3     lisinopril (ZESTRIL) 20 MG tablet Take 1 tablet (20 mg) by mouth daily 90 tablet 3     tamsulosin (FLOMAX) 0.4 MG capsule Take 1 capsule (0.4 mg) by mouth daily 90 capsule 3       ALLERGIES     Allergies   Allergen Reactions     Penicillins        PAST MEDICAL HISTORY:  Past Medical History:   Diagnosis Date     Alcohol use      Allergic rhinitis, cause unspecified      Diverticulosis of colon     Diverticulitis  "2010     Mild intermittent asthma      Paroxysmal atrial fibrillation (H) 2016     PVC (premature ventricular contraction)     intermittent bigeminy     Rosacea      Scoliosis (and kyphoscoliosis), idiopathic        PAST SURGICAL HISTORY:  Past Surgical History:   Procedure Laterality Date     [facility] NONSPECIFIC PROCEDURE  1974    right inguinal herniorraphy     Z NONSPECIFIC PROCEDURE  age 16    L inguinal herniorraphy       FAMILY HISTORY:  Family History   Problem Relation Age of Onset     Colon Cancer Father          of this age 69     Alcoholism Father      Heart Disease Mother         \"enlarged heart\"; d: age 75     C.A.D. Brother          in his 80's      Alcoholism Brother          in his 60's        SOCIAL HISTORY:  Social History     Socioeconomic History     Marital status:      Spouse name: None     Number of children: 2     Years of education: None     Highest education level: None   Occupational History     Occupation: Retired [employer]     Employer: [employer]   Tobacco Use     Smoking status: Former Smoker     Packs/day: 1.00     Years: 23.00     Pack years: 23.00     Types: Cigarettes     Start date:      Quit date:      Years since quittin.2     Smokeless tobacco: Never Used   Substance and Sexual Activity     Alcohol use: Yes     Alcohol/week: 0.0 standard drinks     Comment: 2 vodka drinks a day ... or more     Drug use: No     Sexual activity: Yes     Partners: Female   Other Topics Concern     Parent/sibling w/ CABG, MI or angioplasty before 65F 55M? No   Social History Narrative    , has 2 children, is retired from his manufacturing job as of age 68.  He goes to the bar daily to socialize and drink -- does not want to stop drinking.  (last updated 10/27/2019)      Social Determinants of Health     Financial Resource Strain: Not on file   Food Insecurity: Not on file   Transportation Needs: Not on file   Physical Activity: Not on file " "  Stress: Not on file   Social Connections: Not on file   Intimate Partner Violence: Not on file   Housing Stability: Not on file       Review of Systems:  Skin:  Negative rash rosacea   Eyes:  Positive for glasses    ENT:  Negative nasal congestion    Respiratory:  Positive for dyspnea on exertion slight   Cardiovascular:  Negative Positive for palpitations 1-2X over last year  Gastroenterology: Positive for heartburn treatable with half of a Rolaid  Genitourinary:  Negative      Musculoskeletal:  Negative arthritis    Neurologic:  Negative      Psychiatric:  Negative excessive stress wife has cancer  Heme/Lymph/Imm:  Negative allergies    Endocrine:  Negative        Physical Exam:  Vitals: BP (!) 148/73   Pulse 81   Ht 1.727 m (5' 8\")   Wt 48.6 kg (107 lb 3.2 oz)   BMI 16.30 kg/m      Constitutional:  cooperative, alert and oriented, well developed, well nourished, in no acute distress thin      Skin:  warm and dry to the touch;no apparent skin lesions or masses noted          Head:  normocephalic, no masses or lesions        Eyes:  pupils equal and round;conjunctivae and lids unremarkable        Lymph:      ENT:  no pallor or cyanosis, dentition good        Neck:  carotid pulses are full and equal bilaterally, JVP normal, no carotid bruit        Respiratory:  normal breath sounds, clear to auscultation, normal A-P diameter, normal symmetry, normal respiratory excursion, no use of accessory muscles         Cardiac: regular rhythm;normal S1 and S2;no murmurs, gallops or rubs detected                not assessed this visit                                        GI:  abdomen soft        Extremities and Muscular Skeletal:  no deformities, clubbing, cyanosis, erythema observed;no edema              Neurological:  no gross motor deficits        Psych:  Alert and Oriented x 3        CC  No referring provider defined for this encounter.    Thank you for allowing me to participate in the care of your " patient.      Sincerely,     Lissette Verma MD     [facility]

## 2022-03-29 NOTE — PROGRESS NOTES
Service Date: 2022    HISTORY OF PRESENT ILLNESS:  I saw Mr. Del Valle for follow-up of atrial fibrillation.  He is a 76-year-old white male with a history of symptomatic paroxysmal atrial fibrillation.  He has been on flecainide 100 mg p.o. b.i.d.  Around 2019, he had a breakthrough atrial fibrillation when he got drunk with alcohol.  Since then, he has been doing well with no recurrent atrial fibrillation.  He has tolerated flecainide well without apparent side effects.  At the present time, he has no complaints.    PHYSICAL EXAMINATION:  VITAL SIGNS:  Blood pressure was 148/73, heart rate 81 beats per minute, body weight 107 pounds.  HEENT:  The eyes and ENT were unremarkable.  LUNGS:  Clear.  CARDIAC:  Rhythm was regular.  Heart sounds were normal with no murmur.  ABDOMEN:  No hepatomegaly.  EXTREMITIES:  There was no pedal edema.    ASSESSMENT AND RECOMMENDATIONS:  Mr. Del Valle is doing well symptomatically.  He tolerates the flecainide well with no recurrent atrial fibrillation.  He has a relatively low body weight, which has been the case since his high school.  His blood pressure is slightly elevated and I suggested that he checks a blood pressure when he visits with Dr. Eller next time before any increase of the blood pressure medications.  His next Cardiology follow-up is scheduled for 1 year.    cc:  [place] [address]    Lissette Verma MD        D: 2022   T: 2022   MT: al    Name:     ZOIE DEL VALLE  MRN:      [id number]        Account:      [id number]   :      1945           Service Date: 2022       Document: [id number]

## 2022-03-29 NOTE — PROGRESS NOTES
HPI and Plan:   See dictation        Orders Placed This Encounter   Procedures     Follow-Up with Cardiology - BIBI     EKG 12-lead complete w/read (Future)- to be scheduled       Orders Placed This Encounter   Medications     flecainide (TAMBOCOR) 100 MG tablet     Sig: Take 1 tablet (100 mg) by mouth 2 times daily     Dispense:  180 tablet     Refill:  3     lisinopril (ZESTRIL) 20 MG tablet     Sig: Take 1 tablet (20 mg) by mouth daily     Dispense:  90 tablet     Refill:  3       Medications Discontinued During This Encounter   Medication Reason     metroNIDAZOLE (NORITATE) 1 % external cream Medication Reconciliation Clean Up     flecainide (TAMBOCOR) 100 MG tablet Reorder     lisinopril (ZESTRIL) 20 MG tablet Reorder         Encounter Diagnoses   Name Primary?     Paroxysmal atrial fibrillation (H) Yes     Essential hypertension      Paroxysmal A-fib (H)        CURRENT MEDICATIONS:  Current Outpatient Medications   Medication Sig Dispense Refill     aspirin (ASA) 325 MG EC tablet Take 1 tablet (325 mg) by mouth daily 100 tablet 3     Ca Carbonate-Mag Hydroxide (ROLAIDS PO) Take by mouth as needed       EPINEPHrine (PRIMATENE MIST) 0.125 MG/ACT AERO Inhale 1 puff into the lungs daily as needed       flecainide (TAMBOCOR) 100 MG tablet Take 1 tablet (100 mg) by mouth 2 times daily 180 tablet 3     lisinopril (ZESTRIL) 20 MG tablet Take 1 tablet (20 mg) by mouth daily 90 tablet 3     tamsulosin (FLOMAX) 0.4 MG capsule Take 1 capsule (0.4 mg) by mouth daily 90 capsule 3       ALLERGIES     Allergies   Allergen Reactions     Penicillins        PAST MEDICAL HISTORY:  Past Medical History:   Diagnosis Date     Alcohol use      Allergic rhinitis, cause unspecified      Diverticulosis of colon     Diverticulitis 5/2010     Mild intermittent asthma      Paroxysmal atrial fibrillation (H) 05/09/2016     PVC (premature ventricular contraction)     intermittent bigeminy     Rosacea      Scoliosis (and kyphoscoliosis),  "idiopathic        PAST SURGICAL HISTORY:  Past Surgical History:   Procedure Laterality Date     [facility] NONSPECIFIC PROCEDURE  1974    right inguinal herniorraphy     Z NONSPECIFIC PROCEDURE  age 16    L inguinal herniorraphy       FAMILY HISTORY:  Family History   Problem Relation Age of Onset     Colon Cancer Father          of this age 69     Alcoholism Father      Heart Disease Mother         \"enlarged heart\"; d: age 75     C.A.D. Brother          in his 80's      Alcoholism Brother          in his 60's        SOCIAL HISTORY:  Social History     Socioeconomic History     Marital status:      Spouse name: None     Number of children: 2     Years of education: None     Highest education level: None   Occupational History     Occupation: [employer]     Employer: [employer]   Tobacco Use     Smoking status: Former Smoker     Packs/day: 1.00     Years: 23.00     Pack years: 23.00     Types: Cigarettes     Start date:      Quit date:      Years since quittin.2     Smokeless tobacco: Never Used   Substance and Sexual Activity     Alcohol use: Yes     Alcohol/week: 0.0 standard drinks     Comment: 2 vodka drinks a day ... or more     Drug use: No     Sexual activity: Yes     Partners: Female   Other Topics Concern     Parent/sibling w/ CABG, MI or angioplasty before 65F 55M? No   Social History Narrative    , has 2 children, is retired from his manufacturing job as of age 68.  He goes to the bar daily to socialize and drink -- does not want to stop drinking.  (last updated 10/27/2019)      Social Determinants of Health     Financial Resource Strain: Not on file   Food Insecurity: Not on file   Transportation Needs: Not on file   Physical Activity: Not on file   Stress: Not on file   Social Connections: Not on file   Intimate Partner Violence: Not on file   Housing Stability: Not on file       Review of Systems:  Skin:  Negative rash rosacea   Eyes:  Positive for " "glasses    ENT:  Negative nasal congestion    Respiratory:  Positive for dyspnea on exertion slight   Cardiovascular:  Negative Positive for palpitations 1-2X over last year  Gastroenterology: Positive for heartburn treatable with half of a Rolaid  Genitourinary:  Negative      Musculoskeletal:  Negative arthritis    Neurologic:  Negative      Psychiatric:  Negative excessive stress wife has cancer  Heme/Lymph/Imm:  Negative allergies    Endocrine:  Negative        Physical Exam:  Vitals: BP (!) 148/73   Pulse 81   Ht 1.727 m (5' 8\")   Wt 48.6 kg (107 lb 3.2 oz)   BMI 16.30 kg/m      Constitutional:  cooperative, alert and oriented, well developed, well nourished, in no acute distress thin      Skin:  warm and dry to the touch;no apparent skin lesions or masses noted          Head:  normocephalic, no masses or lesions        Eyes:  pupils equal and round;conjunctivae and lids unremarkable        Lymph:      ENT:  no pallor or cyanosis, dentition good        Neck:  carotid pulses are full and equal bilaterally, JVP normal, no carotid bruit        Respiratory:  normal breath sounds, clear to auscultation, normal A-P diameter, normal symmetry, normal respiratory excursion, no use of accessory muscles         Cardiac: regular rhythm;normal S1 and S2;no murmurs, gallops or rubs detected                not assessed this visit                                        GI:  abdomen soft        Extremities and Muscular Skeletal:  no deformities, clubbing, cyanosis, erythema observed;no edema              Neurological:  no gross motor deficits        Psych:  Alert and Oriented x 3        CC  No referring provider defined for this encounter.              "

## 2022-05-31 DIAGNOSIS — I10 ESSENTIAL HYPERTENSION: ICD-10-CM

## 2022-05-31 RX ORDER — LISINOPRIL 20 MG/1
20 TABLET ORAL DAILY
Qty: 90 TABLET | Refills: 2 | Status: SHIPPED | OUTPATIENT
Start: 2022-05-31 | End: 2023-01-01

## 2022-08-31 DIAGNOSIS — R33.8 BENIGN PROSTATIC HYPERPLASIA WITH URINARY RETENTION: ICD-10-CM

## 2022-08-31 DIAGNOSIS — N40.1 BENIGN PROSTATIC HYPERPLASIA WITH URINARY RETENTION: ICD-10-CM

## 2022-08-31 RX ORDER — TAMSULOSIN HYDROCHLORIDE 0.4 MG/1
0.4 CAPSULE ORAL DAILY
Qty: 90 CAPSULE | Refills: 1 | Status: SHIPPED | OUTPATIENT
Start: 2022-08-31 | End: 2022-11-09

## 2022-10-19 ENCOUNTER — TELEPHONE (OUTPATIENT)
Dept: UROLOGY | Facility: CLINIC | Age: 77
End: 2022-10-19

## 2022-10-19 ENCOUNTER — ALLIED HEALTH/NURSE VISIT (OUTPATIENT)
Dept: UROLOGY | Facility: CLINIC | Age: 77
End: 2022-10-19
Payer: COMMERCIAL

## 2022-10-19 DIAGNOSIS — Z79.2 PROPHYLACTIC ANTIBIOTIC: Primary | ICD-10-CM

## 2022-10-19 DIAGNOSIS — R33.9 URINARY RETENTION: ICD-10-CM

## 2022-10-19 DIAGNOSIS — R35.0 URINARY FREQUENCY: ICD-10-CM

## 2022-10-19 DIAGNOSIS — R35.0 URINARY FREQUENCY: Primary | ICD-10-CM

## 2022-10-19 LAB
ALBUMIN UR-MCNC: NEGATIVE MG/DL
APPEARANCE UR: CLEAR
BILIRUB UR QL STRIP: ABNORMAL
COLOR UR AUTO: YELLOW
GLUCOSE UR STRIP-MCNC: NEGATIVE MG/DL
HGB UR QL STRIP: ABNORMAL
KETONES UR STRIP-MCNC: 15 MG/DL
LEUKOCYTE ESTERASE UR QL STRIP: NEGATIVE
NITRATE UR QL: NEGATIVE
PH UR STRIP: 5.5 [PH] (ref 5–7)
RESIDUAL VOLUME (RV) (EXTERNAL): 880
SP GR UR STRIP: 1.02 (ref 1–1.03)
UROBILINOGEN UR STRIP-ACNC: 0.2 E.U./DL

## 2022-10-19 PROCEDURE — 81003 URINALYSIS AUTO W/O SCOPE: CPT | Mod: QW

## 2022-10-19 PROCEDURE — 87086 URINE CULTURE/COLONY COUNT: CPT

## 2022-10-19 PROCEDURE — 51702 INSERT TEMP BLADDER CATH: CPT

## 2022-10-19 PROCEDURE — 51798 US URINE CAPACITY MEASURE: CPT

## 2022-10-19 RX ORDER — CIPROFLOXACIN 500 MG/1
500 TABLET, FILM COATED ORAL ONCE
Qty: 1 TABLET | Refills: 0 | Status: SHIPPED | OUTPATIENT
Start: 2022-10-19 | End: 2022-10-19

## 2022-10-19 NOTE — PROGRESS NOTES
He has been urinary frequency  An voiding small amounts and uncomfortable. Symptoms have been going on for over   2  Weeks . He has Flomax but wife is not sure  If he has been taking it regularly. UAUC done  See results  . Bladder scan  For  880 ml . Advised patient o have arrington placed and he agreed .      Catheter insertion documentation on 10/19/2022:    Gavin House presents to the clinic for catheter insertion.  Reason for insertion: urinary retention  Order has been verified. yes  Catheter successfully inserted into the urethral meatus in the usual sterile fashion without immediate complication.  Type of catheter placed: 16 Turkmen Coude catheter  Urine is dark yellow  in color.  950 cc's of urine output returned.  Balloon was filled with 09cc's of normal saline.  Securement device placed for the catheter.  The patient tolerated the procedure and was instructed to watch for signs of infection and take one Cipro today  Stay on Flomax .  Keep upcoming  Appointment  With Dr Kowalski . Wife and patient taught  Catheter care .   One   Kimmy Wiggins LPN

## 2022-10-19 NOTE — TELEPHONE ENCOUNTER
[facility]    Phone Message    May a detailed message be left on voicemail: yes     Reason for Call: Symptoms or Concerns     If patient has red-flag symptoms, warm transfer to triage line    Current symptom or concern: For the last week, patient has been struggling with urinating. Feels like he has to go, but doesn't go. He has been drinking but not eating a lot. Please reach out. Thank you    Symptoms have been present for:  1 week(s)    Has patient previously been seen for this? No        Action Taken: Message routed to:  [facility] Uro    Travel Screening: Not Applicable

## 2022-10-21 LAB — BACTERIA UR CULT: NORMAL

## 2022-10-31 ENCOUNTER — TELEPHONE (OUTPATIENT)
Facility: CLINIC | Age: 77
End: 2022-10-31

## 2022-11-09 ENCOUNTER — OFFICE VISIT (OUTPATIENT)
Dept: UROLOGY | Facility: CLINIC | Age: 77
End: 2022-11-09
Payer: COMMERCIAL

## 2022-11-09 VITALS
OXYGEN SATURATION: 93 % | HEIGHT: 65 IN | SYSTOLIC BLOOD PRESSURE: 122 MMHG | DIASTOLIC BLOOD PRESSURE: 76 MMHG | WEIGHT: 117 LBS | BODY MASS INDEX: 19.49 KG/M2 | HEART RATE: 79 BPM

## 2022-11-09 DIAGNOSIS — R33.8 BENIGN PROSTATIC HYPERPLASIA WITH URINARY RETENTION: ICD-10-CM

## 2022-11-09 DIAGNOSIS — Z79.2 PROPHYLACTIC ANTIBIOTIC: Primary | ICD-10-CM

## 2022-11-09 DIAGNOSIS — N40.1 BENIGN PROSTATIC HYPERPLASIA WITH URINARY RETENTION: ICD-10-CM

## 2022-11-09 PROCEDURE — 99213 OFFICE O/P EST LOW 20 MIN: CPT | Performed by: STUDENT IN AN ORGANIZED HEALTH CARE EDUCATION/TRAINING PROGRAM

## 2022-11-09 RX ORDER — TAMSULOSIN HYDROCHLORIDE 0.4 MG/1
0.4 CAPSULE ORAL DAILY
Qty: 90 CAPSULE | Refills: 3 | Status: SHIPPED | OUTPATIENT
Start: 2022-11-09 | End: 2023-01-01

## 2022-11-09 RX ORDER — CIPROFLOXACIN 500 MG/1
500 TABLET, FILM COATED ORAL ONCE
Qty: 1 TABLET | Refills: 0 | Status: SHIPPED | OUTPATIENT
Start: 2022-11-09 | End: 2022-11-09

## 2022-11-09 ASSESSMENT — PAIN SCALES - GENERAL: PAINLEVEL: NO PAIN (0)

## 2022-11-09 NOTE — PATIENT INSTRUCTIONS
- continue tamsulosin 0.4 mg daily, make sure he doesn't miss any doses   - Constipation - recommend start miralax daily, to have 1-2 soft bowel movements daily

## 2022-11-09 NOTE — PROGRESS NOTES
"CHIEF COMPLAINT   Gavin House who is a 76 year old male returns today for follow-up of BPH with retention.      HPI   Gavin House is a 76 year old male who presents with a history of EtOH abuse, afib, HTN, PUD, presenting followed up for BPH with urinary retention.    Was seen about a year ago fro similar issue, bladder scan 815 ml, arrington placed, started on flmoax at that time; had obstructive prostsate on cysto.    Last month was having worsening urgency and frequency. He had missed two days of flomax in a row because he was drinking EtOH. Came to nurse visit and found to have 880 ml in bladder on bladder scan. Arrington placed with 950 ml in bladder    Collateral information from the wife: Apparently sometimes misses the tamsulosin doses when he drinks EtOH and doesn't eat. Patient denies this of course. Wife has been on him to take it regularly since this.    PHYSICAL EXAM  Patient is a 76 year old  male   Vitals: Blood pressure 122/76, pulse 79, height 1.651 m (5' 5\"), weight 53.1 kg (117 lb), SpO2 93 %.  Body mass index is 19.47 kg/m .  General Appearance Adult:   Alert, no acute distress, oriented  HENT: throat/mouth:normal, good dentition  Lungs: no respiratory distress, or pursed lip breathing  Heart: No obvious jugular venous distension present  Abdomen: nondistended  Musculoskeltal: extremities normal, no peripheral edema  Skin: no suspicious lesions or rashes  Neuro: Alert, oriented, speech and mentation normal  Psych: affect and mood normal  Gait: Normal  : 50 gram prostate, soft  Hard stool ball  Arrington in place clear yellow urine      ASSESSMENT and PLAN  76 year old male who presents with a history of EtOH abuse, afib, HTN, PUD, presenting followed up for BPH with urinary retention. This episode was precipitated by two days of drinking EtOH and missing his scheduled tamsulosin. His wife is trying to keep him on track with the daily meds. I offered a bladder outlet procedure vs. Staying on meds and " he wants to stay on medication for now. If he continues to have retention episodes due to missing meds, will have to strongly consider bladder outlet procedure    He has a hard stool ball in rectal vault. Needs to work on constipation as this can cause urinary retention also    - trial of void today. Assuming he passes with low PVR:  - continue tamsulosin 0.4 mg daily, make sure he doesn't miss any doses   - Constipation - recommend start miralax daily, to have 1-2 soft bowel movements daily  - return 1 year with UA, PVR, AUA symptom score    Cameron Kowalski MD   [facility]  [facility] Phone: [phone]

## 2022-11-09 NOTE — NURSING NOTE
Patient presents to clinic for a trial of void per MD order. Patient properly identified and procedure explained to patient. Patient's leg-bag emptied, clear-yellow urine drained from patient's catheter. Catheter was then detached from leg-bag and sterile cysto tubing inserted into catheter opening.   Via gravity 200cc's sterile water was gently instilled with brief pauses into bladder. Patient tolerated fairly well and then catheter balloon was completely deflated. Moore catheter was removed from bladder. Patient was able to successfully void 225 cc's following procedure. Patient was instructed to drink plenty of water, (At least 6-8 glasses daily). Patient instructed to call office during daytime hours, otherwise go to ER if unable to urinate/difficulty emptying. Patient verbalized understanding of this and will follow-up with MD as planned. Sent in one antibiotic to patient's preferred pharmacy and instructed patient to take today.     Erum Espinosa, CMA

## 2022-11-09 NOTE — NURSING NOTE
Chief Complaint   Patient presents with     Urinary Frequency     Patient would like cath out today   Kimmy Tenorio LPN

## 2022-11-09 NOTE — LETTER
"11/9/2022       RE: Gavin House  [address]  [place] [facility] [place] [address]     Dear Colleague,    Thank you for referring your patient, Gaivn House, to the [facility]. Please see a copy of my visit note below.    CHIEF COMPLAINT   Gavin House who is a 76 year old male returns today for follow-up of BPH with retention.      HPI   Gavin Huose is a 76 year old male who presents with a history of EtOH abuse, afib, HTN, PUD, presenting followed up for BPH with urinary retention.    Was seen about a year ago fro similar issue, bladder scan 815 ml, arrington placed, started on flmoax at that time; had obstructive prostsate on cysto.    Last month was having worsening urgency and frequency. He had missed two days of flomax in a row because he was drinking EtOH. Came to nurse visit and found to have 880 ml in bladder on bladder scan. Arrington placed with 950 ml in bladder    Collateral information from the wife: Apparently sometimes misses the tamsulosin doses when he drinks EtOH and doesn't eat. Patient denies this of course. Wife has been on him to take it regularly since this.    PHYSICAL EXAM  Patient is a 76 year old  male   Vitals: Blood pressure 122/76, pulse 79, height 1.651 m (5' 5\"), weight 53.1 kg (117 lb), SpO2 93 %.  Body mass index is 19.47 kg/m .  General Appearance Adult:   Alert, no acute distress, oriented  HENT: throat/mouth:normal, good dentition  Lungs: no respiratory distress, or pursed lip breathing  Heart: No obvious jugular venous distension present  Abdomen: nondistended  Musculoskeltal: extremities normal, no peripheral edema  Skin: no suspicious lesions or rashes  Neuro: Alert, oriented, speech and mentation normal  Psych: affect and mood normal  Gait: Normal  : 50 gram prostate, soft  Hard stool ball  Arrington in place clear yellow urine      ASSESSMENT and PLAN  76 year old male who presents with a " history of EtOH abuse, afib, HTN, PUD, presenting followed up for BPH with urinary retention. This episode was precipitated by two days of drinking EtOH and missing his scheduled tamsulosin. His wife is trying to keep him on track with the daily meds. I offered a bladder outlet procedure vs. Staying on meds and he wants to stay on medication for now. If he continues to have retention episodes due to missing meds, will have to strongly consider bladder outlet procedure    He has a hard stool ball in rectal vault. Needs to work on constipation as this can cause urinary retention also    - trial of void today. Assuming he passes with low PVR:  - continue tamsulosin 0.4 mg daily, make sure he doesn't miss any doses   - Constipation - recommend start miralax daily, to have 1-2 soft bowel movements daily  - return 1 year with UA, PVR, AUA symptom score    Cameron Kowalski MD   [facility]  [facility] Phone: [phone]

## 2023-01-01 ENCOUNTER — TELEPHONE (OUTPATIENT)
Dept: CARDIOLOGY | Facility: CLINIC | Age: 78
End: 2023-01-01
Payer: COMMERCIAL

## 2023-01-01 ENCOUNTER — LAB (OUTPATIENT)
Dept: LAB | Facility: CLINIC | Age: 78
End: 2023-01-01
Payer: COMMERCIAL

## 2023-01-01 ENCOUNTER — ANCILLARY PROCEDURE (OUTPATIENT)
Dept: GENERAL RADIOLOGY | Facility: CLINIC | Age: 78
End: 2023-01-01
Attending: INTERNAL MEDICINE
Payer: COMMERCIAL

## 2023-01-01 ENCOUNTER — APPOINTMENT (OUTPATIENT)
Dept: CARDIOLOGY | Facility: CLINIC | Age: 78
DRG: 287 | End: 2023-01-01
Attending: HOSPITALIST
Payer: COMMERCIAL

## 2023-01-01 ENCOUNTER — ANCILLARY PROCEDURE (OUTPATIENT)
Dept: CT IMAGING | Facility: CLINIC | Age: 78
End: 2023-01-01
Attending: INTERNAL MEDICINE
Payer: COMMERCIAL

## 2023-01-01 ENCOUNTER — OFFICE VISIT (OUTPATIENT)
Dept: CARDIOLOGY | Facility: CLINIC | Age: 78
End: 2023-01-01
Attending: INTERNAL MEDICINE
Payer: COMMERCIAL

## 2023-01-01 ENCOUNTER — OFFICE VISIT (OUTPATIENT)
Dept: URGENT CARE | Facility: URGENT CARE | Age: 78
End: 2023-01-01
Payer: COMMERCIAL

## 2023-01-01 ENCOUNTER — OFFICE VISIT (OUTPATIENT)
Dept: INTERNAL MEDICINE | Facility: CLINIC | Age: 78
End: 2023-01-01
Payer: COMMERCIAL

## 2023-01-01 ENCOUNTER — HOSPITAL ENCOUNTER (INPATIENT)
Facility: CLINIC | Age: 78
LOS: 2 days | Discharge: HOME | DRG: 287 | End: 2023-06-29
Attending: EMERGENCY MEDICINE | Admitting: HOSPITALIST
Payer: COMMERCIAL

## 2023-01-01 ENCOUNTER — APPOINTMENT (OUTPATIENT)
Dept: GENERAL RADIOLOGY | Facility: CLINIC | Age: 78
DRG: 287 | End: 2023-01-01
Attending: EMERGENCY MEDICINE
Payer: COMMERCIAL

## 2023-01-01 ENCOUNTER — PATIENT OUTREACH (OUTPATIENT)
Dept: CARE COORDINATION | Facility: CLINIC | Age: 78
End: 2023-01-01
Payer: COMMERCIAL

## 2023-01-01 ENCOUNTER — OFFICE VISIT (OUTPATIENT)
Dept: UROLOGY | Facility: CLINIC | Age: 78
End: 2023-01-01
Payer: COMMERCIAL

## 2023-01-01 ENCOUNTER — TRANSFERRED RECORDS (OUTPATIENT)
Dept: HEALTH INFORMATION MANAGEMENT | Facility: CLINIC | Age: 78
End: 2023-01-01

## 2023-01-01 ENCOUNTER — TELEPHONE (OUTPATIENT)
Dept: ANTICOAGULATION | Facility: CLINIC | Age: 78
End: 2023-01-01
Payer: COMMERCIAL

## 2023-01-01 ENCOUNTER — NURSE TRIAGE (OUTPATIENT)
Dept: INTERNAL MEDICINE | Facility: CLINIC | Age: 78
End: 2023-01-01
Payer: COMMERCIAL

## 2023-01-01 ENCOUNTER — OFFICE VISIT (OUTPATIENT)
Dept: CARDIOLOGY | Facility: CLINIC | Age: 78
End: 2023-01-01
Payer: COMMERCIAL

## 2023-01-01 ENCOUNTER — HOSPITAL ENCOUNTER (OUTPATIENT)
Dept: CARDIAC REHAB | Facility: CLINIC | Age: 78
Discharge: HOME | End: 2023-09-27
Attending: INTERNAL MEDICINE
Payer: COMMERCIAL

## 2023-01-01 VITALS
WEIGHT: 94.9 LBS | BODY MASS INDEX: 14.43 KG/M2 | RESPIRATION RATE: 16 BRPM | HEART RATE: 57 BPM | DIASTOLIC BLOOD PRESSURE: 56 MMHG | SYSTOLIC BLOOD PRESSURE: 94 MMHG | TEMPERATURE: 97 F | OXYGEN SATURATION: 97 %

## 2023-01-01 VITALS
BODY MASS INDEX: 17.47 KG/M2 | OXYGEN SATURATION: 98 % | RESPIRATION RATE: 24 BRPM | DIASTOLIC BLOOD PRESSURE: 63 MMHG | WEIGHT: 105 LBS | SYSTOLIC BLOOD PRESSURE: 93 MMHG | HEART RATE: 144 BPM | TEMPERATURE: 98.9 F

## 2023-01-01 VITALS
BODY MASS INDEX: 16.24 KG/M2 | OXYGEN SATURATION: 99 % | WEIGHT: 103.7 LBS | SYSTOLIC BLOOD PRESSURE: 124 MMHG | HEART RATE: 53 BPM | TEMPERATURE: 98 F | DIASTOLIC BLOOD PRESSURE: 70 MMHG

## 2023-01-01 VITALS
DIASTOLIC BLOOD PRESSURE: 90 MMHG | BODY MASS INDEX: 17.83 KG/M2 | HEART RATE: 55 BPM | OXYGEN SATURATION: 99 % | SYSTOLIC BLOOD PRESSURE: 170 MMHG | HEIGHT: 65 IN | RESPIRATION RATE: 17 BRPM | WEIGHT: 107 LBS

## 2023-01-01 VITALS
HEIGHT: 67 IN | RESPIRATION RATE: 17 BRPM | WEIGHT: 104 LBS | SYSTOLIC BLOOD PRESSURE: 122 MMHG | DIASTOLIC BLOOD PRESSURE: 59 MMHG | HEART RATE: 68 BPM | BODY MASS INDEX: 16.32 KG/M2 | OXYGEN SATURATION: 97 %

## 2023-01-01 VITALS
DIASTOLIC BLOOD PRESSURE: 53 MMHG | HEIGHT: 68 IN | SYSTOLIC BLOOD PRESSURE: 94 MMHG | HEART RATE: 61 BPM | OXYGEN SATURATION: 100 % | WEIGHT: 105 LBS | BODY MASS INDEX: 15.91 KG/M2

## 2023-01-01 VITALS
HEART RATE: 68 BPM | HEIGHT: 68 IN | TEMPERATURE: 97.6 F | WEIGHT: 102.9 LBS | RESPIRATION RATE: 16 BRPM | OXYGEN SATURATION: 99 % | SYSTOLIC BLOOD PRESSURE: 107 MMHG | DIASTOLIC BLOOD PRESSURE: 83 MMHG | BODY MASS INDEX: 15.59 KG/M2

## 2023-01-01 VITALS
WEIGHT: 105 LBS | HEART RATE: 69 BPM | BODY MASS INDEX: 17.49 KG/M2 | HEIGHT: 65 IN | OXYGEN SATURATION: 98 % | SYSTOLIC BLOOD PRESSURE: 160 MMHG | DIASTOLIC BLOOD PRESSURE: 72 MMHG

## 2023-01-01 VITALS
DIASTOLIC BLOOD PRESSURE: 55 MMHG | WEIGHT: 105.8 LBS | BODY MASS INDEX: 17.63 KG/M2 | HEART RATE: 55 BPM | SYSTOLIC BLOOD PRESSURE: 106 MMHG | HEIGHT: 65 IN

## 2023-01-01 DIAGNOSIS — I10 ESSENTIAL HYPERTENSION: ICD-10-CM

## 2023-01-01 DIAGNOSIS — R51.9 NONINTRACTABLE HEADACHE, UNSPECIFIED CHRONICITY PATTERN, UNSPECIFIED HEADACHE TYPE: ICD-10-CM

## 2023-01-01 DIAGNOSIS — I48.0 PAROXYSMAL A-FIB (H): ICD-10-CM

## 2023-01-01 DIAGNOSIS — Z86.79 HISTORY OF ATRIAL FIBRILLATION: ICD-10-CM

## 2023-01-01 DIAGNOSIS — J90 PLEURAL EFFUSION: ICD-10-CM

## 2023-01-01 DIAGNOSIS — F10.90 ALCOHOL USE DISORDER: ICD-10-CM

## 2023-01-01 DIAGNOSIS — R63.4 WEIGHT LOSS: ICD-10-CM

## 2023-01-01 DIAGNOSIS — R33.8 BENIGN PROSTATIC HYPERPLASIA WITH URINARY RETENTION: Primary | ICD-10-CM

## 2023-01-01 DIAGNOSIS — Z13.6 CARDIOVASCULAR SCREENING; LDL GOAL LESS THAN 160: ICD-10-CM

## 2023-01-01 DIAGNOSIS — E87.6 HYPOKALEMIA: ICD-10-CM

## 2023-01-01 DIAGNOSIS — I48.0 PAROXYSMAL A-FIB (H): Primary | ICD-10-CM

## 2023-01-01 DIAGNOSIS — I25.10 CORONARY ARTERY DISEASE INVOLVING NATIVE CORONARY ARTERY OF NATIVE HEART WITHOUT ANGINA PECTORIS: ICD-10-CM

## 2023-01-01 DIAGNOSIS — I48.0 PAROXYSMAL ATRIAL FIBRILLATION (H): ICD-10-CM

## 2023-01-01 DIAGNOSIS — R07.9 CHEST PAIN: ICD-10-CM

## 2023-01-01 DIAGNOSIS — I48.0 PAROXYSMAL ATRIAL FIBRILLATION (H): Primary | ICD-10-CM

## 2023-01-01 DIAGNOSIS — R82.4 KETONURIA: ICD-10-CM

## 2023-01-01 DIAGNOSIS — I10 ESSENTIAL HYPERTENSION: Primary | ICD-10-CM

## 2023-01-01 DIAGNOSIS — F03.A3 MILD DEMENTIA WITH MOOD DISTURBANCE, UNSPECIFIED DEMENTIA TYPE (H): ICD-10-CM

## 2023-01-01 DIAGNOSIS — R80.9 PROTEINURIA, UNSPECIFIED TYPE: ICD-10-CM

## 2023-01-01 DIAGNOSIS — Z00.00 MEDICARE ANNUAL WELLNESS VISIT, SUBSEQUENT: Primary | ICD-10-CM

## 2023-01-01 DIAGNOSIS — N40.1 BENIGN PROSTATIC HYPERPLASIA WITH URINARY RETENTION: Primary | ICD-10-CM

## 2023-01-01 DIAGNOSIS — R07.89 TIGHTNESS IN CHEST: Primary | ICD-10-CM

## 2023-01-01 DIAGNOSIS — R00.0 TACHYCARDIA: ICD-10-CM

## 2023-01-01 DIAGNOSIS — R63.4 WEIGHT LOSS: Primary | ICD-10-CM

## 2023-01-01 DIAGNOSIS — R00.0 WIDE-COMPLEX TACHYCARDIA: ICD-10-CM

## 2023-01-01 DIAGNOSIS — Z86.73 HISTORY OF STROKE: ICD-10-CM

## 2023-01-01 DIAGNOSIS — R07.9 CHEST PAIN, UNSPECIFIED TYPE: ICD-10-CM

## 2023-01-01 DIAGNOSIS — M54.2 NECK PAIN: ICD-10-CM

## 2023-01-01 DIAGNOSIS — R82.2 BILIRUBINURIA: ICD-10-CM

## 2023-01-01 DIAGNOSIS — Z13.6 CARDIOVASCULAR SCREENING; LDL GOAL LESS THAN 160: Primary | ICD-10-CM

## 2023-01-01 LAB
ALBUMIN SERPL BCG-MCNC: 3.5 G/DL (ref 3.5–5.2)
ALBUMIN SERPL BCG-MCNC: 4.3 G/DL (ref 3.5–5.2)
ALBUMIN SERPL-MCNC: 3.5 G/DL (ref 3.4–5)
ALBUMIN UR-MCNC: ABNORMAL MG/DL
ALBUMIN UR-MCNC: NEGATIVE MG/DL
ALP SERPL-CCNC: 100 U/L (ref 40–129)
ALP SERPL-CCNC: 105 U/L (ref 40–150)
ALP SERPL-CCNC: 109 U/L (ref 40–150)
ALT SERPL W P-5'-P-CCNC: 369 U/L (ref 0–70)
ALT SERPL W P-5'-P-CCNC: 52 U/L (ref 0–70)
ALT SERPL W P-5'-P-CCNC: 6 U/L (ref 0–70)
ALT SERPL W P-5'-P-CCNC: 6 U/L (ref 10–50)
ANION GAP SERPL CALCULATED.3IONS-SCNC: 10 MMOL/L (ref 7–15)
ANION GAP SERPL CALCULATED.3IONS-SCNC: 11 MMOL/L (ref 7–15)
ANION GAP SERPL CALCULATED.3IONS-SCNC: 12 MMOL/L (ref 3–14)
ANION GAP SERPL CALCULATED.3IONS-SCNC: 12 MMOL/L (ref 7–15)
ANION GAP SERPL CALCULATED.3IONS-SCNC: 9 MMOL/L (ref 7–15)
ANION GAP SERPL CALCULATED.3IONS-SCNC: 9 MMOL/L (ref 7–15)
APPEARANCE UR: CLEAR
APPEARANCE UR: CLEAR
APTT PPP: 115 SECONDS (ref 22–38)
APTT PPP: 40 SECONDS (ref 22–38)
APTT PPP: 43 SECONDS (ref 22–38)
APTT PPP: 47 SECONDS (ref 22–38)
APTT PPP: 82 SECONDS (ref 22–38)
AST SERPL W P-5'-P-CCNC: 17 U/L (ref 10–50)
AST SERPL W P-5'-P-CCNC: 386 U/L (ref 0–45)
AST SERPL W P-5'-P-CCNC: 58 U/L (ref 0–45)
ATRIAL RATE - MUSE: 147 BPM
ATRIAL RATE - MUSE: 72 BPM
ATRIAL RATE - MUSE: 84 BPM
BACTERIA #/AREA URNS HPF: ABNORMAL /HPF
BASOPHILS # BLD AUTO: 0.1 10E3/UL (ref 0–0.2)
BASOPHILS # BLD AUTO: 0.1 10E3/UL (ref 0–0.2)
BASOPHILS NFR BLD AUTO: 1 %
BASOPHILS NFR BLD AUTO: 1 %
BILIRUB DIRECT SERPL-MCNC: <0.2 MG/DL (ref 0–0.3)
BILIRUB DIRECT SERPL-MCNC: <0.2 MG/DL (ref 0–0.3)
BILIRUB SERPL-MCNC: 0.5 MG/DL
BILIRUB SERPL-MCNC: 0.7 MG/DL
BILIRUB SERPL-MCNC: 0.8 MG/DL (ref 0.2–1.3)
BILIRUB UR QL STRIP: ABNORMAL
BILIRUB UR QL STRIP: ABNORMAL
BUN SERPL-MCNC: 10.5 MG/DL (ref 8–23)
BUN SERPL-MCNC: 11.4 MG/DL (ref 8–23)
BUN SERPL-MCNC: 17.2 MG/DL (ref 8–23)
BUN SERPL-MCNC: 18.9 MG/DL (ref 8–23)
BUN SERPL-MCNC: 18.9 MG/DL (ref 8–23)
BUN SERPL-MCNC: 46.9 MG/DL (ref 8–23)
BUN SERPL-MCNC: 5.8 MG/DL (ref 8–23)
BUN SERPL-MCNC: 56 MG/DL (ref 7–30)
BUN SERPL-MCNC: 7.3 MG/DL (ref 8–23)
CALCIUM SERPL-MCNC: 8.4 MG/DL (ref 8.8–10.2)
CALCIUM SERPL-MCNC: 8.6 MG/DL (ref 8.8–10.2)
CALCIUM SERPL-MCNC: 8.8 MG/DL (ref 8.8–10.2)
CALCIUM SERPL-MCNC: 8.8 MG/DL (ref 8.8–10.2)
CALCIUM SERPL-MCNC: 8.9 MG/DL (ref 8.8–10.2)
CALCIUM SERPL-MCNC: 8.9 MG/DL (ref 8.8–10.2)
CALCIUM SERPL-MCNC: 9 MG/DL (ref 8.8–10.2)
CALCIUM SERPL-MCNC: 9.2 MG/DL (ref 8.8–10.2)
CALCIUM SERPL-MCNC: 9.4 MG/DL (ref 8.5–10.1)
CHLORIDE BLD-SCNC: 101 MMOL/L (ref 94–109)
CHLORIDE SERPL-SCNC: 101 MMOL/L (ref 98–107)
CHLORIDE SERPL-SCNC: 102 MMOL/L (ref 98–107)
CHLORIDE SERPL-SCNC: 103 MMOL/L (ref 98–107)
CHLORIDE SERPL-SCNC: 103 MMOL/L (ref 98–107)
CHLORIDE SERPL-SCNC: 104 MMOL/L (ref 98–107)
CHLORIDE SERPL-SCNC: 98 MMOL/L (ref 98–107)
CHOLEST SERPL-MCNC: 102 MG/DL
CHOLEST SERPL-MCNC: 150 MG/DL
CO2 SERPL-SCNC: 27 MMOL/L (ref 20–32)
COLOR UR AUTO: YELLOW
COLOR UR AUTO: YELLOW
CREAT SERPL-MCNC: 0.88 MG/DL (ref 0.67–1.17)
CREAT SERPL-MCNC: 0.9 MG/DL (ref 0.67–1.17)
CREAT SERPL-MCNC: 1.04 MG/DL (ref 0.67–1.17)
CREAT SERPL-MCNC: 1.11 MG/DL (ref 0.67–1.17)
CREAT SERPL-MCNC: 1.24 MG/DL (ref 0.67–1.17)
CREAT SERPL-MCNC: 1.53 MG/DL (ref 0.67–1.17)
CREAT SERPL-MCNC: 1.54 MG/DL (ref 0.67–1.17)
CREAT SERPL-MCNC: 1.96 MG/DL (ref 0.67–1.17)
CREAT SERPL-MCNC: 2 MG/DL (ref 0.66–1.25)
DEPRECATED HCO3 PLAS-SCNC: 23 MMOL/L (ref 22–29)
DEPRECATED HCO3 PLAS-SCNC: 24 MMOL/L (ref 22–29)
DEPRECATED HCO3 PLAS-SCNC: 24 MMOL/L (ref 22–29)
DEPRECATED HCO3 PLAS-SCNC: 27 MMOL/L (ref 22–29)
DEPRECATED HCO3 PLAS-SCNC: 28 MMOL/L (ref 22–29)
DEPRECATED HCO3 PLAS-SCNC: 28 MMOL/L (ref 22–29)
DEPRECATED HCO3 PLAS-SCNC: 29 MMOL/L (ref 22–29)
DEPRECATED HCO3 PLAS-SCNC: 30 MMOL/L (ref 22–29)
DIASTOLIC BLOOD PRESSURE - MUSE: NORMAL MMHG
DLCOUNC-%PRED-PRE: 67 %
DLCOUNC-PRE: 14.98 ML/MIN/MMHG
DLCOUNC-PRED: 22.05 ML/MIN/MMHG
EGFRCR SERPLBLD CKD-EPI 2021: 34 ML/MIN/1.73M2
EGFRCR SERPLBLD CKD-EPI 2021: 35 ML/MIN/1.73M2
EGFRCR SERPLBLD CKD-EPI 2021: 46 ML/MIN/1.73M2
EOSINOPHIL # BLD AUTO: 0.2 10E3/UL (ref 0–0.7)
EOSINOPHIL # BLD AUTO: 0.2 10E3/UL (ref 0–0.7)
EOSINOPHIL NFR BLD AUTO: 2 %
EOSINOPHIL NFR BLD AUTO: 3 %
ERV-%PRED-PRE: 54 %
ERV-PRE: 0.63 L
ERV-PRED: 1.16 L
ERYTHROCYTE [DISTWIDTH] IN BLOOD BY AUTOMATED COUNT: 11.6 % (ref 10–15)
ERYTHROCYTE [DISTWIDTH] IN BLOOD BY AUTOMATED COUNT: 11.6 % (ref 10–15)
ERYTHROCYTE [DISTWIDTH] IN BLOOD BY AUTOMATED COUNT: 11.7 % (ref 10–15)
ERYTHROCYTE [DISTWIDTH] IN BLOOD BY AUTOMATED COUNT: 11.7 % (ref 10–15)
ERYTHROCYTE [DISTWIDTH] IN BLOOD BY AUTOMATED COUNT: 11.8 % (ref 10–15)
ERYTHROCYTE [DISTWIDTH] IN BLOOD BY AUTOMATED COUNT: 11.9 % (ref 10–15)
ETHANOL SERPL-MCNC: <0.01 G/DL
EXPTIME-PRE: 6.87 SEC
FEF2575-%PRED-PRE: 60 %
FEF2575-PRE: 1.11 L/SEC
FEF2575-PRED: 1.85 L/SEC
FEFMAX-%PRED-PRE: 62 %
FEFMAX-PRE: 4.24 L/SEC
FEFMAX-PRED: 6.74 L/SEC
FEV1-%PRED-PRE: 56 %
FEV1-PRE: 1.39 L
FEV1FEV6-PRE: 77 %
FEV1FEV6-PRED: 77 %
FEV1FVC-PRE: 77 %
FEV1FVC-PRED: 77 %
FEV1SVC-PRE: 79 %
FEV1SVC-PRED: 64 %
FIFMAX-PRE: 1.71 L/SEC
FOLATE SERPL-MCNC: 11.4 NG/ML (ref 4.6–34.8)
FRCPLETH-%PRED-PRE: 93 %
FRCPLETH-PRE: 3.17 L
FRCPLETH-PRED: 3.38 L
FVC-%PRED-PRE: 56 %
FVC-PRE: 1.79 L
FVC-PRED: 3.2 L
GFR SERPL CREATININE-BSD FRML MDRD: 47 ML/MIN/1.73M2
GFR SERPL CREATININE-BSD FRML MDRD: 60 ML/MIN/1.73M2
GFR SERPL CREATININE-BSD FRML MDRD: 69 ML/MIN/1.73M2
GFR SERPL CREATININE-BSD FRML MDRD: 74 ML/MIN/1.73M2
GFR SERPL CREATININE-BSD FRML MDRD: 89 ML/MIN/1.73M2
GFR SERPL CREATININE-BSD FRML MDRD: 89 ML/MIN/1.73M2
GLUCOSE BLD-MCNC: 99 MG/DL (ref 70–99)
GLUCOSE SERPL-MCNC: 101 MG/DL (ref 70–99)
GLUCOSE SERPL-MCNC: 102 MG/DL (ref 70–99)
GLUCOSE SERPL-MCNC: 144 MG/DL (ref 70–99)
GLUCOSE SERPL-MCNC: 82 MG/DL (ref 70–99)
GLUCOSE SERPL-MCNC: 94 MG/DL (ref 70–99)
GLUCOSE SERPL-MCNC: 94 MG/DL (ref 70–99)
GLUCOSE SERPL-MCNC: 96 MG/DL (ref 70–99)
GLUCOSE SERPL-MCNC: 98 MG/DL (ref 70–99)
GLUCOSE UR STRIP-MCNC: NEGATIVE MG/DL
GLUCOSE UR STRIP-MCNC: NEGATIVE MG/DL
HCT VFR BLD AUTO: 37.6 % (ref 40–53)
HCT VFR BLD AUTO: 39.7 % (ref 40–53)
HCT VFR BLD AUTO: 39.9 % (ref 40–53)
HCT VFR BLD AUTO: 41.3 % (ref 40–53)
HCT VFR BLD AUTO: 41.7 % (ref 40–53)
HCT VFR BLD AUTO: 43.2 % (ref 40–53)
HDLC SERPL-MCNC: 41 MG/DL
HDLC SERPL-MCNC: 46 MG/DL
HGB BLD-MCNC: 13 G/DL (ref 13.3–17.7)
HGB BLD-MCNC: 13.3 G/DL (ref 13.3–17.7)
HGB BLD-MCNC: 13.4 G/DL (ref 13.3–17.7)
HGB BLD-MCNC: 13.6 G/DL (ref 13.3–17.7)
HGB BLD-MCNC: 14 G/DL (ref 13.3–17.7)
HGB BLD-MCNC: 14.5 G/DL (ref 13.3–17.7)
HGB UR QL STRIP: NEGATIVE
HGB UR QL STRIP: NEGATIVE
HOLD SPECIMEN: NORMAL
HOLD SPECIMEN: NORMAL
IC-%PRED-PRE: 48 %
IC-PRE: 1.12 L
IC-PRED: 2.31 L
IMM GRANULOCYTES # BLD: 0 10E3/UL
IMM GRANULOCYTES # BLD: 0 10E3/UL
IMM GRANULOCYTES NFR BLD: 0 %
IMM GRANULOCYTES NFR BLD: 0 %
INTERPRETATION ECG - MUSE: NORMAL
KETONES UR STRIP-MCNC: 15 MG/DL
KETONES UR STRIP-MCNC: ABNORMAL MG/DL
LDLC SERPL CALC-MCNC: 44 MG/DL
LDLC SERPL CALC-MCNC: 88 MG/DL
LEUKOCYTE ESTERASE UR QL STRIP: NEGATIVE
LEUKOCYTE ESTERASE UR QL STRIP: NEGATIVE
LVEF ECHO: NORMAL
LYMPHOCYTES # BLD AUTO: 0.9 10E3/UL (ref 0.8–5.3)
LYMPHOCYTES # BLD AUTO: 0.9 10E3/UL (ref 0.8–5.3)
LYMPHOCYTES NFR BLD AUTO: 13 %
LYMPHOCYTES NFR BLD AUTO: 19 %
MAGNESIUM SERPL-MCNC: 2 MG/DL (ref 1.7–2.3)
MAGNESIUM SERPL-MCNC: 2 MG/DL (ref 1.7–2.3)
MAGNESIUM SERPL-MCNC: 2.1 MG/DL (ref 1.7–2.3)
MCH RBC QN AUTO: 31.1 PG (ref 26.5–33)
MCH RBC QN AUTO: 31.9 PG (ref 26.5–33)
MCH RBC QN AUTO: 32.2 PG (ref 26.5–33)
MCH RBC QN AUTO: 32.2 PG (ref 26.5–33)
MCH RBC QN AUTO: 32.6 PG (ref 26.5–33)
MCH RBC QN AUTO: 32.8 PG (ref 26.5–33)
MCHC RBC AUTO-ENTMCNC: 32.2 G/DL (ref 31.5–36.5)
MCHC RBC AUTO-ENTMCNC: 33.6 G/DL (ref 31.5–36.5)
MCHC RBC AUTO-ENTMCNC: 33.6 G/DL (ref 31.5–36.5)
MCHC RBC AUTO-ENTMCNC: 33.8 G/DL (ref 31.5–36.5)
MCHC RBC AUTO-ENTMCNC: 34.1 G/DL (ref 31.5–36.5)
MCHC RBC AUTO-ENTMCNC: 34.6 G/DL (ref 31.5–36.5)
MCV RBC AUTO: 94 FL (ref 78–100)
MCV RBC AUTO: 95 FL (ref 78–100)
MCV RBC AUTO: 95 FL (ref 78–100)
MCV RBC AUTO: 96 FL (ref 78–100)
MCV RBC AUTO: 97 FL (ref 78–100)
MCV RBC AUTO: 97 FL (ref 78–100)
MONOCYTES # BLD AUTO: 0.6 10E3/UL (ref 0–1.3)
MONOCYTES # BLD AUTO: 0.7 10E3/UL (ref 0–1.3)
MONOCYTES NFR BLD AUTO: 10 %
MONOCYTES NFR BLD AUTO: 14 %
NEUTROPHILS # BLD AUTO: 2.9 10E3/UL (ref 1.6–8.3)
NEUTROPHILS # BLD AUTO: 5 10E3/UL (ref 1.6–8.3)
NEUTROPHILS NFR BLD AUTO: 63 %
NEUTROPHILS NFR BLD AUTO: 74 %
NITRATE UR QL: NEGATIVE
NITRATE UR QL: NEGATIVE
NONHDLC SERPL-MCNC: 104 MG/DL
NONHDLC SERPL-MCNC: 61 MG/DL
NRBC # BLD AUTO: 0 10E3/UL
NRBC # BLD AUTO: 0 10E3/UL
NRBC BLD AUTO-RTO: 0 /100
NRBC BLD AUTO-RTO: 0 /100
P AXIS - MUSE: 38 DEGREES
P AXIS - MUSE: 67 DEGREES
P AXIS - MUSE: 75 DEGREES
PH UR STRIP: 5.5 [PH] (ref 5–7)
PH UR STRIP: 5.5 [PH] (ref 5–7)
PHOSPHATE SERPL-MCNC: 3.5 MG/DL (ref 2.5–4.5)
PLATELET # BLD AUTO: 217 10E3/UL (ref 150–450)
PLATELET # BLD AUTO: 222 10E3/UL (ref 150–450)
PLATELET # BLD AUTO: 226 10E3/UL (ref 150–450)
PLATELET # BLD AUTO: 274 10E3/UL (ref 150–450)
PLATELET # BLD AUTO: 277 10E3/UL (ref 150–450)
PLATELET # BLD AUTO: 308 10E3/UL (ref 150–450)
POTASSIUM BLD-SCNC: 4.5 MMOL/L (ref 3.4–5.3)
POTASSIUM SERPL-SCNC: 3.3 MMOL/L (ref 3.4–5.3)
POTASSIUM SERPL-SCNC: 3.8 MMOL/L (ref 3.4–5.3)
POTASSIUM SERPL-SCNC: 3.8 MMOL/L (ref 3.4–5.3)
POTASSIUM SERPL-SCNC: 3.9 MMOL/L (ref 3.4–5.3)
POTASSIUM SERPL-SCNC: 4.1 MMOL/L (ref 3.4–5.3)
POTASSIUM SERPL-SCNC: 4.2 MMOL/L (ref 3.4–5.3)
POTASSIUM SERPL-SCNC: 4.2 MMOL/L (ref 3.4–5.3)
POTASSIUM SERPL-SCNC: 4.8 MMOL/L (ref 3.4–5.3)
PR INTERVAL - MUSE: 120 MS
PR INTERVAL - MUSE: 202 MS
PR INTERVAL - MUSE: 218 MS
PROT SERPL-MCNC: 7 G/DL (ref 6.4–8.3)
PROT SERPL-MCNC: 7.6 G/DL (ref 6.4–8.3)
PROT SERPL-MCNC: 7.8 G/DL (ref 6.8–8.8)
QRS DURATION - MUSE: 168 MS
QRS DURATION - MUSE: 90 MS
QRS DURATION - MUSE: 90 MS
QT - MUSE: 308 MS
QT - MUSE: 402 MS
QT - MUSE: 404 MS
QTC - MUSE: 440 MS
QTC - MUSE: 477 MS
QTC - MUSE: 482 MS
R AXIS - MUSE: -11 DEGREES
R AXIS - MUSE: 82 DEGREES
R AXIS - MUSE: 83 DEGREES
RBC # BLD AUTO: 3.96 10E6/UL (ref 4.4–5.9)
RBC # BLD AUTO: 4.16 10E6/UL (ref 4.4–5.9)
RBC # BLD AUTO: 4.26 10E6/UL (ref 4.4–5.9)
RBC # BLD AUTO: 4.27 10E6/UL (ref 4.4–5.9)
RBC # BLD AUTO: 4.3 10E6/UL (ref 4.4–5.9)
RBC # BLD AUTO: 4.5 10E6/UL (ref 4.4–5.9)
RBC #/AREA URNS AUTO: ABNORMAL /HPF
RVPLETH-%PRED-PRE: 106 %
RVPLETH-PRE: 2.53 L
RVPLETH-PRED: 2.37 L
SODIUM SERPL-SCNC: 135 MMOL/L (ref 136–145)
SODIUM SERPL-SCNC: 135 MMOL/L (ref 136–145)
SODIUM SERPL-SCNC: 137 MMOL/L (ref 135–145)
SODIUM SERPL-SCNC: 137 MMOL/L (ref 136–145)
SODIUM SERPL-SCNC: 139 MMOL/L (ref 136–145)
SODIUM SERPL-SCNC: 140 MMOL/L (ref 135–145)
SODIUM SERPL-SCNC: 140 MMOL/L (ref 135–145)
SODIUM SERPL-SCNC: 141 MMOL/L (ref 136–145)
SODIUM SERPL-SCNC: 143 MMOL/L (ref 136–145)
SP GR UR STRIP: 1.02 (ref 1–1.03)
SP GR UR STRIP: >=1.03 (ref 1–1.03)
SQUAMOUS #/AREA URNS AUTO: ABNORMAL /LPF
SYSTOLIC BLOOD PRESSURE - MUSE: NORMAL MMHG
T AXIS - MUSE: 255 DEGREES
T AXIS - MUSE: 55 DEGREES
T AXIS - MUSE: 72 DEGREES
TLCPLETH-%PRED-PRE: 69 %
TLCPLETH-PRE: 4.29 L
TLCPLETH-PRED: 6.19 L
TRIGL SERPL-MCNC: 82 MG/DL
TRIGL SERPL-MCNC: 86 MG/DL
TROPONIN T SERPL HS-MCNC: 131 NG/L
TROPONIN T SERPL HS-MCNC: 31 NG/L
TROPONIN T SERPL HS-MCNC: 90 NG/L
TSH SERPL DL<=0.005 MIU/L-ACNC: 2.03 UIU/ML (ref 0.3–4.2)
TSH SERPL DL<=0.005 MIU/L-ACNC: 2.64 UIU/ML (ref 0.3–4.2)
UROBILINOGEN UR STRIP-ACNC: 2 E.U./DL
UROBILINOGEN UR STRIP-ACNC: 4 E.U./DL
VA-%PRED-PRE: 59 %
VA-PRE: 3.27 L
VC-%PRED-PRE: 46 %
VC-PRE: 1.76 L
VC-PRED: 3.82 L
VENTRICULAR RATE- MUSE: 147 BPM
VENTRICULAR RATE- MUSE: 72 BPM
VENTRICULAR RATE- MUSE: 84 BPM
VIT B1 PYROPHOSHATE BLD-SCNC: 152 NMOL/L
VIT B12 SERPL-MCNC: 296 PG/ML (ref 232–1245)
WBC # BLD AUTO: 4.7 10E3/UL (ref 4–11)
WBC # BLD AUTO: 5 10E3/UL (ref 4–11)
WBC # BLD AUTO: 5.2 10E3/UL (ref 4–11)
WBC # BLD AUTO: 5.4 10E3/UL (ref 4–11)
WBC # BLD AUTO: 6.2 10E3/UL (ref 4–11)
WBC # BLD AUTO: 6.8 10E3/UL (ref 4–11)
WBC #/AREA URNS AUTO: ABNORMAL /HPF

## 2023-01-01 PROCEDURE — 85730 THROMBOPLASTIN TIME PARTIAL: CPT | Performed by: HOSPITALIST

## 2023-01-01 PROCEDURE — 36415 COLL VENOUS BLD VENIPUNCTURE: CPT | Performed by: EMERGENCY MEDICINE

## 2023-01-01 PROCEDURE — 82248 BILIRUBIN DIRECT: CPT

## 2023-01-01 PROCEDURE — 99000 SPECIMEN HANDLING OFFICE-LAB: CPT | Performed by: INTERNAL MEDICINE

## 2023-01-01 PROCEDURE — C1769 GUIDE WIRE: HCPCS | Performed by: INTERNAL MEDICINE

## 2023-01-01 PROCEDURE — 99291 CRITICAL CARE FIRST HOUR: CPT | Mod: 25

## 2023-01-01 PROCEDURE — 93306 TTE W/DOPPLER COMPLETE: CPT

## 2023-01-01 PROCEDURE — 36415 COLL VENOUS BLD VENIPUNCTURE: CPT

## 2023-01-01 PROCEDURE — 36415 COLL VENOUS BLD VENIPUNCTURE: CPT | Performed by: INTERNAL MEDICINE

## 2023-01-01 PROCEDURE — 84460 ALANINE AMINO (ALT) (SGPT): CPT | Performed by: INTERNAL MEDICINE

## 2023-01-01 PROCEDURE — 99153 MOD SED SAME PHYS/QHP EA: CPT | Performed by: INTERNAL MEDICINE

## 2023-01-01 PROCEDURE — 210N000002 HC R&B HEART CARE

## 2023-01-01 PROCEDURE — 250N000011 HC RX IP 250 OP 636: Mod: JZ | Performed by: EMERGENCY MEDICINE

## 2023-01-01 PROCEDURE — 82374 ASSAY BLOOD CARBON DIOXIDE: CPT | Performed by: EMERGENCY MEDICINE

## 2023-01-01 PROCEDURE — 85004 AUTOMATED DIFF WBC COUNT: CPT | Performed by: EMERGENCY MEDICINE

## 2023-01-01 PROCEDURE — 80053 COMPREHEN METABOLIC PANEL: CPT | Performed by: INTERNAL MEDICINE

## 2023-01-01 PROCEDURE — 250N000013 HC RX MED GY IP 250 OP 250 PS 637: Performed by: HOSPITALIST

## 2023-01-01 PROCEDURE — 74176 CT ABD & PELVIS W/O CONTRAST: CPT

## 2023-01-01 PROCEDURE — 96376 TX/PRO/DX INJ SAME DRUG ADON: CPT

## 2023-01-01 PROCEDURE — 82746 ASSAY OF FOLIC ACID SERUM: CPT | Performed by: INTERNAL MEDICINE

## 2023-01-01 PROCEDURE — 999N000157 HC STATISTIC RCP TIME EA 10 MIN

## 2023-01-01 PROCEDURE — 99152 MOD SED SAME PHYS/QHP 5/>YRS: CPT | Mod: GC | Performed by: INTERNAL MEDICINE

## 2023-01-01 PROCEDURE — 99207 PR INPT ADMISSION FROM CLINIC: CPT | Performed by: PHYSICIAN ASSISTANT

## 2023-01-01 PROCEDURE — 99152 MOD SED SAME PHYS/QHP 5/>YRS: CPT | Performed by: INTERNAL MEDICINE

## 2023-01-01 PROCEDURE — 80061 LIPID PANEL: CPT | Performed by: INTERNAL MEDICINE

## 2023-01-01 PROCEDURE — 71046 X-RAY EXAM CHEST 2 VIEWS: CPT | Mod: TC | Performed by: RADIOLOGY

## 2023-01-01 PROCEDURE — 272N000001 HC OR GENERAL SUPPLY STERILE: Performed by: INTERNAL MEDICINE

## 2023-01-01 PROCEDURE — 84443 ASSAY THYROID STIM HORMONE: CPT

## 2023-01-01 PROCEDURE — 85027 COMPLETE CBC AUTOMATED: CPT | Performed by: HOSPITALIST

## 2023-01-01 PROCEDURE — 80048 BASIC METABOLIC PNL TOTAL CA: CPT

## 2023-01-01 PROCEDURE — 71046 X-RAY EXAM CHEST 2 VIEWS: CPT

## 2023-01-01 PROCEDURE — 36415 COLL VENOUS BLD VENIPUNCTURE: CPT | Performed by: HOSPITALIST

## 2023-01-01 PROCEDURE — 93010 ELECTROCARDIOGRAM REPORT: CPT | Performed by: INTERNAL MEDICINE

## 2023-01-01 PROCEDURE — 99232 SBSQ HOSP IP/OBS MODERATE 35: CPT | Mod: 25 | Performed by: NURSE PRACTITIONER

## 2023-01-01 PROCEDURE — 258N000003 HC RX IP 258 OP 636: Performed by: EMERGENCY MEDICINE

## 2023-01-01 PROCEDURE — 99232 SBSQ HOSP IP/OBS MODERATE 35: CPT | Performed by: HOSPITALIST

## 2023-01-01 PROCEDURE — 99215 OFFICE O/P EST HI 40 MIN: CPT | Performed by: INTERNAL MEDICINE

## 2023-01-01 PROCEDURE — 99214 OFFICE O/P EST MOD 30 MIN: CPT | Performed by: INTERNAL MEDICINE

## 2023-01-01 PROCEDURE — 99222 1ST HOSP IP/OBS MODERATE 55: CPT | Mod: FS | Performed by: PHYSICIAN ASSISTANT

## 2023-01-01 PROCEDURE — 80061 LIPID PANEL: CPT

## 2023-01-01 PROCEDURE — 93005 ELECTROCARDIOGRAM TRACING: CPT

## 2023-01-01 PROCEDURE — 84443 ASSAY THYROID STIM HORMONE: CPT | Performed by: EMERGENCY MEDICINE

## 2023-01-01 PROCEDURE — 51798 US URINE CAPACITY MEASURE: CPT | Performed by: STUDENT IN AN ORGANIZED HEALTH CARE EDUCATION/TRAINING PROGRAM

## 2023-01-01 PROCEDURE — 250N000013 HC RX MED GY IP 250 OP 250 PS 637: Performed by: INTERNAL MEDICINE

## 2023-01-01 PROCEDURE — 81003 URINALYSIS AUTO W/O SCOPE: CPT | Mod: QW | Performed by: STUDENT IN AN ORGANIZED HEALTH CARE EDUCATION/TRAINING PROGRAM

## 2023-01-01 PROCEDURE — 80048 BASIC METABOLIC PNL TOTAL CA: CPT | Performed by: INTERNAL MEDICINE

## 2023-01-01 PROCEDURE — C1894 INTRO/SHEATH, NON-LASER: HCPCS | Performed by: INTERNAL MEDICINE

## 2023-01-01 PROCEDURE — 99214 OFFICE O/P EST MOD 30 MIN: CPT | Mod: 25 | Performed by: INTERNAL MEDICINE

## 2023-01-01 PROCEDURE — 96365 THER/PROPH/DIAG IV INF INIT: CPT

## 2023-01-01 PROCEDURE — 99239 HOSP IP/OBS DSCHRG MGMT >30: CPT | Performed by: HOSPITALIST

## 2023-01-01 PROCEDURE — 82310 ASSAY OF CALCIUM: CPT | Performed by: EMERGENCY MEDICINE

## 2023-01-01 PROCEDURE — 84484 ASSAY OF TROPONIN QUANT: CPT | Performed by: PHYSICIAN ASSISTANT

## 2023-01-01 PROCEDURE — 85027 COMPLETE CBC AUTOMATED: CPT | Performed by: INTERNAL MEDICINE

## 2023-01-01 PROCEDURE — B2111ZZ FLUOROSCOPY OF MULTIPLE CORONARY ARTERIES USING LOW OSMOLAR CONTRAST: ICD-10-PCS | Performed by: INTERNAL MEDICINE

## 2023-01-01 PROCEDURE — 84484 ASSAY OF TROPONIN QUANT: CPT | Performed by: EMERGENCY MEDICINE

## 2023-01-01 PROCEDURE — 250N000013 HC RX MED GY IP 250 OP 250 PS 637: Performed by: EMERGENCY MEDICINE

## 2023-01-01 PROCEDURE — 94375 RESPIRATORY FLOW VOLUME LOOP: CPT

## 2023-01-01 PROCEDURE — 94726 PLETHYSMOGRAPHY LUNG VOLUMES: CPT

## 2023-01-01 PROCEDURE — 250N000009 HC RX 250: Performed by: INTERNAL MEDICINE

## 2023-01-01 PROCEDURE — 84100 ASSAY OF PHOSPHORUS: CPT | Performed by: HOSPITALIST

## 2023-01-01 PROCEDURE — 82310 ASSAY OF CALCIUM: CPT | Performed by: HOSPITALIST

## 2023-01-01 PROCEDURE — 82607 VITAMIN B-12: CPT | Performed by: INTERNAL MEDICINE

## 2023-01-01 PROCEDURE — 84484 ASSAY OF TROPONIN QUANT: CPT | Performed by: HOSPITALIST

## 2023-01-01 PROCEDURE — 83735 ASSAY OF MAGNESIUM: CPT | Performed by: HOSPITALIST

## 2023-01-01 PROCEDURE — 83735 ASSAY OF MAGNESIUM: CPT | Performed by: EMERGENCY MEDICINE

## 2023-01-01 PROCEDURE — 5A2204Z RESTORATION OF CARDIAC RHYTHM, SINGLE: ICD-10-PCS | Performed by: EMERGENCY MEDICINE

## 2023-01-01 PROCEDURE — 93306 TTE W/DOPPLER COMPLETE: CPT | Mod: 26 | Performed by: INTERNAL MEDICINE

## 2023-01-01 PROCEDURE — 92960 CARDIOVERSION ELECTRIC EXT: CPT

## 2023-01-01 PROCEDURE — 80048 BASIC METABOLIC PNL TOTAL CA: CPT | Performed by: HOSPITALIST

## 2023-01-01 PROCEDURE — 93000 ELECTROCARDIOGRAM COMPLETE: CPT | Performed by: PHYSICIAN ASSISTANT

## 2023-01-01 PROCEDURE — 93454 CORONARY ARTERY ANGIO S&I: CPT | Performed by: INTERNAL MEDICINE

## 2023-01-01 PROCEDURE — 250N000011 HC RX IP 250 OP 636: Mod: JZ | Performed by: HOSPITALIST

## 2023-01-01 PROCEDURE — 250N000011 HC RX IP 250 OP 636: Performed by: INTERNAL MEDICINE

## 2023-01-01 PROCEDURE — 99223 1ST HOSP IP/OBS HIGH 75: CPT | Performed by: HOSPITALIST

## 2023-01-01 PROCEDURE — 99213 OFFICE O/P EST LOW 20 MIN: CPT | Performed by: INTERNAL MEDICINE

## 2023-01-01 PROCEDURE — 84425 ASSAY OF VITAMIN B-1: CPT | Mod: 90 | Performed by: INTERNAL MEDICINE

## 2023-01-01 PROCEDURE — 94729 DIFFUSING CAPACITY: CPT

## 2023-01-01 PROCEDURE — 250N000013 HC RX MED GY IP 250 OP 250 PS 637: Performed by: PHYSICIAN ASSISTANT

## 2023-01-01 PROCEDURE — 81001 URINALYSIS AUTO W/SCOPE: CPT | Performed by: INTERNAL MEDICINE

## 2023-01-01 PROCEDURE — 82077 ASSAY SPEC XCP UR&BREATH IA: CPT | Performed by: EMERGENCY MEDICINE

## 2023-01-01 PROCEDURE — 85730 THROMBOPLASTIN TIME PARTIAL: CPT | Performed by: EMERGENCY MEDICINE

## 2023-01-01 PROCEDURE — 96361 HYDRATE IV INFUSION ADD-ON: CPT

## 2023-01-01 PROCEDURE — 82248 BILIRUBIN DIRECT: CPT | Performed by: INTERNAL MEDICINE

## 2023-01-01 PROCEDURE — 272N000240 HC CARDIOVERT/DEFIB/PACER SUPP

## 2023-01-01 PROCEDURE — 93000 ELECTROCARDIOGRAM COMPLETE: CPT | Performed by: INTERNAL MEDICINE

## 2023-01-01 PROCEDURE — 250N000011 HC RX IP 250 OP 636: Mod: JZ | Performed by: INTERNAL MEDICINE

## 2023-01-01 PROCEDURE — G0439 PPPS, SUBSEQ VISIT: HCPCS | Performed by: INTERNAL MEDICINE

## 2023-01-01 PROCEDURE — 93454 CORONARY ARTERY ANGIO S&I: CPT | Mod: 26 | Performed by: INTERNAL MEDICINE

## 2023-01-01 PROCEDURE — 80053 COMPREHEN METABOLIC PANEL: CPT

## 2023-01-01 PROCEDURE — 258N000003 HC RX IP 258 OP 636: Performed by: PHYSICIAN ASSISTANT

## 2023-01-01 PROCEDURE — 99214 OFFICE O/P EST MOD 30 MIN: CPT | Mod: 25 | Performed by: STUDENT IN AN ORGANIZED HEALTH CARE EDUCATION/TRAINING PROGRAM

## 2023-01-01 RX ORDER — FOLIC ACID 1 MG/1
1 TABLET ORAL ONCE
Status: COMPLETED | OUTPATIENT
Start: 2023-01-01 | End: 2023-01-01

## 2023-01-01 RX ORDER — POTASSIUM CHLORIDE 7.45 MG/ML
10 INJECTION INTRAVENOUS ONCE
Status: COMPLETED | OUTPATIENT
Start: 2023-01-01 | End: 2023-01-01

## 2023-01-01 RX ORDER — MULTIVITAMIN,THERAPEUTIC
1 TABLET ORAL ONCE
Status: COMPLETED | OUTPATIENT
Start: 2023-01-01 | End: 2023-01-01

## 2023-01-01 RX ORDER — CHLORTHALIDONE 25 MG/1
25 TABLET ORAL DAILY
Qty: 90 TABLET | Refills: 0 | Status: SHIPPED | OUTPATIENT
Start: 2023-01-01 | End: 2023-01-01

## 2023-01-01 RX ORDER — FOLIC ACID 1 MG/1
1 TABLET ORAL DAILY
Status: DISCONTINUED | OUTPATIENT
Start: 2023-01-01 | End: 2023-01-01 | Stop reason: HOSPADM

## 2023-01-01 RX ORDER — POTASSIUM CHLORIDE 1500 MG/1
20 TABLET, EXTENDED RELEASE ORAL
Status: DISCONTINUED | OUTPATIENT
Start: 2023-01-01 | End: 2023-01-01 | Stop reason: HOSPADM

## 2023-01-01 RX ORDER — AMOXICILLIN 250 MG
1 CAPSULE ORAL 2 TIMES DAILY PRN
Status: DISCONTINUED | OUTPATIENT
Start: 2023-01-01 | End: 2023-01-01 | Stop reason: HOSPADM

## 2023-01-01 RX ORDER — AMIODARONE HYDROCHLORIDE 200 MG/1
200 TABLET ORAL DAILY
Qty: 90 TABLET | Refills: 3 | Status: SHIPPED | OUTPATIENT
Start: 2023-01-01 | End: 2023-01-01

## 2023-01-01 RX ORDER — LISINOPRIL 40 MG/1
40 TABLET ORAL DAILY
Status: DISCONTINUED | OUTPATIENT
Start: 2023-01-01 | End: 2023-01-01 | Stop reason: HOSPADM

## 2023-01-01 RX ORDER — ASPIRIN 81 MG/1
324 TABLET, CHEWABLE ORAL ONCE
Status: COMPLETED | OUTPATIENT
Start: 2023-01-01 | End: 2023-01-01

## 2023-01-01 RX ORDER — TAMSULOSIN HYDROCHLORIDE 0.4 MG/1
0.4 CAPSULE ORAL EVERY EVENING
Status: DISCONTINUED | OUTPATIENT
Start: 2023-01-01 | End: 2023-01-01 | Stop reason: HOSPADM

## 2023-01-01 RX ORDER — METOPROLOL TARTRATE 25 MG/1
25 TABLET, FILM COATED ORAL 2 TIMES DAILY
Qty: 60 TABLET | Refills: 0 | Status: SHIPPED | OUTPATIENT
Start: 2023-01-01 | End: 2023-01-01

## 2023-01-01 RX ORDER — PROPOFOL 10 MG/ML
INJECTION, EMULSION INTRAVENOUS DAILY PRN
Status: COMPLETED | OUTPATIENT
Start: 2023-01-01 | End: 2023-01-01

## 2023-01-01 RX ORDER — SODIUM CHLORIDE 9 MG/ML
INJECTION, SOLUTION INTRAVENOUS CONTINUOUS
Status: DISCONTINUED | OUTPATIENT
Start: 2023-01-01 | End: 2023-01-01 | Stop reason: HOSPADM

## 2023-01-01 RX ORDER — LISINOPRIL 30 MG/1
30 TABLET ORAL DAILY
Qty: 90 TABLET | Refills: 3 | Status: SHIPPED | OUTPATIENT
Start: 2023-01-01 | End: 2023-01-01

## 2023-01-01 RX ORDER — SODIUM CHLORIDE 9 MG/ML
75 INJECTION, SOLUTION INTRAVENOUS CONTINUOUS
Status: ACTIVE | OUTPATIENT
Start: 2023-01-01 | End: 2023-01-01

## 2023-01-01 RX ORDER — LIDOCAINE 40 MG/G
CREAM TOPICAL
Status: DISCONTINUED | OUTPATIENT
Start: 2023-01-01 | End: 2023-01-01

## 2023-01-01 RX ORDER — LISINOPRIL 40 MG/1
40 TABLET ORAL DAILY
Qty: 90 TABLET | Refills: 3 | Status: SHIPPED | OUTPATIENT
Start: 2023-01-01 | End: 2023-01-01

## 2023-01-01 RX ORDER — ASPIRIN 81 MG/1
81 TABLET, CHEWABLE ORAL DAILY
Status: DISCONTINUED | OUTPATIENT
Start: 2023-01-01 | End: 2023-01-01 | Stop reason: HOSPADM

## 2023-01-01 RX ORDER — NITROGLYCERIN 5 MG/ML
VIAL (ML) INTRAVENOUS
Status: DISCONTINUED | OUTPATIENT
Start: 2023-01-01 | End: 2023-01-01 | Stop reason: HOSPADM

## 2023-01-01 RX ORDER — POTASSIUM CHLORIDE 7.45 MG/ML
10 INJECTION INTRAVENOUS ONCE
Status: CANCELLED | OUTPATIENT
Start: 2023-01-01 | End: 2023-01-01

## 2023-01-01 RX ORDER — LORAZEPAM 0.5 MG/1
0.5 TABLET ORAL
Status: DISCONTINUED | OUTPATIENT
Start: 2023-01-01 | End: 2023-01-01 | Stop reason: HOSPADM

## 2023-01-01 RX ORDER — HEPARIN SODIUM 10000 [USP'U]/100ML
0-5000 INJECTION, SOLUTION INTRAVENOUS CONTINUOUS
Status: DISCONTINUED | OUTPATIENT
Start: 2023-01-01 | End: 2023-01-01

## 2023-01-01 RX ORDER — AMOXICILLIN 250 MG
2 CAPSULE ORAL 2 TIMES DAILY PRN
Status: DISCONTINUED | OUTPATIENT
Start: 2023-01-01 | End: 2023-01-01 | Stop reason: HOSPADM

## 2023-01-01 RX ORDER — LISINOPRIL 20 MG/1
20 TABLET ORAL DAILY
Qty: 90 TABLET | Refills: 0 | Status: SHIPPED | OUTPATIENT
Start: 2023-01-01 | End: 2023-01-01

## 2023-01-01 RX ORDER — AMIODARONE HYDROCHLORIDE 200 MG/1
200 TABLET ORAL DAILY
Status: DISCONTINUED | OUTPATIENT
Start: 2023-01-01 | End: 2023-01-01 | Stop reason: HOSPADM

## 2023-01-01 RX ORDER — PROPOFOL 10 MG/ML
1 INJECTION, EMULSION INTRAVENOUS ONCE
Status: DISCONTINUED | OUTPATIENT
Start: 2023-01-01 | End: 2023-01-01

## 2023-01-01 RX ORDER — FLUMAZENIL 0.1 MG/ML
0.2 INJECTION, SOLUTION INTRAVENOUS
Status: ACTIVE | OUTPATIENT
Start: 2023-01-01 | End: 2023-01-01

## 2023-01-01 RX ORDER — LORAZEPAM 2 MG/ML
0.5 INJECTION INTRAMUSCULAR
Status: DISCONTINUED | OUTPATIENT
Start: 2023-01-01 | End: 2023-01-01 | Stop reason: HOSPADM

## 2023-01-01 RX ORDER — CHLORTHALIDONE 25 MG/1
25 TABLET ORAL DAILY
Qty: 30 TABLET | Refills: 3 | Status: SHIPPED | OUTPATIENT
Start: 2023-01-01 | End: 2023-01-01

## 2023-01-01 RX ORDER — ACETAMINOPHEN 325 MG/1
650 TABLET ORAL EVERY 4 HOURS PRN
Status: DISCONTINUED | OUTPATIENT
Start: 2023-01-01 | End: 2023-01-01 | Stop reason: HOSPADM

## 2023-01-01 RX ORDER — FENTANYL CITRATE 50 UG/ML
INJECTION, SOLUTION INTRAMUSCULAR; INTRAVENOUS
Status: DISCONTINUED | OUTPATIENT
Start: 2023-01-01 | End: 2023-01-01 | Stop reason: HOSPADM

## 2023-01-01 RX ORDER — METOPROLOL TARTRATE 25 MG/1
12.5 TABLET, FILM COATED ORAL 2 TIMES DAILY
Qty: 90 TABLET | Refills: 3 | Status: ON HOLD | OUTPATIENT
Start: 2023-01-01 | End: 2024-01-01

## 2023-01-01 RX ORDER — POTASSIUM CHLORIDE 1500 MG/1
20 TABLET, EXTENDED RELEASE ORAL DAILY
Qty: 30 TABLET | Refills: 0 | Status: SHIPPED | OUTPATIENT
Start: 2023-01-01 | End: 2023-01-01 | Stop reason: ALTCHOICE

## 2023-01-01 RX ORDER — ONDANSETRON 2 MG/ML
4 INJECTION INTRAMUSCULAR; INTRAVENOUS EVERY 6 HOURS PRN
Status: DISCONTINUED | OUTPATIENT
Start: 2023-01-01 | End: 2023-01-01 | Stop reason: HOSPADM

## 2023-01-01 RX ORDER — ASPIRIN 325 MG
325 TABLET ORAL ONCE
Status: COMPLETED | OUTPATIENT
Start: 2023-01-01 | End: 2023-01-01

## 2023-01-01 RX ORDER — TAMSULOSIN HYDROCHLORIDE 0.4 MG/1
0.4 CAPSULE ORAL DAILY
Qty: 90 CAPSULE | Refills: 3 | Status: SHIPPED | OUTPATIENT
Start: 2023-01-01

## 2023-01-01 RX ORDER — FLECAINIDE ACETATE 100 MG/1
100 TABLET ORAL 2 TIMES DAILY
Qty: 180 TABLET | Refills: 3 | Status: ON HOLD | OUTPATIENT
Start: 2023-01-01 | End: 2023-01-01

## 2023-01-01 RX ORDER — HEPARIN SODIUM 1000 [USP'U]/ML
INJECTION, SOLUTION INTRAVENOUS; SUBCUTANEOUS
Status: DISCONTINUED | OUTPATIENT
Start: 2023-01-01 | End: 2023-01-01 | Stop reason: HOSPADM

## 2023-01-01 RX ORDER — LANOLIN ALCOHOL/MO/W.PET/CERES
3 CREAM (GRAM) TOPICAL
Status: DISCONTINUED | OUTPATIENT
Start: 2023-01-01 | End: 2023-01-01 | Stop reason: HOSPADM

## 2023-01-01 RX ORDER — ACETAMINOPHEN 650 MG/1
650 SUPPOSITORY RECTAL EVERY 6 HOURS PRN
Status: DISCONTINUED | OUTPATIENT
Start: 2023-01-01 | End: 2023-01-01 | Stop reason: HOSPADM

## 2023-01-01 RX ORDER — AMIODARONE HYDROCHLORIDE 200 MG/1
TABLET ORAL
Qty: 50 TABLET | Refills: 0 | Status: SHIPPED | OUTPATIENT
Start: 2023-01-01 | End: 2023-01-01

## 2023-01-01 RX ORDER — IOPAMIDOL 755 MG/ML
INJECTION, SOLUTION INTRAVASCULAR
Status: DISCONTINUED | OUTPATIENT
Start: 2023-01-01 | End: 2023-01-01 | Stop reason: HOSPADM

## 2023-01-01 RX ORDER — ONDANSETRON 4 MG/1
4 TABLET, ORALLY DISINTEGRATING ORAL EVERY 6 HOURS PRN
Status: DISCONTINUED | OUTPATIENT
Start: 2023-01-01 | End: 2023-01-01 | Stop reason: HOSPADM

## 2023-01-01 RX ORDER — MAGNESIUM OXIDE 400 MG/1
800 TABLET ORAL ONCE
Status: COMPLETED | OUTPATIENT
Start: 2023-01-01 | End: 2023-01-01

## 2023-01-01 RX ORDER — AMIODARONE HYDROCHLORIDE 200 MG/1
200 TABLET ORAL 2 TIMES DAILY
Status: DISCONTINUED | OUTPATIENT
Start: 2023-01-01 | End: 2023-01-01 | Stop reason: HOSPADM

## 2023-01-01 RX ORDER — ACETAMINOPHEN 325 MG/1
650 TABLET ORAL EVERY 6 HOURS PRN
Status: DISCONTINUED | OUTPATIENT
Start: 2023-01-01 | End: 2023-01-01

## 2023-01-01 RX ORDER — METOPROLOL TARTRATE 25 MG/1
25 TABLET, FILM COATED ORAL 2 TIMES DAILY
Status: DISCONTINUED | OUTPATIENT
Start: 2023-01-01 | End: 2023-01-01 | Stop reason: HOSPADM

## 2023-01-01 RX ORDER — ASPIRIN 81 MG/1
243 TABLET, CHEWABLE ORAL ONCE
Status: COMPLETED | OUTPATIENT
Start: 2023-01-01 | End: 2023-01-01

## 2023-01-01 RX ORDER — VERAPAMIL HYDROCHLORIDE 2.5 MG/ML
INJECTION, SOLUTION INTRAVENOUS
Status: DISCONTINUED | OUTPATIENT
Start: 2023-01-01 | End: 2023-01-01 | Stop reason: HOSPADM

## 2023-01-01 RX ORDER — AMIODARONE HYDROCHLORIDE 200 MG/1
TABLET ORAL
Qty: 90 TABLET | Refills: 3 | Status: ON HOLD | OUTPATIENT
Start: 2023-01-01 | End: 2024-01-01

## 2023-01-01 RX ORDER — MULTIVITAMIN,THERAPEUTIC
1 TABLET ORAL DAILY
Status: DISCONTINUED | OUTPATIENT
Start: 2023-01-01 | End: 2023-01-01 | Stop reason: HOSPADM

## 2023-01-01 RX ORDER — NITROGLYCERIN 0.4 MG/1
0.4 TABLET SUBLINGUAL EVERY 5 MIN PRN
Status: DISCONTINUED | OUTPATIENT
Start: 2023-01-01 | End: 2023-01-01 | Stop reason: HOSPADM

## 2023-01-01 RX ORDER — LIDOCAINE 40 MG/G
CREAM TOPICAL
Status: DISCONTINUED | OUTPATIENT
Start: 2023-01-01 | End: 2023-01-01 | Stop reason: HOSPADM

## 2023-01-01 RX ORDER — ATORVASTATIN CALCIUM 20 MG/1
20 TABLET, FILM COATED ORAL DAILY
Qty: 90 TABLET | Refills: 3 | Status: ON HOLD | OUTPATIENT
Start: 2023-01-01 | End: 2024-01-01

## 2023-01-01 RX ORDER — FLECAINIDE ACETATE 100 MG/1
100 TABLET ORAL 2 TIMES DAILY
Qty: 180 TABLET | Refills: 0 | Status: SHIPPED | OUTPATIENT
Start: 2023-01-01 | End: 2023-01-01

## 2023-01-01 RX ORDER — POTASSIUM CHLORIDE 750 MG/1
10 TABLET, EXTENDED RELEASE ORAL DAILY
Qty: 30 TABLET | Refills: 1 | Status: ON HOLD | OUTPATIENT
Start: 2023-01-01 | End: 2024-01-01

## 2023-01-01 RX ADMIN — POTASSIUM CHLORIDE 10 MEQ: 7.46 INJECTION, SOLUTION INTRAVENOUS at 02:39

## 2023-01-01 RX ADMIN — HEPARIN SODIUM 800 UNITS/HR: 10000 INJECTION, SOLUTION INTRAVENOUS at 22:50

## 2023-01-01 RX ADMIN — FOLIC ACID 1 MG: 1 TABLET ORAL at 02:39

## 2023-01-01 RX ADMIN — THIAMINE HCL TAB 100 MG 100 MG: 100 TAB at 02:39

## 2023-01-01 RX ADMIN — PROPOFOL 20 MG: 10 INJECTION, EMULSION INTRAVENOUS at 19:57

## 2023-01-01 RX ADMIN — METOPROLOL TARTRATE 25 MG: 25 TABLET, FILM COATED ORAL at 10:19

## 2023-01-01 RX ADMIN — Medication 800 MG: at 02:38

## 2023-01-01 RX ADMIN — THIAMINE HCL TAB 100 MG 100 MG: 100 TAB at 08:28

## 2023-01-01 RX ADMIN — HEPARIN SODIUM 800 UNITS/HR: 10000 INJECTION, SOLUTION INTRAVENOUS at 19:26

## 2023-01-01 RX ADMIN — ASPIRIN 81 MG 324 MG: 81 TABLET ORAL at 02:39

## 2023-01-01 RX ADMIN — FOLIC ACID 1 MG: 1 TABLET ORAL at 08:28

## 2023-01-01 RX ADMIN — METOPROLOL TARTRATE 25 MG: 25 TABLET, FILM COATED ORAL at 20:03

## 2023-01-01 RX ADMIN — METOPROLOL TARTRATE 25 MG: 25 TABLET, FILM COATED ORAL at 08:29

## 2023-01-01 RX ADMIN — SODIUM CHLORIDE 1000 ML: 9 INJECTION, SOLUTION INTRAVENOUS at 19:43

## 2023-01-01 RX ADMIN — PROPOFOL 20 MG: 10 INJECTION, EMULSION INTRAVENOUS at 19:58

## 2023-01-01 RX ADMIN — ASPIRIN 81 MG 81 MG: 81 TABLET ORAL at 10:19

## 2023-01-01 RX ADMIN — THERA TABS 1 TABLET: TAB at 08:28

## 2023-01-01 RX ADMIN — HEPARIN SODIUM 550 UNITS/HR: 10000 INJECTION, SOLUTION INTRAVENOUS at 20:29

## 2023-01-01 RX ADMIN — THIAMINE HCL TAB 100 MG 100 MG: 100 TAB at 20:31

## 2023-01-01 RX ADMIN — LISINOPRIL 40 MG: 40 TABLET ORAL at 15:40

## 2023-01-01 RX ADMIN — AMIODARONE HYDROCHLORIDE 200 MG: 200 TABLET ORAL at 13:11

## 2023-01-01 RX ADMIN — TAMSULOSIN HYDROCHLORIDE 0.4 MG: 0.4 CAPSULE ORAL at 20:03

## 2023-01-01 RX ADMIN — ASPIRIN 325 MG ORAL TABLET 325 MG: 325 PILL ORAL at 08:28

## 2023-01-01 RX ADMIN — LISINOPRIL 40 MG: 40 TABLET ORAL at 08:29

## 2023-01-01 RX ADMIN — THERA TABS 1 TABLET: TAB at 20:31

## 2023-01-01 RX ADMIN — THERA TABS 1 TABLET: TAB at 02:38

## 2023-01-01 RX ADMIN — SODIUM CHLORIDE: 9 INJECTION, SOLUTION INTRAVENOUS at 08:29

## 2023-01-01 RX ADMIN — FOLIC ACID 1 MG: 1 TABLET ORAL at 20:31

## 2023-01-01 ASSESSMENT — ACTIVITIES OF DAILY LIVING (ADL)
ADLS_ACUITY_SCORE: 37
ADLS_ACUITY_SCORE: 35
ADLS_ACUITY_SCORE: 37
ADLS_ACUITY_SCORE: 35
ADLS_ACUITY_SCORE: 37
ADLS_ACUITY_SCORE: 35
ADLS_ACUITY_SCORE: 37
ADLS_ACUITY_SCORE: 37
ADLS_ACUITY_SCORE: 35
ADLS_ACUITY_SCORE: 37
CURRENT_FUNCTION: NEEDS ASSISTANCE
ADLS_ACUITY_SCORE: 37

## 2023-01-01 ASSESSMENT — PAIN SCALES - GENERAL: PAINLEVEL: NO PAIN (0)

## 2023-02-28 NOTE — TELEPHONE ENCOUNTER
Received refill request for:  Lisinopril    Future Appointments   Date Time Provider Department Center   3/29/2023 10:30 AM Liliana Edward PA-C [facility] Cardiology Refill Guideline reviewed.  Medication meets criteria for refill.    Shanell Mae RN, BSN  02/28/23 at 8:11 AM

## 2023-04-05 NOTE — PROGRESS NOTES
Cardiology Clinic Progress Note  Gavin House MRN# [id number]   YOB: 1945 Age: 77 year old   Primary Cardiologist: Was Dr. Verma, will need new cardiologist moving forward Reason for visit: Annual follow-up            Assessment and Plan:   Gavin House is a very pleasant 77 year old male who is here today for annual follow-up.    Paroxysmal atrial fibrillation  -Has been on flecainide 100 mg twice daily for several years  -Not on anticoagulation; patient and wife report this was never discussed historically  -[id number] score is 5 (agex2, HTN, hx CVA)  -Risks and benefits of chronic anticoagulation discussed with patient and wife.  After discussion, they both favor initiation of Eliquis 5 mg twice daily for cardioembolic risk reduction.  I will discontinue his aspirin.  -ECG completed today reveals NSR with VR 68, , , QT/QTc 426/440 ms    -stable when compared to prior  Hypertension  -On lisinopril 20 mg once daily  -BP control has been suboptimal for a while now  -We will increase lisinopril to 30 mg once daily; goal /80 or less  History of CVA  -Brain MRI completed 10/2021 without acute abnormalities but evidence of multiple small old strokes  -Seen by neurology 12/2021; consideration of chronic anticoagulation initiation discussed but deferred to cardiology/PCP  Alcohol abuse  -Consuming 4-5 alcoholic beverages daily  -Cessation encouraged      Changes today:   INCREASE lisinopril to 30 mg once daily  START Eliquis 5 mg twice daily for cardioembolic risk reduction    Follow up plan:   Labs today (BMP, LFTs)  Follow-up with me in 6 to 8 weeks with labs prior for ongoing blood pressure management.  Follow-up with PCP in the near future given concerning symptoms of unintentional weight loss  We will need to have him establish care with a new cardiologist moving forward.    Yadira Bullock PA-C  [facility]  Pager: [phone]          History of Presenting  Illness:    Gavin House is a very pleasant 77 year old male with a history of paroxysmal atrial fibrillation,  Hypertension, prior CVA, and ongoing alcohol abuse.    He has historically done very well on flecainide 100 mg twice daily for atrial fibrillation suppression.  However, he has had episodes of recurrent atrial fibrillation occurring in the setting of excess alcohol intake.  His last echocardiogram was completed in 2016 revealing EF 55 to 60% with grade 1 diastolic dysfunction.  There was no significant valvular disease.     Patient presents to clinic today for annual follow-up.  Unfortunately, he tells me he had a stroke in late 2021 and has since been struggling with memory difficulties.  He saw a neurologist after this event.  Him and his wife state they have never been talked to about initiation of chronic anticoagulation in the past.  This was not brought up after his stroke either.    Mr. House denies episodes of chest pain, shortness of breath, lightheadedness, dizziness, orthopnea, near-syncope or syncope in the past year.  He has no complaints but reports he is nervous for his appointment today as he does not go to the doctor often.  Has not had labs completed in 1.5 years.  Blood pressure 170/90 with heart rate 55 in clinic today.  BP recheck 160/90.    Reports at least a 10 pound weight loss over the past year, unintentional.  Him and his wife report his appetite is poor-eating very little amounts but often.  Does not perform any routine exercise.  Denies tobacco use.  Consumes 4-5 alcoholic beverages daily.      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 or more     Drug use: No     Sexual activity: Yes     Partners: Female   Other Topics Concern     Parent/sibling w/ CABG, MI or angioplasty before 65F 55M? No   Social History Narrative    , has 2 children, is retired from his manufacturing job as of age 68.  He goes to the bar daily to socialize and drink -- does not want to stop drinking.  (last updated 10/27/2019)      Social Determinants of Health     Financial Resource Strain: Not on file   Food Insecurity: Not on file   Transportation Needs: Not on file   Physical Activity: Not on file   Stress: Not on file   Social Connections: Not on file   Intimate Partner Violence: Not on file   Housing Stability: Not on file            Review of Systems:   Please see HPI         Physical Exam:   Vitals: BP (!) 170/90   Pulse 55   Resp 17   Ht 1.651 m (5' 5\")   Wt 48.5 kg (107 lb)   SpO2 99%   BMI 17.81 kg/m     Wt Readings from Last 4 Encounters:   22 53.1 kg (117 lb)   22 48.6 kg (107 lb 3.2 oz)   21 49.7 kg (109 lb 9.6 oz)   10/25/21 49 kg (108 lb)     GEN: well nourished, in no acute distress.  HEENT:  Pupils equal, round. Sclerae nonicteric.   NECK: Supple, no masses appreciated.  No JVD with patient at 90 degrees  C/V:  Regular rate and rhythm, no murmur, rub or gallop.    RESP: Respirations are unlabored. Clear to auscultation bilaterally without wheezing, rales, or rhonchi.  GI: Abdomen soft, nontender.  EXTREM: No LE edema.  NEURO: Alert and oriented, cooperative.  SKIN: Warm and dry.        Data:   LIPID RESULTS:  Lab Results   Component Value Date    CHOL 172 10/29/2021    CHOL 171 10/02/2018    HDL 49 " 10/29/2021    HDL 62 10/02/2018     (H) 10/29/2021    LDL 88 10/02/2018    TRIG 110 10/29/2021    TRIG 104 10/02/2018    CHOLHDLRATIO 2.5 12/03/2014     LIVER ENZYME RESULTS:  Lab Results   Component Value Date    AST 15 10/29/2021    AST 25 10/27/2019    ALT 16 10/29/2021    ALT 16 10/27/2019     CBC RESULTS:  Lab Results   Component Value Date    WBC 5.1 10/29/2021    WBC 4.6 05/17/2021    RBC 4.35 (L) 10/29/2021    RBC 4.14 (L) 05/17/2021    HGB 14.1 10/29/2021    HGB 13.5 05/17/2021    HCT 42.8 10/29/2021    HCT 41.5 05/17/2021    MCV 98 10/29/2021     05/17/2021    MCH 32.4 10/29/2021    MCH 32.6 05/17/2021    MCHC 32.9 10/29/2021    MCHC 32.5 05/17/2021    RDW 12.4 10/29/2021    RDW 12.5 05/17/2021     10/29/2021     05/17/2021     BMP RESULTS:  Lab Results   Component Value Date     10/29/2021     05/17/2021    POTASSIUM 3.7 10/29/2021    POTASSIUM 4.0 05/17/2021    CHLORIDE 108 10/29/2021    CHLORIDE 104 05/17/2021    CO2 28 10/29/2021    CO2 32 05/17/2021    ANIONGAP 5 10/29/2021    ANIONGAP 2 (L) 05/17/2021    GLC 91 10/29/2021    GLC 97 05/17/2021    BUN 13 10/29/2021    BUN 10 05/17/2021    CR 1.02 10/29/2021    CR 0.84 05/17/2021    GFRESTIMATED 72 10/29/2021    GFRESTIMATED 86 05/17/2021    GFRESTBLACK >90 05/17/2021    SHELBY 8.3 (L) 10/29/2021    SHELBY 8.3 (L) 05/17/2021      A1C RESULTS:  Lab Results   Component Value Date    A1C 5.0 12/14/2021     INR RESULTS:  Lab Results   Component Value Date    INR 1.08 10/27/2019    INR 0.99 07/21/2008            Medications     Current Outpatient Medications   Medication Sig Dispense Refill     aspirin (ASA) 325 MG EC tablet Take 1 tablet (325 mg) by mouth daily 100 tablet 3     Ca Carbonate-Mag Hydroxide (ROLAIDS PO) Take by mouth as needed       EPINEPHrine 0.125 MG/ACT AERO Inhale 1 puff into the lungs daily as needed       flecainide (TAMBOCOR) 100 MG tablet Take 1 tablet (100 mg) by mouth 2 times daily Appointment  "required for further refills 180 tablet 0     lisinopril (ZESTRIL) 20 MG tablet Take 1 tablet (20 mg) by mouth daily 90 tablet 0     tamsulosin (FLOMAX) 0.4 MG capsule Take 1 capsule (0.4 mg) by mouth daily 90 capsule 3          Past Medical History     Past Medical History:   Diagnosis Date     Alcohol use      Allergic rhinitis, cause unspecified      Diverticulosis of colon     Diverticulitis 2010     Mild intermittent asthma      Paroxysmal atrial fibrillation (H) 2016     PVC (premature ventricular contraction)     intermittent bigeminy     Rosacea      Scoliosis (and kyphoscoliosis), idiopathic      Past Surgical History:   Procedure Laterality Date     [facility] NONSPECIFIC PROCEDURE      right inguinal herniorraphy     [facility] NONSPECIFIC PROCEDURE  age 16    L inguinal herniorraphy     Family History   Problem Relation Age of Onset     Colon Cancer Father          of this age 69     Alcoholism Father      Heart Disease Mother         \"enlarged heart\"; d: age 75     C.A.D. Brother          in his 80's      Alcoholism Brother          in his 60's             Allergies   Penicillins      45 minutes spent on the date of the encounter doing chart review, history and exam, documentation and further activities as noted above    Yadira Bullock PA-C  [facility]  Pager: [phone]    "

## 2023-04-05 NOTE — PATIENT INSTRUCTIONS
Call the nurse for any questions or concerns at [phone]      Plan:  1. Medication changes:     STOP aspirin    START Eliquis 5 mg twice daily    INCREASE lisinopril to 30 mg once daily.     2. See your primary care provider in the near future, Dr. Eller.     3. Follow up: with me in 6-8 weeks with labs prior   -Scheduling phone number: [phone]    It was great seeing you today!    Yadira Bullock PA-C  Physician Assistant  [facility]

## 2023-04-05 NOTE — LETTER
4/5/2023    Kash Eller MD  [address] [place] [id number]    RE: Gavin House       Dear Colleague,     I had the pleasure of seeing Gavin House in the [facility].  Cardiology Clinic Progress Note  Gavin House MRN# [id number]   YOB: 1945 Age: 77 year old   Primary Cardiologist: Was Dr. Verma, will need new cardiologist moving forward Reason for visit: Annual follow-up            Assessment and Plan:   Gavin House is a very pleasant 77 year old male who is here today for annual follow-up.    Paroxysmal atrial fibrillation  -Has been on flecainide 100 mg twice daily for several years  -Not on anticoagulation; patient and wife report this was never discussed historically  -[id number] score is 5 (agex2, HTN, hx CVA)  -Risks and benefits of chronic anticoagulation discussed with patient and wife.  After discussion, they both favor initiation of Eliquis 5 mg twice daily for cardioembolic risk reduction.  I will discontinue his aspirin.  -ECG completed today reveals NSR with VR 68, , , QT/QTc 426/440 ms    -stable when compared to prior  Hypertension  -On lisinopril 20 mg once daily  -BP control has been suboptimal for a while now  -We will increase lisinopril to 30 mg once daily; goal /80 or less  History of CVA  -Brain MRI completed 10/2021 without acute abnormalities but evidence of multiple small old strokes  -Seen by neurology 12/2021; consideration of chronic anticoagulation initiation discussed but deferred to cardiology/PCP  Alcohol abuse  -Consuming 4-5 alcoholic beverages daily  -Cessation encouraged      Changes today:   INCREASE lisinopril to 30 mg once daily  START Eliquis 5 mg twice daily for cardioembolic risk reduction    Follow up plan:   Labs today (BMP, LFTs)  Follow-up with me in 6 to 8 weeks with labs prior for ongoing blood pressure management.  Follow-up with PCP in the near future given concerning symptoms of  unintentional weight loss  We will need to have him establish care with a new cardiologist moving forward.    Yadira Bullock PA-C  [facility]  Pager: [phone]          History of Presenting Illness:    Gavin House is a very pleasant 77 year old male with a history of paroxysmal atrial fibrillation,  Hypertension, prior CVA, and ongoing alcohol abuse.    He has historically done very well on flecainide 100 mg twice daily for atrial fibrillation suppression.  However, he has had episodes of recurrent atrial fibrillation occurring in the setting of excess alcohol intake.  His last echocardiogram was completed in 2016 revealing EF 55 to 60% with grade 1 diastolic dysfunction.  There was no significant valvular disease.     Patient presents to clinic today for annual follow-up.  Unfortunately, he tells me he had a stroke in late 2021 and has since been struggling with memory difficulties.  He saw a neurologist after this event.  Him and his wife state they have never been talked to about initiation of chronic anticoagulation in the past.  This was not brought up after his stroke either.    Mr. House denies episodes of chest pain, shortness of breath, lightheadedness, dizziness, orthopnea, near-syncope or syncope in the past year.  He has no complaints but reports he is nervous for his appointment today as he does not go to the doctor often.  Has not had labs completed in 1.5 years.  Blood pressure 170/90 with heart rate 55 in clinic today.  BP recheck 160/90.    Reports at least a 10 pound weight loss over the past year, unintentional.  Him and his wife report his appetite is poor-eating very little amounts but often.  Does not perform any routine exercise.  Denies tobacco use.  Consumes 4-5 alcoholic beverages daily.      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 or more    Drug use: No    Sexual activity: Yes     Partners: Female   Other Topics Concern    Parent/sibling w/ CABG, MI or angioplasty before 65F 55M? No   Social History Narrative    , has 2 children, is retired from his manufacturing job as of age 68.  He goes to the bar daily to socialize and drink -- does not want to stop drinking.  (last updated 10/27/2019)      Social Determinants of Health     Financial Resource Strain: Not on file   Food Insecurity: Not on file   Transportation Needs: Not on file   Physical Activity: Not on file   Stress: Not on file   Social Connections: Not on file   Intimate Partner Violence: Not on file   Housing Stability: Not on file            Review of Systems:   Please see HPI         Physical Exam:   Vitals: BP (!) 170/90   Pulse 55   Resp 17   Ht 1.651 m (5' 5\")   Wt 48.5 kg (107 lb)   SpO2 99%   BMI 17.81 kg/m     Wt Readings from Last 4 Encounters:   22 53.1 kg (117 lb)   22 48.6 kg (107 lb 3.2 oz)   21 49.7 kg (109 lb 9.6 oz)   10/25/21 49 kg (108 lb)     GEN: well nourished, in no acute distress.  HEENT:  Pupils equal, round. Sclerae nonicteric.   NECK: Supple, no masses appreciated.  No JVD with patient at 90 degrees  C/V:  Regular rate and rhythm, no murmur, rub or gallop.    RESP: Respirations are unlabored. Clear to auscultation bilaterally without wheezing, rales, or rhonchi.  GI: Abdomen soft, " nontender.  EXTREM: No LE edema.  NEURO: Alert and oriented, cooperative.  SKIN: Warm and dry.        Data:   LIPID RESULTS:  Lab Results   Component Value Date    CHOL 172 10/29/2021    CHOL 171 10/02/2018    HDL 49 10/29/2021    HDL 62 10/02/2018     (H) 10/29/2021    LDL 88 10/02/2018    TRIG 110 10/29/2021    TRIG 104 10/02/2018    CHOLHDLRATIO 2.5 12/03/2014     LIVER ENZYME RESULTS:  Lab Results   Component Value Date    AST 15 10/29/2021    AST 25 10/27/2019    ALT 16 10/29/2021    ALT 16 10/27/2019     CBC RESULTS:  Lab Results   Component Value Date    WBC 5.1 10/29/2021    WBC 4.6 05/17/2021    RBC 4.35 (L) 10/29/2021    RBC 4.14 (L) 05/17/2021    HGB 14.1 10/29/2021    HGB 13.5 05/17/2021    HCT 42.8 10/29/2021    HCT 41.5 05/17/2021    MCV 98 10/29/2021     05/17/2021    MCH 32.4 10/29/2021    MCH 32.6 05/17/2021    MCHC 32.9 10/29/2021    MCHC 32.5 05/17/2021    RDW 12.4 10/29/2021    RDW 12.5 05/17/2021     10/29/2021     05/17/2021     BMP RESULTS:  Lab Results   Component Value Date     10/29/2021     05/17/2021    POTASSIUM 3.7 10/29/2021    POTASSIUM 4.0 05/17/2021    CHLORIDE 108 10/29/2021    CHLORIDE 104 05/17/2021    CO2 28 10/29/2021    CO2 32 05/17/2021    ANIONGAP 5 10/29/2021    ANIONGAP 2 (L) 05/17/2021    GLC 91 10/29/2021    GLC 97 05/17/2021    BUN 13 10/29/2021    BUN 10 05/17/2021    CR 1.02 10/29/2021    CR 0.84 05/17/2021    GFRESTIMATED 72 10/29/2021    GFRESTIMATED 86 05/17/2021    GFRESTBLACK >90 05/17/2021    SHELBY 8.3 (L) 10/29/2021    SHELBY 8.3 (L) 05/17/2021      A1C RESULTS:  Lab Results   Component Value Date    A1C 5.0 12/14/2021     INR RESULTS:  Lab Results   Component Value Date    INR 1.08 10/27/2019    INR 0.99 07/21/2008            Medications     Current Outpatient Medications   Medication Sig Dispense Refill    aspirin (ASA) 325 MG EC tablet Take 1 tablet (325 mg) by mouth daily 100 tablet 3    Ca Carbonate-Mag Hydroxide (ROLAIDS  "PO) Take by mouth as needed      EPINEPHrine 0.125 MG/ACT AERO Inhale 1 puff into the lungs daily as needed      flecainide (TAMBOCOR) 100 MG tablet Take 1 tablet (100 mg) by mouth 2 times daily Appointment required for further refills 180 tablet 0    lisinopril (ZESTRIL) 20 MG tablet Take 1 tablet (20 mg) by mouth daily 90 tablet 0    tamsulosin (FLOMAX) 0.4 MG capsule Take 1 capsule (0.4 mg) by mouth daily 90 capsule 3          Past Medical History     Past Medical History:   Diagnosis Date    Alcohol use     Allergic rhinitis, cause unspecified     Diverticulosis of colon     Diverticulitis 2010    Mild intermittent asthma     Paroxysmal atrial fibrillation (H) 2016    PVC (premature ventricular contraction)     intermittent bigeminy    Rosacea     Scoliosis (and kyphoscoliosis), idiopathic      Past Surgical History:   Procedure Laterality Date    [facility] NONSPECIFIC PROCEDURE  1974    right inguinal herniorraphy    [facility] NONSPECIFIC PROCEDURE  age 16    L inguinal herniorraphy     Family History   Problem Relation Age of Onset    Colon Cancer Father          of this age 69    Alcoholism Father     Heart Disease Mother         \"enlarged heart\"; d: age 75    C.A.D. Brother          in his 80's     Alcoholism Brother          in his 60's             Allergies   Penicillins      45 minutes spent on the date of the encounter doing chart review, history and exam, documentation and further activities as noted above    NATALY Bryant [facility]  Pager: [phone]        Thank you for allowing me to participate in the care of your patient.      Sincerely,     NATALY Bryant [facility]  cc:   No referring provider defined for this encounter.        "

## 2023-04-10 NOTE — TELEPHONE ENCOUNTER
Call out to pt informed pt of below. Pt had no questions/concerns today. Fern Whittington, RN on 4/10/2023 at 4:15 PM      Yadira Bullock PA-C [facility] 7  Labs reviewed. Results WNL. No changes to previously discussed plan    Associated Results      Component      Latest Ref Rng 4/5/2023   Sodium      136 - 145 mmol/L 141    Potassium      3.4 - 5.3 mmol/L 4.1    Chloride      98 - 107 mmol/L 103    Carbon Dioxide (CO2)      22 - 29 mmol/L 29    Anion Gap      7 - 15 mmol/L 9    Urea Nitrogen      8.0 - 23.0 mg/dL 7.3 (L)    Creatinine      0.67 - 1.17 mg/dL 0.88    Calcium      8.8 - 10.2 mg/dL 9.0    Glucose      70 - 99 mg/dL 96    GFR Estimate      >60 mL/min/1.73m2 89    Protein Total      6.4 - 8.3 g/dL 7.6    Albumin      3.5 - 5.2 g/dL 4.3    Bilirubin Total      <=1.2 mg/dL 0.7    Alkaline Phosphatase      40 - 129 U/L 100    AST      10 - 50 U/L 17    ALT      10 - 50 U/L 6 (L)    Bilirubin Direct      0.00 - 0.30 mg/dL <0.20       Legend:  (L) Low

## 2023-06-07 NOTE — LETTER
6/7/2023    Kash Eller MD  [address] [place] [id number]    RE: Gavin House       Dear Colleague,     I had the pleasure of seeing Gavin House in the [facility].  Cardiology Clinic Progress Note  Gavin House MRN# [id number]   YOB: 1945 Age: 77 year old   Primary Cardiologist: With Dr. Verma; will need to cardiologist moving forward Reason for visit: Follow-up            Assessment and Plan:   Gavin House is a very pleasant 77 year old male who is here today for follow-up    Paroxysmal atrial fibrillation  -Has been on flecainide 100 mg twice daily for several years  -On Eliquis 5 mg twice daily for cardioembolic risk reduction (started 4/2023)   -[id number]-[id number] score is 5 (agex2, HTN, hx CVA)  Hypertension  -BP remains inadequately controlled on lisinopril alone  -Increase lisinopril to 40 mg once daily and start chlorthalidone 25 mg once daily  -BMP + follow-up in 3 weeks. BP goal 130/80 or less  History of CVA  -Brain MRI completed 10/2021 without acute abnormalities but evidence of multiple small old strokes  -Seen by neurology 12/2021; consideration of chronic anticoagulation initiation discussed but deferred to cardiology/PCP. Was never initiated  Alcohol abuse  -Consuming 4-5 alcoholic beverages daily  -Cutting back/cessation encouraged      Changes today:   Increase lisinopril to 40 mg once daily  Start chlorthalidone 25 mg once daily    Follow-up with me in 3 weeks with labs prior for ongoing hypertension management.      Yadira Bullock PA-C  [facility]  Pager: [phone]          History of Presenting Illness:    Gavin House is a very pleasant 77 year old male with a history of paroxysmal atrial fibrillation, hypertension, prior CVA, and ongoing alcohol abuse.     He has historically done very well on flecainide 100 mg twice daily for atrial fibrillation suppression.  However, he has had episodes of recurrent atrial  fibrillation occurring in the setting of excess alcohol intake.  His last echocardiogram was completed in  revealing EF 55 to 60% with grade 1 diastolic dysfunction.  There was no significant valvular disease.    Unfortunately, patient had a stroke in late  and has since been struggling with memory difficulties.  At the time of his last clinic appointment with me 2023, we discussed risks and benefits of chronic anticoagulation initiation.  After discussion, they preferred to start Eliquis 5 mg twice daily.  Additionally, his blood pressure was markedly elevated at the time of his last appointment.  We increase his lisinopril to 30 mg once daily.    He presents to clinic today for routine follow-up.  Labs today reveal stable renal function and electrolytes. Denies shortness of breath, orthopnea and PND. Denies chest pain, palpitations, lightheadedness, dizziness, near syncope and syncope. Taking medications daily as prescribed      Blood pressure elevated at 160/72 in clinic today.  Does not have blood pressure cuff at home and therefore no way of monitoring.  Continuing to consume 4-5 alcoholic beverages per night.  Has not yet seen a primary care provider to discuss his unintentional 10 pound weight loss over the past 1 year.      Social History      Social History     Socioeconomic History    Marital status:      Spouse name: Not on file    Number of children: 2    Years of education: Not on file    Highest education level: Not on file   Occupational History    Occupation: Retired [employer]     Employer: [employer]   Tobacco Use    Smoking status: Former     Packs/day: 1.00     Years: 23.00     Pack years: 23.00     Types: Cigarettes     Start date:      Quit date:      Years since quittin.4    Smokeless tobacco: Never   Vaping Use    Vaping status: Not on file   Substance and Sexual Activity    Alcohol use: Yes     Alcohol/week: 0.0 standard drinks of alcohol     Comment: 2  "vodka drinks a day ... or more    Drug use: No    Sexual activity: Yes     Partners: Female   Other Topics Concern    Parent/sibling w/ CABG, MI or angioplasty before 65F 55M? No   Social History Narrative    , has 2 children, is retired from his manufacturing job as of age 68.  He goes to the bar daily to socialize and drink -- does not want to stop drinking.  (last updated 10/27/2019)      Social Determinants of Health     Financial Resource Strain: Not on file   Food Insecurity: Not on file   Transportation Needs: Not on file   Physical Activity: Not on file   Stress: Not on file   Social Connections: Not on file   Intimate Partner Violence: Not on file   Housing Stability: Not on file            Review of Systems:   Please see HPI         Physical Exam:   Vitals: BP (!) 160/72   Pulse 69   Ht 1.651 m (5' 5\")   Wt 47.6 kg (105 lb)   SpO2 98%   BMI 17.47 kg/m     Wt Readings from Last 4 Encounters:   04/05/23 48.5 kg (107 lb)   11/09/22 53.1 kg (117 lb)   03/29/22 48.6 kg (107 lb 3.2 oz)   12/03/21 49.7 kg (109 lb 9.6 oz)     GEN: Cachectic, in no acute distress.  HEENT:  Pupils equal, round. Sclerae nonicteric.   NECK: Supple, no masses appreciated.  No JVD.  C/V:  Regular rate and rhythm, no murmur appreciated  RESP: Respirations are unlabored. Clear to auscultation bilaterally without wheezing, rales, or rhonchi.  GI: Abdomen soft, nontender.  EXTREM: No LE edema.  NEURO: Alert and oriented, cooperative.  SKIN: Warm and dry.        Data:   LIPID RESULTS:  Lab Results   Component Value Date    CHOL 172 10/29/2021    CHOL 171 10/02/2018    HDL 49 10/29/2021    HDL 62 10/02/2018     (H) 10/29/2021    LDL 88 10/02/2018    TRIG 110 10/29/2021    TRIG 104 10/02/2018    CHOLHDLRATIO 2.5 12/03/2014     LIVER ENZYME RESULTS:  Lab Results   Component Value Date    AST 17 04/05/2023    AST 25 10/27/2019    ALT 6 (L) 04/05/2023    ALT 16 10/27/2019     CBC RESULTS:  Lab Results   Component Value Date    " WBC 5.1 10/29/2021    WBC 4.6 05/17/2021    RBC 4.35 (L) 10/29/2021    RBC 4.14 (L) 05/17/2021    HGB 14.1 10/29/2021    HGB 13.5 05/17/2021    HCT 42.8 10/29/2021    HCT 41.5 05/17/2021    MCV 98 10/29/2021     05/17/2021    MCH 32.4 10/29/2021    MCH 32.6 05/17/2021    MCHC 32.9 10/29/2021    MCHC 32.5 05/17/2021    RDW 12.4 10/29/2021    RDW 12.5 05/17/2021     10/29/2021     05/17/2021     BMP RESULTS:  Lab Results   Component Value Date     04/05/2023     05/17/2021    POTASSIUM 4.1 04/05/2023    POTASSIUM 3.7 10/29/2021    POTASSIUM 4.0 05/17/2021    CHLORIDE 103 04/05/2023    CHLORIDE 108 10/29/2021    CHLORIDE 104 05/17/2021    CO2 29 04/05/2023    CO2 28 10/29/2021    CO2 32 05/17/2021    ANIONGAP 9 04/05/2023    ANIONGAP 5 10/29/2021    ANIONGAP 2 (L) 05/17/2021    GLC 96 04/05/2023    GLC 91 10/29/2021    GLC 97 05/17/2021    BUN 7.3 (L) 04/05/2023    BUN 13 10/29/2021    BUN 10 05/17/2021    CR 0.88 04/05/2023    CR 0.84 05/17/2021    GFRESTIMATED 89 04/05/2023    GFRESTIMATED 86 05/17/2021    GFRESTBLACK >90 05/17/2021    SHELBY 9.0 04/05/2023    SHELBY 8.3 (L) 05/17/2021      A1C RESULTS:  Lab Results   Component Value Date    A1C 5.0 12/14/2021     INR RESULTS:  Lab Results   Component Value Date    INR 1.08 10/27/2019    INR 0.99 07/21/2008            Medications     Current Outpatient Medications   Medication Sig Dispense Refill    apixaban ANTICOAGULANT (ELIQUIS) 5 MG tablet Take 1 tablet (5 mg) by mouth 2 times daily 180 tablet 3    Ca Carbonate-Mag Hydroxide (ROLAIDS PO) Take by mouth as needed      EPINEPHrine 0.125 MG/ACT AERO Inhale 1 puff into the lungs daily as needed      flecainide (TAMBOCOR) 100 MG tablet Take 1 tablet (100 mg) by mouth 2 times daily Appointment required for further refills 180 tablet 3    lisinopril (ZESTRIL) 30 MG tablet Take 1 tablet (30 mg) by mouth daily 90 tablet 3    tamsulosin (FLOMAX) 0.4 MG capsule Take 1 capsule (0.4 mg) by mouth  "daily 90 capsule 3          Past Medical History     Past Medical History:   Diagnosis Date    Alcohol use     Allergic rhinitis, cause unspecified     Diverticulosis of colon     Diverticulitis 2010    Mild intermittent asthma     Paroxysmal atrial fibrillation (H) 2016    PVC (premature ventricular contraction)     intermittent bigeminy    Rosacea     Scoliosis (and kyphoscoliosis), idiopathic      Past Surgical History:   Procedure Laterality Date    [facility] NONSPECIFIC PROCEDURE  1974    right inguinal herniorraphy    [facility] NONSPECIFIC PROCEDURE  age 16    L inguinal herniorraphy     Family History   Problem Relation Age of Onset    Colon Cancer Father          of this age 69    Alcoholism Father     Heart Disease Mother         \"enlarged heart\"; d: age 75    C.A.D. Brother          in his 80's     Alcoholism Brother          in his 60's             Allergies   Penicillins      20 minutes spent on the date of the encounter doing chart review, history and exam, documentation and further activities as noted above    NATALY Bryant [facility]  Pager: [phone]        Thank you for allowing me to participate in the care of your patient.      Sincerely,     NATALY Bryant [facility]  cc:   Yadira Bullock PA-C  [address]  [place] [id number]        "

## 2023-06-07 NOTE — PROGRESS NOTES
Cardiology Clinic Progress Note  Gavin House MRN# [id number]   YOB: 1945 Age: 77 year old   Primary Cardiologist: With Dr. Verma; will need to cardiologist moving forward Reason for visit: Follow-up            Assessment and Plan:   Gavin House is a very pleasant 77 year old male who is here today for follow-up    Paroxysmal atrial fibrillation  -Has been on flecainide 100 mg twice daily for several years  -On Eliquis 5 mg twice daily for cardioembolic risk reduction (started 4/2023)   -[id number] score is 5 (agex2, HTN, hx CVA)  Hypertension  -BP remains inadequately controlled on lisinopril alone  -Increase lisinopril to 40 mg once daily and start chlorthalidone 25 mg once daily  -BMP + follow-up in 3 weeks. BP goal 130/80 or less  History of CVA  -Brain MRI completed 10/2021 without acute abnormalities but evidence of multiple small old strokes  -Seen by neurology 12/2021; consideration of chronic anticoagulation initiation discussed but deferred to cardiology/PCP. Was never initiated  Alcohol abuse  -Consuming 4-5 alcoholic beverages daily  -Cutting back/cessation encouraged      Changes today:   Increase lisinopril to 40 mg once daily  Start chlorthalidone 25 mg once daily    Follow-up with me in 3 weeks with labs prior for ongoing hypertension management.      Yadira Bullock PA-C  [facility]  Pager: [phone]          History of Presenting Illness:    Gavin House is a very pleasant 77 year old male with a history of paroxysmal atrial fibrillation, hypertension, prior CVA, and ongoing alcohol abuse.     He has historically done very well on flecainide 100 mg twice daily for atrial fibrillation suppression.  However, he has had episodes of recurrent atrial fibrillation occurring in the setting of excess alcohol intake.  His last echocardiogram was completed in 2016 revealing EF 55 to 60% with grade 1 diastolic dysfunction.  There was no significant valvular  disease.    Unfortunately, patient had a stroke in late  and has since been struggling with memory difficulties.  At the time of his last clinic appointment with me 2023, we discussed risks and benefits of chronic anticoagulation initiation.  After discussion, they preferred to start Eliquis 5 mg twice daily.  Additionally, his blood pressure was markedly elevated at the time of his last appointment.  We increase his lisinopril to 30 mg once daily.    He presents to clinic today for routine follow-up.  Labs today reveal stable renal function and electrolytes. Denies shortness of breath, orthopnea and PND. Denies chest pain, palpitations, lightheadedness, dizziness, near syncope and syncope. Taking medications daily as prescribed      Blood pressure elevated at 160/72 in clinic today.  Does not have blood pressure cuff at home and therefore no way of monitoring.  Continuing to consume 4-5 alcoholic beverages per night.  Has not yet seen a primary care provider to discuss his unintentional 10 pound weight loss over the past 1 year.      Social History      Social History     Socioeconomic History     Marital status:      Spouse name: Not on file     Number of children: 2     Years of education: Not on file     Highest education level: Not on file   Occupational History     Occupation: Retired [email or web address]     Employer: [employer]   Tobacco Use     Smoking status: Former     Packs/day: 1.00     Years: 23.00     Pack years: 23.00     Types: Cigarettes     Start date:      Quit date:      Years since quittin.4     Smokeless tobacco: Never   Vaping Use     Vaping status: Not on file   Substance and Sexual Activity     Alcohol use: Yes     Alcohol/week: 0.0 standard drinks of alcohol     Comment: 2 vodka drinks a day ... or more     Drug use: No     Sexual activity: Yes     Partners: Female   Other Topics Concern     Parent/sibling w/ CABG, MI or angioplasty before 65F 55M? No   Social  "History Narrative    , has 2 children, is retired from his manufacturing job as of age 68.  He goes to the bar daily to socialize and drink -- does not want to stop drinking.  (last updated 10/27/2019)      Social Determinants of Health     Financial Resource Strain: Not on file   Food Insecurity: Not on file   Transportation Needs: Not on file   Physical Activity: Not on file   Stress: Not on file   Social Connections: Not on file   Intimate Partner Violence: Not on file   Housing Stability: Not on file            Review of Systems:   Please see HPI         Physical Exam:   Vitals: BP (!) 160/72   Pulse 69   Ht 1.651 m (5' 5\")   Wt 47.6 kg (105 lb)   SpO2 98%   BMI 17.47 kg/m     Wt Readings from Last 4 Encounters:   04/05/23 48.5 kg (107 lb)   11/09/22 53.1 kg (117 lb)   03/29/22 48.6 kg (107 lb 3.2 oz)   12/03/21 49.7 kg (109 lb 9.6 oz)     GEN: Cachectic, in no acute distress.  HEENT:  Pupils equal, round. Sclerae nonicteric.   NECK: Supple, no masses appreciated.  No JVD.  C/V:  Regular rate and rhythm, no murmur appreciated  RESP: Respirations are unlabored. Clear to auscultation bilaterally without wheezing, rales, or rhonchi.  GI: Abdomen soft, nontender.  EXTREM: No LE edema.  NEURO: Alert and oriented, cooperative.  SKIN: Warm and dry.        Data:   LIPID RESULTS:  Lab Results   Component Value Date    CHOL 172 10/29/2021    CHOL 171 10/02/2018    HDL 49 10/29/2021    HDL 62 10/02/2018     (H) 10/29/2021    LDL 88 10/02/2018    TRIG 110 10/29/2021    TRIG 104 10/02/2018    CHOLHDLRATIO 2.5 12/03/2014     LIVER ENZYME RESULTS:  Lab Results   Component Value Date    AST 17 04/05/2023    AST 25 10/27/2019    ALT 6 (L) 04/05/2023    ALT 16 10/27/2019     CBC RESULTS:  Lab Results   Component Value Date    WBC 5.1 10/29/2021    WBC 4.6 05/17/2021    RBC 4.35 (L) 10/29/2021    RBC 4.14 (L) 05/17/2021    HGB 14.1 10/29/2021    HGB 13.5 05/17/2021    HCT 42.8 10/29/2021    HCT 41.5 05/17/2021 "    MCV 98 10/29/2021     05/17/2021    MCH 32.4 10/29/2021    MCH 32.6 05/17/2021    MCHC 32.9 10/29/2021    MCHC 32.5 05/17/2021    RDW 12.4 10/29/2021    RDW 12.5 05/17/2021     10/29/2021     05/17/2021     BMP RESULTS:  Lab Results   Component Value Date     04/05/2023     05/17/2021    POTASSIUM 4.1 04/05/2023    POTASSIUM 3.7 10/29/2021    POTASSIUM 4.0 05/17/2021    CHLORIDE 103 04/05/2023    CHLORIDE 108 10/29/2021    CHLORIDE 104 05/17/2021    CO2 29 04/05/2023    CO2 28 10/29/2021    CO2 32 05/17/2021    ANIONGAP 9 04/05/2023    ANIONGAP 5 10/29/2021    ANIONGAP 2 (L) 05/17/2021    GLC 96 04/05/2023    GLC 91 10/29/2021    GLC 97 05/17/2021    BUN 7.3 (L) 04/05/2023    BUN 13 10/29/2021    BUN 10 05/17/2021    CR 0.88 04/05/2023    CR 0.84 05/17/2021    GFRESTIMATED 89 04/05/2023    GFRESTIMATED 86 05/17/2021    GFRESTBLACK >90 05/17/2021    SHELBY 9.0 04/05/2023    SHELBY 8.3 (L) 05/17/2021      A1C RESULTS:  Lab Results   Component Value Date    A1C 5.0 12/14/2021     INR RESULTS:  Lab Results   Component Value Date    INR 1.08 10/27/2019    INR 0.99 07/21/2008            Medications     Current Outpatient Medications   Medication Sig Dispense Refill     apixaban ANTICOAGULANT (ELIQUIS) 5 MG tablet Take 1 tablet (5 mg) by mouth 2 times daily 180 tablet 3     Ca Carbonate-Mag Hydroxide (ROLAIDS PO) Take by mouth as needed       EPINEPHrine 0.125 MG/ACT AERO Inhale 1 puff into the lungs daily as needed       flecainide (TAMBOCOR) 100 MG tablet Take 1 tablet (100 mg) by mouth 2 times daily Appointment required for further refills 180 tablet 3     lisinopril (ZESTRIL) 30 MG tablet Take 1 tablet (30 mg) by mouth daily 90 tablet 3     tamsulosin (FLOMAX) 0.4 MG capsule Take 1 capsule (0.4 mg) by mouth daily 90 capsule 3          Past Medical History     Past Medical History:   Diagnosis Date     Alcohol use      Allergic rhinitis, cause unspecified      Diverticulosis of colon      "Diverticulitis 2010     Mild intermittent asthma      Paroxysmal atrial fibrillation (H) 2016     PVC (premature ventricular contraction)     intermittent bigeminy     Rosacea      Scoliosis (and kyphoscoliosis), idiopathic      Past Surgical History:   Procedure Laterality Date     [facility] NONSPECIFIC PROCEDURE  1974    right inguinal herniorraphy     Z NONSPECIFIC PROCEDURE  age 16    L inguinal herniorraphy     Family History   Problem Relation Age of Onset     Colon Cancer Father          of this age 69     Alcoholism Father      Heart Disease Mother         \"enlarged heart\"; d: age 75     C.A.D. Brother          in his 80's      Alcoholism Brother          in his 60's             Allergies   Penicillins      20 minutes spent on the date of the encounter doing chart review, history and exam, documentation and further activities as noted above    Yadira Bullock PA-C  [facility]  Pager: [phone]    "

## 2023-06-07 NOTE — PATIENT INSTRUCTIONS
Call the nurse for any questions or concerns at [phone]     Plan:  1. Medication changes:     INCREASE lisinopril to 40 mg once daily.     START chlorthalidone 25 mg once daily    2. Follow up with me in 3 weeks with labs prior.    -Scheduling phone number: [phone]    It was great seeing you today!    Yadira Bullock PA-C  Physician Assistant  [facility]

## 2023-06-27 PROBLEM — R07.9 CHEST PAIN, UNSPECIFIED TYPE: Status: ACTIVE | Noted: 2023-01-01

## 2023-06-27 PROBLEM — R00.0 WIDE-COMPLEX TACHYCARDIA: Status: ACTIVE | Noted: 2023-01-01

## 2023-06-27 NOTE — PROGRESS NOTES
Assessment & Plan     Tightness in chest    Patient started experiencing tightness in chest with higher heart rate  He states the pressure is a 3-4/10 pressure  There is also some tightness in chest without SOB    EKG shows st changes, tachycardia   There is concern for MI  EMS was immediately dispatched with lights and sirens  Patient was placed on oxygen 3 L Nasal Cannula  I did not initiate nitroglycerin due BP being only 93/63!  Also, ASA was not given as he is taking Eliquis and experiencing headache  Patient was taken to the [facility] ED via ambulance    - EKG 12-lead complete w/read - Clinics    Neck pain    Patient is experiencing tightness and pain in throat as well, states it radiates up his chest into his neck    Tachycardia    Patient is tachycardic at 144 without arrhythmia     Nonintractable headache, unspecified chronicity pattern, unspecified headache type    Patient also has developed a headache that is bothering him  States his headache is a 4/10 pain  He does have a hx of stroke    History of stroke    Patients hx, he states he has had 3-4 strokes in the past  With headache and stroke pt may need CT scan and or MRI    History of atrial fibrillation    Patient has hx of atrial fib and takes tambocor for this  He is not currently in atrial fib      Review of external notes as documented elsewhere in note       CONSULTATION/REFERRAL to via ambulance for MI     No follow-ups on file.    Bo Ochoa, [facility], PA-C  [facility]    Marsha Alonso is a 77 year old, presenting for the following health issues:  No chief complaint on file.         No data to display              HPI   Review of Systems   Constitutional, HEENT, cardiovascular, pulmonary, GI, , musculoskeletal, neuro, skin, endocrine and psych systems are negative, except as otherwise noted.      Objective    BP 93/63   Pulse (!) 144   Temp 98.9  F (37.2  C)   Resp 24   Wt 47.6 kg (105 lb)   SpO2 98%   BMI  17.47 kg/m    Body mass index is 17.47 kg/m .  Physical Exam   GENERAL: healthy, alert and no distress  EYES: Eyes grossly normal to inspection, PERRL and conjunctivae and sclerae normal  HENT: ear canals and TM's normal, nose and mouth without ulcers or lesions  NECK: no adenopathy, no asymmetry, masses, or scars and thyroid normal to palpation  RESP: lungs clear to auscultation - no rales, rhonchi or wheezes  CV: tachycardia  ABDOMEN: soft, nontender, no hepatosplenomegaly, no masses and bowel sounds normal  MS: no gross musculoskeletal defects noted, no edema  SKIN: no suspicious lesions or rashes  NEURO: Normal strength and tone, mentation intact and speech normal  PSYCH: mentation appears normal, affect normal/bright        No results found for any visits on 06/27/23.

## 2023-06-27 NOTE — Clinical Note
Hemodynamic equipment used: 5 lead ECG, SonalightK With 3 Leads, Machine BP Cuff and pulse oximeter probe.

## 2023-06-28 NOTE — PHARMACY-ADMISSION MEDICATION HISTORY
Pharmacy Intern Admission Medication History    Admission medication history is complete. The information provided in this note is only as accurate as the sources available at the time of the update.    Medication reconciliation/reorder completed by provider prior to medication history? No    Information Source(s): Family member and CareEverywhere/SureScripts via in-person    Pertinent Information: None    Changes made to PTA medication list:    Added: None    Deleted: None    Changed: None    Medication Affordability:  Not including over the counter (OTC) medications, was there a time in the past 3 months when you did not take your medications as prescribed because of cost?: No    Allergies reviewed with patient and updates made in EHR: yes    Medication History Completed By: Jonel Blum 6/27/2023 10:00 PM    Prior to Admission medications    Medication Sig Last Dose Taking? Auth Provider Long Term End Date   apixaban ANTICOAGULANT (ELIQUIS) 5 MG tablet Take 1 tablet (5 mg) by mouth 2 times daily 6/27/2023 at am Yes Yadira Bullock PA-C     chlorthalidone (HYGROTON) 25 MG tablet Take 1 tablet (25 mg) by mouth daily 6/27/2023 at am Yes Yadira Bullock PA-C Yes    flecainide (TAMBOCOR) 100 MG tablet Take 1 tablet (100 mg) by mouth 2 times daily Appointment required for further refills 6/27/2023 Yes Yadira Bullock PA-C Yes    lisinopril (ZESTRIL) 40 MG tablet Take 1 tablet (40 mg) by mouth daily 6/27/2023 at am Yes Yadira Bullock PA-C Yes    tamsulosin (FLOMAX) 0.4 MG capsule Take 1 capsule (0.4 mg) by mouth daily 6/26/2023 at pm Yes Cameron Kowalski MD     Ca Carbonate-Mag Hydroxide (ROLAIDS PO) Take by mouth as needed prn  Reported, Patient     EPINEPHrine 0.125 MG/ACT AERO Inhale 1 puff into the lungs daily as needed prn  Unknown, Entered By History

## 2023-06-28 NOTE — PLAN OF CARE
Goal Outcome Evaluation:    Pt denies chest pain or SOB, Up with SBA, voiding, Disoriented x3, Heparin gtt 500 units/hr. Plan for angiogram tomorrow.

## 2023-06-28 NOTE — H&P
[facility]    History and Physical  Hospitalist       Date of Admission:  6/27/2023  Date of Service (when I saw the patient): 06/27/23    ASSESSMENT  Gavin House is a markedly pleasant 77 year old gentleman with past medical history that is most notable for chronic atrial fibrillation on Eliquis, among others; who presents with chest pain and is found to have unstable wide complex tachycardia and myonecrosis, concerning for suspected ventricular tachycardia. He was cardioverted successfully in the ED.    PLAN     Suspected ventricular tachycardia causing ACS: Of note, Mr. House has followed by [facility] for chronic paroxysmal atrial fibrillation, on Eliquis (given CHADS2 Vasc score of 5) and Flecainide. Prior notes document concern for daily drinking, and possible severe alcohol use disorder. TTE in 2016 showed preserved LVEF. Stress test in 2016 was negative for signs of inducible ischemia, and a Zio patch in 2016 showed frequent PVC's. Last, note is made of a neuropsychiatric assessment in 2021 which documented concern for chronic dementia.    Now, he presents with chest pain. In the ED, he initially had wide complex tachycardia to 140, with SBP less than 100. He was successfully cardioverted and is now in NSR. He has mild myonecrosis. CXR shows a small left pleural effusion. K, Mag, and TSH levels are normal. Overall, his presentation is consistent with ACS and ventricular tachycardia; vs possible symptomatic atrial fibrillation with aberrancy. Either arrhythmia could have been triggered by ETOH use.      -- Inpatient. NPO. Telemetry, serial enzymes, TTE ordered. Cardiology consulted. Heparin infusion continued (it has been verified that his last dose of Eliquis was the AM of 6/26). Flecanide held for now pending Cardiology review in AM.    -- Monitor K and Mag closely    ANANTH: Suspect due to hypovolemia. IVF given in the ED. Repeat BMP in AM.    Recent Labs   Lab Test  06/27/23  1944 06/07/23  1056 04/05/23  1232   CR 1.24* 0.90 0.88     Possible severe alcohol use disorder: he denies use of ETOH today to me.   -- Thiamine, Folate, MVI ordered. Monitor very closely for ETOH withdrawal while hospitalized.    Chronic dementia: As above.    -- Melatonin ordered. Monitor very closely for sundowning while hospitalized    History of multiple prior strokes: reportedly seen on prior MRI scans as chronic infarcts. Noted.    Hypertension: Resume home Lisinopril and Chlorthalidone when verified and once ANANTH has resolved    I have spent 85 minutes on the date of service doing chart review, history, examination, documentation, and further activities per the note.    Chief Complaint   Chest pain    History is obtained from the patient and the ED physician whom I have spoken with    History of Present Illness   Gavin House is a markedly pleasant 77 year old gentleman who presents with chest pain. History provision from him is challenging as he evidently has some significant short and long term memory loss. At first, he denies ever having chest pain but on further questioning he does say that intermittently, for the past few days he has noticed poorly characterized discomfort in the lower chest wall, radiating to the upper abdomen, that has been intermittent. He says that earlier today he was playing with two rescue dogs that he and his wife have recently acquired, and he is unsure if this activity may have exacerbated the symptoms. In any event at some point earlier today his pain became worse enough for him to complain about it and he went to an Urgent Care for further evaluation, and was sent in here after an EKG revealed wide complex tachycardia. Here in the ED, initial EKG showed wide complex tachycardia to 138. He underwent synchronized cardioversion at 150 Joules which was successful. Follow up EKG showed NSR. He denies any pain now. He says he can not remember if he has felt shortness  "of breath, palpitations, light headedness, or nausea earlier today. He denies any recent decrease in energy level or leg swelling. He has a chronic mild intermittently productive cough. On my interview he denies any recent ETOH use. Otherwise he denies any other acute complaints.    In the ED,   06/27 1938 99/74 98.1  F (36.7  C) 140  25 99 %     CBC and BMP were notable for Na 135, BUN 17.2, Cr 1.24, Ca 8.6, otherwise were within the normal reference range. K was 3.8, Mag 2.1. Glucose was 144. TSH was 2.64. Troponin T was 31. Ethyl alcohol level was negative.    Heparin infusion has been started and he has also been given Thiamine, Folate, MVI, and IV fluid in the ED.    Recent Results (from the past 24 hour(s))   Chest XR,  PA & LAT    Narrative    EXAM: XR CHEST 2 VIEWS  LOCATION: [facility]  DATE: 6/27/2023    INDICATION: chest pain  COMPARISON: No prior studies currently available for obstruction.      Impression    IMPRESSION: Heart size within normal limits. Pulmonary vascularity normal. Emphysema. Scarring right lung base. Small left pleural effusion and atelectasis or infiltrate left base. Upper lung fields are clear. Densely calcified mediastinal lymph nodes.   Convex right thoracolumbar scoliosis.       PHYSICAL EXAM  Blood pressure 95/58, pulse 81, temperature 98.4  F (36.9  C), temperature source Temporal, resp. rate 26, height 1.727 m (5' 8\"), weight 47.6 kg (105 lb), SpO2 98 %.  Constitutional: Alert and oriented to person, but not to place or time; no apparent distress  HEENT: moist mucus membranes  Respiratory: lungs clear to auscultation bilaterally  Cardiovascular: regular S1 S2  GI: abdomen soft non tender non distended bowel sounds positive  Musculoskeletal: no clubbing, cyanosis or edema     DVT Prophylaxis: Heparin IV  Code Status: Full Code    Disposition: Expected discharge in 2-3 days    Mike Gunter MD, MD    Past Medical History    I have reviewed this " patient's medical history and updated it with pertinent information if needed.   Past Medical History:   Diagnosis Date     Alcohol use      Allergic rhinitis, cause unspecified      Diverticulosis of colon     Diverticulitis 5/2010     HTN (hypertension)      Memory loss      Mild intermittent asthma      Paroxysmal atrial fibrillation (H) 05/09/2016     Pneumothorax      PVC (premature ventricular contraction)     intermittent bigeminy     Rosacea      Scoliosis (and kyphoscoliosis), idiopathic      Stroke (H)        Past Surgical History   I have reviewed this patient's surgical history and updated it with pertinent information if needed.  Past Surgical History:   Procedure Laterality Date     [facility] NONSPECIFIC PROCEDURE  1974    right inguinal herniorraphy     [facility] NONSPECIFIC PROCEDURE  age 16    L inguinal herniorraphy       Prior to Admission Medications   Prior to Admission Medications   Prescriptions Last Dose Informant Patient Reported? Taking?   Ca Carbonate-Mag Hydroxide (ROLAIDS PO)   Yes No   Sig: Take by mouth as needed   EPINEPHrine 0.125 MG/ACT AERO  Spouse/Significant Other Yes No   Sig: Inhale 1 puff into the lungs daily as needed   apixaban ANTICOAGULANT (ELIQUIS) 5 MG tablet   No No   Sig: Take 1 tablet (5 mg) by mouth 2 times daily   chlorthalidone (HYGROTON) 25 MG tablet   No No   Sig: Take 1 tablet (25 mg) by mouth daily   flecainide (TAMBOCOR) 100 MG tablet   No No   Sig: Take 1 tablet (100 mg) by mouth 2 times daily Appointment required for further refills   lisinopril (ZESTRIL) 40 MG tablet   No No   Sig: Take 1 tablet (40 mg) by mouth daily   tamsulosin (FLOMAX) 0.4 MG capsule   No No   Sig: Take 1 capsule (0.4 mg) by mouth daily      Facility-Administered Medications: None     Allergies   Allergies   Allergen Reactions     Penicillins        Social History   I have reviewed this patient's social history and updated it with pertinent information if needed. Gavin House  reports that he  "quit smoking about 33 years ago. His smoking use included cigarettes. He started smoking about 56 years ago. He has a 23.00 pack-year smoking history. He has never used smokeless tobacco. He reports current alcohol use. He reports that he does not use drugs.    Family History   Family history assessed and, except as above, is non-contributory.    Family History   Problem Relation Age of Onset     Colon Cancer Father          of this age 69     Alcoholism Father      Heart Disease Mother         \"enlarged heart\"; d: age 75     C.A.D. Brother          in his 80's      Alcoholism Brother          in his 60's        Review of Systems   The 10 point Review of Systems is negative other than noted in the HPI or here.     Primary Care Physician   Kash Eller    Data   Labs Ordered and Resulted from Time of ED Arrival to Time of ED Departure   BASIC METABOLIC PANEL - Abnormal       Result Value    Sodium 135 (*)     Potassium 3.8      Chloride 98      Carbon Dioxide (CO2) 27      Anion Gap 10      Urea Nitrogen 17.2      Creatinine 1.24 (*)     Calcium 8.6 (*)     Glucose 144 (*)     GFR Estimate 60 (*)    TROPONIN T, HIGH SENSITIVITY - Abnormal    Troponin T, High Sensitivity 31 (*)    TSH WITH FREE T4 REFLEX - Normal    TSH 2.64     MAGNESIUM - Normal    Magnesium 2.1     ETHYL ALCOHOL LEVEL - Normal    Alcohol ethyl <0.01     CBC WITH PLATELETS AND DIFFERENTIAL    WBC Count 6.8      RBC Count 4.50      Hemoglobin 14.5      Hematocrit 43.2      MCV 96      MCH 32.2      MCHC 33.6      RDW 11.6      Platelet Count 277      % Neutrophils 74      % Lymphocytes 13      % Monocytes 10      % Eosinophils 2      % Basophils 1      % Immature Granulocytes 0      NRBCs per 100 WBC 0      Absolute Neutrophils 5.0      Absolute Lymphocytes 0.9      Absolute Monocytes 0.7      Absolute Eosinophils 0.2      Absolute Basophils 0.1      Absolute Immature Granulocytes 0.0      Absolute NRBCs 0.0         Data reviewed " today:  I personally reviewed the EKG tracing showing wide complex tachycardia and the chest x-ray image(s) showing minimal left lower lobe effusion.

## 2023-06-28 NOTE — PLAN OF CARE
Neuro: Alert, chronic demential disoriented to time and situation  Tele/cardiac: SR with PVCs  Respiration: on RA  Activity: SBA  Pain: denies  Drips: heparin @700 units/hr, next PTT recheck at 9.30am today  Drains/tubes: none  Skin: intact  GI/: continent, voiding adequately in bathroom, NPO  Aggression color: green  Isolation: none  Plan: TTE, cardiologist consult

## 2023-06-28 NOTE — ED NOTES
[facility]  ED Nurse Handoff Report    ED Chief complaint: Chest Pain      ED Diagnosis:   Final diagnoses:   Wide-complex tachycardia   Chest pain, unspecified type       Code Status: Full Code    Allergies:   Allergies   Allergen Reactions     Penicillins        Patient Story: pt sent from . Presents with chest tightness that radiates to his neck. Pt was given nitroglycerin and his pain subsided a little bit   Focused Assessment:  Pt noted to be in v fib on arrival moved to stab A and cardioverted with sedation.  Pt trop is 31 IV heparin started pt endrses etoh use etoh vitamins given     Treatments and/or interventions provided: ekg blood work cardiversion   Patient's response to treatments and/or interventions: denies and pain     To be done/followed up on inpatient unit:  cadiac follow up     Does this patient have any cognitive concerns?: none     Activity level - Baseline/Home:  Independent  Activity Level - Current:   Stand with Assist    Patient's Preferred language: English   Needed?: No    Isolation: None  Infection: Not Applicable  Patient tested for COVID 19 prior to admission: NO  Bariatric?: No    Vital Signs:   Vitals:    06/27/23 2005 06/27/23 2010 06/27/23 2100 06/27/23 2107   BP: 108/79  95/58    Pulse: 83 81 78 81   Resp: (!) 34 23 14 26   Temp: 98.4  F (36.9  C)      TempSrc: Temporal      SpO2: 97% 98% 97% 98%   Weight:       Height:           Cardiac Rhythm:Cardiac Rhythm: Ventricular tachycardia    Was the PSS-3 completed:   Yes  What interventions are required if any?               Family Comments: concern for cardiac problems   OBS brochure/video discussed/provided to patient/family: N/A              Name of person given brochure if not patient:               Relationship to patient:     For the majority of the shift this patient's behavior was Green.   Behavioral interventions performed were none .    ED NURSE PHONE NUMBER: ED RN

## 2023-06-28 NOTE — CONSULTS
[facility] EP Consultation     Date of Admission:  6/27/2023  Date of Consult: 06/28/23  Requesting Physician: Dr. Trejo  Primary care physician: Kash Eller  Primary cardiologist: Sisi Verma (EP)  Staff Cardiologist:  Dr. Fernandes     Reason for consult: WCT    Assessment:   Gavin House is a 77 year old male with a history of paroxysmal AF on flecainide and Eliquis (CUR0QA4-LGGw score 5), hx of CVA's, hypertension, history of alcohol abuse and dementia, who was admitted on 6/27/2023 with chest pain and a wide complex tachycardia, cardioverted successfully to normal sinus rhythm.  At this point, it is unclear whether this represents ventricular tachycardia, or 2:1 atrial flutter with aberrancy.  Atrial flutter is made more likely due to the fact this patient is on flecainide and no beta-blocker therapy, as well as the fact that the present morphology varies from his previously noted PVCs.  However, as he did have chest discomfort, troponin rise and anteroseptal T wave abnormalities in association with his arrhythmia, I think it is pertinent to rule out obstructive coronary artery disease.  As his last dose of Eliquis was yesterday morning, we will defer this to tomorrow.  Will await echocardiogram, and repeat troponin and EKG today.  In the interim, I will add metoprolol 25 mg twice daily to his regimen.  His PTA lisinopril should be reinitiated for blood pressure control.  He is currently very comfortable, and will notify us if that changes.     Thank you very much for this consultation.  Please call with any questions or concerns.    Alena Epstein PA-C  [facility]  Pager: [phone]  Text Page  (7:30am - 4pm M-F)   ________________________________________________________________________  History of Present Illness   Gavin House is a 77 year old male with a history of paroxysmal AF on flecainide and Eliquis ([id number] score 5), hx of CVA's,  hypertension, history of alcohol abuse and dementia, who presents with chest discomfort and a wide-complex tachycardia with a left bundle branch block morphology  ms, rate around 150 bpm, felt to be unstable due to the presence of chest discomfort and hypotension with an SBP of under 100.  He was successfully cardioverted to normal sinus rhythm, postconversion EKG did have some anteroseptal T wave inversion noted, otherwise no ST/T wave abnormalities and a QRS of 90 ms.  His troponin initially was just slightly elevated above normal at 31, post cardioversion rising to 131.  Potassium, magnesium, TSH levels normal.  At present, he denies any symptoms, states he feels back to baseline.  Of note, he has dementia and is therefore poor historian.  His history was taken with assistance of his wife who is at his bedside. He was relaxing and watching television at the time of symptom onset, but cannot recall any other details about his situation. Initially, he tells me that he had been feeling very well prior to presentation, with absolutely no cardiovascular symptoms, but then mentions that he has been feeling intermittent chest tightness.   Overall, he is fairly sedentary.  He does walk the dog routinely without issue.  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  He has been compliant with his medications, with the assistance of his wife. Currently on heparin gtt per primary team.     Prior cardiac studies from 2016 include an echocardiogram showing normal biventricular function, normal biatrial size, trace to mild MR.  Treadmill stress test was negative for inducible ischemia, with no change in QRS duration on flecainide. 14-day ZIO monitor showed normal sinus rhythm with occasional monomorphic PVCs, burden of just under 2%.  Of note, his wide-complex tachycardia in the hospital is a different morphology than his previously noted  PVCs.  ___________________________________________________________  Code Status    Full Code    Past Medical History   I have reviewed this patient's medical history and updated it with pertinent information if needed.   Past Medical History:   Diagnosis Date     Alcohol use      Allergic rhinitis, cause unspecified      Diverticulosis of colon     Diverticulitis 5/2010     HTN (hypertension)      Memory loss      Mild intermittent asthma      Paroxysmal atrial fibrillation (H) 05/09/2016     Pneumothorax      PVC (premature ventricular contraction)     intermittent bigeminy     Rosacea      Scoliosis (and kyphoscoliosis), idiopathic      Stroke (H)        Past Surgical History   I have reviewed this patient's surgical history and updated it with pertinent information if needed.  Past Surgical History:   Procedure Laterality Date     [facility] NONSPECIFIC PROCEDURE  1974    right inguinal herniorraphy     [facility] NONSPECIFIC PROCEDURE  age 16    L inguinal herniorraphy       Prior to Admission Medications   Prior to Admission Medications   Prescriptions Last Dose Informant Patient Reported? Taking?   Ca Carbonate-Mag Hydroxide (ROLAIDS PO) prn Spouse/Significant Other Yes No   Sig: Take by mouth as needed   EPINEPHrine 0.125 MG/ACT AERO prn Spouse/Significant Other Yes No   Sig: Inhale 1 puff into the lungs daily as needed   apixaban ANTICOAGULANT (ELIQUIS) 5 MG tablet 6/27/2023 at am Spouse/Significant Other No Yes   Sig: Take 1 tablet (5 mg) by mouth 2 times daily   chlorthalidone (HYGROTON) 25 MG tablet 6/27/2023 at am Spouse/Significant Other No Yes   Sig: Take 1 tablet (25 mg) by mouth daily   flecainide (TAMBOCOR) 100 MG tablet 6/27/2023 Spouse/Significant Other No Yes   Sig: Take 1 tablet (100 mg) by mouth 2 times daily Appointment required for further refills   lisinopril (ZESTRIL) 40 MG tablet 6/27/2023 at am Spouse/Significant Other No Yes   Sig: Take 1 tablet (40 mg) by mouth daily   tamsulosin (FLOMAX) 0.4 MG  "capsule 2023 at pm Spouse/Significant Other No Yes   Sig: Take 1 capsule (0.4 mg) by mouth daily      Facility-Administered Medications: None     Allergies   Allergies   Allergen Reactions     Penicillins        Social History   I have reviewed this patient's social history and updated it with pertinent information if needed. Gavin House  reports that he quit smoking about 33 years ago. His smoking use included cigarettes. He started smoking about 56 years ago. He has a 23.00 pack-year smoking history. He has never used smokeless tobacco. He reports current alcohol use. He reports that he does not use drugs.    Family History   I have reviewed this patient's family history and updated it with pertinent information if needed.   Family History   Problem Relation Age of Onset     Colon Cancer Father          of this age 69     Alcoholism Father      Heart Disease Mother         \"enlarged heart\"; d: age 75     C.A.D. Brother          in his 80's      Alcoholism Brother          in his 60's        Review of Systems   A comprehensive review of systems is negative, except for as noted in the HPI.    Physical Exam   Temp: 98.6  F (37  C) Temp src: Axillary BP: (!) 166/84 Pulse: 73   Resp: 24 SpO2: 98 % O2 Device: None (Room air)    Vital Signs with Ranges  Temp:  [98.1  F (36.7  C)-98.9  F (37.2  C)] 98.6  F (37  C)  Pulse:  [] 73  Resp:  [10-34] 24  BP: ()/(58-91) 166/84  SpO2:  [96 %-100 %] 98 %  105 lbs 4.8 oz    General - Alert and oriented to time place and person in no acute distress  Eyes - No scleral icterus  HEENT - Neck supple, moist mucous membranes  Cardiovascular - RRR, no murmurs.  Extremities - There is no edema  Respiratory - Soft LLL crackles, otherwise CTAB.  Skin - No pallor or cyanosis  Gastrointestinal - Non tender and non distended without rebound or guarding  Psych - Appropriate affect   Neurological - No gross motor neurological focal deficits    Data   Most Recent 3 " "CBC's:Recent Labs   Lab Test 06/28/23  0919 06/28/23  0232 06/27/23 1944   WBC 4.7 5.2 6.8   HGB 13.6 13.0* 14.5   MCV 94 95 96    222 277     Most Recent 3 BMP's:Recent Labs   Lab Test 06/28/23  0919 06/27/23  1944 06/07/23  1056    135* 139   POTASSIUM 3.9 3.8 3.8   CHLORIDE 103 98 101   CO2 24 27 28   BUN 11.4 17.2 5.8*   CR 1.11 1.24* 0.90   ANIONGAP 10 10 10   SHELBY 8.8 8.6* 9.2   GLC 94 144* 94     Most Recent 2 LFT's:Recent Labs   Lab Test 04/05/23  1232 10/29/21  0833   AST 17 15   ALT 6* 16   ALKPHOS 100 78   BILITOTAL 0.7 0.6     Most Recent TSH and T4:Recent Labs   Lab Test 06/27/23 1944   TSH 2.64       Clinically Significant Risk Factors Present on Admission               # Drug Induced Coagulation Defect: home medication list includes an anticoagulant medication    # Hypertension: Noted on problem list      # Cachexia: Estimated body mass index is 16.01 kg/m  as calculated from the following:    Height as of this encounter: 1.727 m (5' 8\").    Weight as of this encounter: 47.8 kg (105 lb 4.8 oz).           Cardiac Arrhythmia: Paroxysmal tachycardia, unspecified  Ventricular premature depolarization (PVC)    Acute kidney failure, unspecified    Dementia: Unspecified dementia without behavioral disturbance    Chronic Fatigue and Other Debilities: Chronic fatigue, unspecified      "

## 2023-06-28 NOTE — PROGRESS NOTES
[facility]    Medicine Progress Note - Hospitalist Service    Date of Admission:  6/27/2023    Assessment & Plan   Gavin House is a markedly pleasant 77 year old gentleman with past medical history that is most notable for chronic atrial fibrillation on Eliquis, among others; who presents with chest pain and is found to have unstable wide complex tachycardia and myonecrosis, concerning for suspected ventricular tachycardia. He was cardioverted successfully in the ED.    Suspected ventricular tachycardia causing ACS  Chronic paroxysmal atrial fibrillation  Of note, Mr. House has followed by [facility] for chronic paroxysmal atrial fibrillation, on Eliquis (given CHADS2 Vasc score of 5) and Flecainide. Prior notes document concern for daily drinking, and possible severe alcohol use disorder. TTE in 2016 showed preserved LVEF. Stress test in 2016 was negative for signs of inducible ischemia, and a Zio patch in 2016 showed frequent PVC's. Last, note is made of a neuropsychiatric assessment in 2021 which documented concern for chronic dementia.  Now, he presents with chest pain. In the ED, he initially had wide complex tachycardia to 140, with SBP less than 100. He was successfully cardioverted and is now in NSR. He has mild myonecrosis (Trop 31--131--90). CXR shows a small left pleural effusion. K, Mag, and TSH levels are normal. Overall, his presentation is consistent with ACS and ventricular tachycardia; vs possible symptomatic atrial fibrillation with aberrancy. Either arrhythmia could have been triggered by ETOH use.  * 6/28 Echo: EF 55-60%, normal LV wall motion. RV normal. No significant valve dysfunction. No pericardial effusion. IVC normal  - regular diet, NPO at midnight.   - cardiology appreciated  - heparin gtt  - flecainide STOPPED, consideration for initiation of sotalol after 48h washout  - PTA lisinopril 40mg daily resumed  - lopressor 25mg BID added 6/28  - cardiac cath  "on 6/29     ANANTH:   Suspect due to hypovolemia. IVF given in the ED. Baseline Cr 0.9. Cr 1.24 on admit  - CR improved to 1.1 on 6/28, PTA chlorthalidone on hold      Possible severe alcohol use disorder  - Thiamine, Folate, MVI ordered.  - monitor for alcohol withdrawal.     Chronic dementia: As above.    -- Melatonin ordered. Monitor very closely for sundowning while hospitalized     History of multiple prior strokes  Reportedly seen on prior MRI scans as chronic infarcts. Noted.     Hypertension  - PTA lisinopril resumed, chlorthalidone remains on hold       Diet: Regular Diet Adult  Room Service  NPO per Anesthesia Guidelines for Procedure/Surgery Except for: Meds    DVT Prophylaxis: Heparin gtt  Moore Catheter: Not present  Lines: None     Cardiac Monitoring: ACTIVE order. Indication: AMI (NSTEMI/ STEMI) (48 hours)  Code Status: Full Code      Clinically Significant Risk Factors Present on Admission               # Drug Induced Coagulation Defect: home medication list includes an anticoagulant medication    # Hypertension: Noted on problem list      # Cachexia: Estimated body mass index is 16.01 kg/m  as calculated from the following:    Height as of this encounter: 1.727 m (5' 8\").    Weight as of this encounter: 47.8 kg (105 lb 4.8 oz).            Disposition Plan      Expected Discharge Date: 06/29/2023                  Fern Horan DO  Hospitalist Service  [facility]  Securely message with NoteWagon (more info)  Text page via [employer] Paging/Directory   ______________________________________________________________________    Interval History   Patient seen and examined. He is doing well. Wife is at bedside. Reviewed plan of care for day and tomorrow. He currently denies any discomfort in his chest, shortness of breath or lightheadedness with walking. Discussed with RN    Physical Exam   Vital Signs: Temp: 98.6  F (37  C) Temp src: Axillary BP: (!) 166/84 Pulse: 73   Resp: 24 SpO2: 98 % " O2 Device: None (Room air)    Weight: 105 lbs 4.8 oz    Constitutional: Awake, alert, cooperative, no apparent distress  Respiratory: Clear to auscultation bilaterally, no crackles or wheezing  Cardiovascular: Regular rate and rhythm, normal S1 and S2, and no murmur noted  GI: Normal bowel sounds, soft, non-distended, non-tender  Skin/Integumen: No rashes, no cyanosis, no edema  Other: Mild confusion    Medical Decision Making       35 MINUTES SPENT BY ME on the date of service doing chart review, history, exam, documentation & further activities per the note.      Data     I have personally reviewed the following data over the past 24 hrs:    4.7  \   13.6   / 217     137 103 11.4 /  94   3.9 24 1.11 \       Trop: 90 (H) BNP: N/A       TSH: 2.64 T4: N/A A1C: N/A       INR:  N/A PTT:  82 (H)   D-dimer:  N/A Fibrinogen:  N/A       Imaging results reviewed over the past 24 hrs:   Recent Results (from the past 24 hour(s))   Chest XR,  PA & LAT    Narrative    EXAM: XR CHEST 2 VIEWS  LOCATION: [facility]  DATE: 6/27/2023    INDICATION: chest pain  COMPARISON: No prior studies currently available for obstruction.      Impression    IMPRESSION: Heart size within normal limits. Pulmonary vascularity normal. Emphysema. Scarring right lung base. Small left pleural effusion and atelectasis or infiltrate left base. Upper lung fields are clear. Densely calcified mediastinal lymph nodes.   Convex right thoracolumbar scoliosis.

## 2023-06-28 NOTE — ED TRIAGE NOTES
Sent from ;  Noted to have chest tightness that radiates to his neck.  Pt given 1 nitroglycerin and it stated it help his pain subside a little.        Triage Assessment     Row Name 06/27/23 1941       Triage Assessment (Adult)    Airway WDL WDL       Respiratory WDL    Respiratory WDL WDL       Skin Circulation/Temperature WDL    Skin Circulation/Temperature WDL WDL       Cardiac WDL    Cardiac WDL X;rhythm    Pulse Rate & Regularity tachycardic    Cardiac Rhythm VT       Peripheral/Neurovascular WDL    Peripheral Neurovascular WDL WDL       Cognitive/Neuro/Behavioral WDL    Cognitive/Neuro/Behavioral WDL WDL

## 2023-06-28 NOTE — ED PROVIDER NOTES
"  History     Chief Complaint:  Chest Pain       The history is provided by the patient.      Gaivn House is a 77 year old male with a history of 4 strokes, AFIB (anticoagulated on Eliquis), and presents from urgent care with chest pain. He describes his chest to feel tight and complains of shortness of breath. Patient reports feeling fine before bed last night. He states he had mild chest pain throughout the day today, but symptoms worsened around 1700 today.  Seen in urgent care and transferred to the ED by EMS after EKG demonstrated wide-complex tachycardia.  He admits to alcohol use, and did have a drink today. Denies any history of COPD, CHF, or previous MI.    Independent Historian:   None - Patient Only    Review of External Notes:   I reviewed the note from urgent care from earlier today.    Medications:    ANTICOAGULANT (ELIQUIS) 5 MG tablet  Ca Carbonate-Mag Hydroxide   chlorthalidone   EPINEPHrine   flecainide   lisinopril   tamsulosin     Past Medical History:    Alcohol use  Diverticulosis  Asthma  Paroxysmal a-fib  PVC  Rosacea  Scoliosis     Past Surgical History:    Inguinal herniorrhaphy  (x2)    Physical Exam     Patient Vitals for the past 24 hrs:   BP Temp Temp src Pulse Resp SpO2 Height Weight   06/27/23 2107 -- -- -- 81 26 98 % -- --   06/27/23 2100 95/58 -- -- 78 14 97 % -- --   06/27/23 2010 -- -- -- 81 23 98 % -- --   06/27/23 2005 108/79 98.4  F (36.9  C) Temporal 83 (!) 34 97 % -- --   06/27/23 2000 (!) 118/91 -- -- 90 16 100 % -- --   06/27/23 1959 -- -- -- 85 10 100 % -- --   06/27/23 1959 -- -- -- -- 21 -- -- --   06/27/23 1956 -- -- -- (!) 137 20 100 % -- --   06/27/23 1948 101/74 -- -- (!) 141 20 100 % -- --   06/27/23 1938 99/74 98.1  F (36.7  C) Temporal (!) 140 25 99 % 1.727 m (5' 8\") 47.6 kg (105 lb)        Physical Exam  General: Alert and cooperative with exam. Patient in moderate distress.  Baseline mentation. Thin, frail appearance.  Head:  Scalp is NC/AT  Eyes:  No scleral " icterus, PERRL  ENT:  The external nose and ears are normal. The oropharynx is normal and without erythema; mucus membranes are moist. Uvula midline, no evidence of deep space infection.  Neck:  Normal range of motion without rigidity.  CV:  Tachycardic rate and regular rhythm  Resp:  Breath sounds are clear bilaterally    Non-labored, no retractions or accessory muscle use  GI:  Abdomen is soft, no distension, no tenderness. No peritoneal signs  MS:  No lower extremity edema   Skin:  Warm and dry, No rash or lesions noted.  Neuro: Alert. No gross motor deficits.    Emergency Department Course   ECG 1  ECG taken at 1927, ECG read at 1927   V Tach vs. Atrial flutter 2:1 with aberrancy   Rate 147 bpm. MT interval 120 ms. QRS duration 168 ms. QT/QTc 308/482 ms. P-R-T axes 38 -11 255    ECG 2  ECG taken at 1949, ECG read at 1949  V Tach vs. Atrial flutter 2:1 with aberrancy   Rate 138 bpm. MT interval * ms. QRS duration 182 ms. QT/QTc 392/593 ms. P-R-T axes * -53 107.     ECG results from 06/27/23   EKG 12-lead, tracing only     Value    Systolic Blood Pressure     Diastolic Blood Pressure     Ventricular Rate 84    Atrial Rate 84    MT Interval 218    QRS Duration 90        QTc 477    P Axis 67    R AXIS 82    T Axis 55    Interpretation ECG      Sinus rhythm with 1st degree A-V block  Possible Left atrial enlargement  ST & T wave abnormality, consider anterior ischemia  Abnormal ECG  Confirmed by GENERATED REPORT, COMPUTER ([id number]),  Momo Fraser ([id number]) on 6/27/2023 8:14:39 PM            Imaging:  Chest XR,  PA & LAT   Final Result   IMPRESSION: Heart size within normal limits. Pulmonary vascularity normal. Emphysema. Scarring right lung base. Small left pleural effusion and atelectasis or infiltrate left base. Upper lung fields are clear. Densely calcified mediastinal lymph nodes.    Convex right thoracolumbar scoliosis.         Report per radiology    Laboratory:  Labs Ordered and Resulted from Time of  ED Arrival to Time of ED Departure   BASIC METABOLIC PANEL - Abnormal       Result Value    Sodium 135 (*)     Potassium 3.8      Chloride 98      Carbon Dioxide (CO2) 27      Anion Gap 10      Urea Nitrogen 17.2      Creatinine 1.24 (*)     Calcium 8.6 (*)     Glucose 144 (*)     GFR Estimate 60 (*)    TROPONIN T, HIGH SENSITIVITY - Abnormal    Troponin T, High Sensitivity 31 (*)    TSH WITH FREE T4 REFLEX - Normal    TSH 2.64     MAGNESIUM - Normal    Magnesium 2.1     ETHYL ALCOHOL LEVEL - Normal    Alcohol ethyl <0.01     CBC WITH PLATELETS AND DIFFERENTIAL    WBC Count 6.8      RBC Count 4.50      Hemoglobin 14.5      Hematocrit 43.2      MCV 96      MCH 32.2      MCHC 33.6      RDW 11.6      Platelet Count 277      % Neutrophils 74      % Lymphocytes 13      % Monocytes 10      % Eosinophils 2      % Basophils 1      % Immature Granulocytes 0      NRBCs per 100 WBC 0      Absolute Neutrophils 5.0      Absolute Lymphocytes 0.9      Absolute Monocytes 0.7      Absolute Eosinophils 0.2      Absolute Basophils 0.1      Absolute Immature Granulocytes 0.0      Absolute NRBCs 0.0          Procedures:      Sedation     Procedure: Procedural Sedation    Sedation Level: Moderate    Indication: Cardioversion    Consent: Written from Patient     Universal protocol: Universal protocol was followed and time out conducted just prior to starting procedure, confirming patient identity, site/side, procedure, patient position, and availability of correct equipment and implants.     Last PO Intake: Emergent procedure    ASA Class: Class 2 - A patient with mild systemic disease     Exam:  Mallampati:  Grade 1 - Soft palate, uvula, and pillars visible    Lungs: Clear   Heart: Ventricular tachycardia      Medication: Propofol  Dose: 60 mg     Monitoring:  Monitoring consisted of heart rate, cardiac, continuous pulse oximetry, frequent blood pressure checks.   There was constant attendance by RN until patient recovered and constant  attendance by physician until patient stable.   Intubation and emergency airway equipment available.     Response: Vital signs stable, oxygen saturations greater than 92%       Patient Status: Post procedure patient was alert.     Total Physician Drug Administration / Monitoring Time: 10 minutes.     Patient was monitored during recovery and returned to pre-procedure baseline.     Electrical Cardioversion         Procedure: Electrical Cardioversion        Indication: Wide-complex ventricular tachycardia     Consent: Written from Patient     Universal Protocol: Universal protocol was followed and time out conducted just prior to starting procedure, confirming patient identity, site/side, procedure, patient position, and availability of correct equipment and implants.      Anesthesia/Sedation: Sedation: The patient was sedated, see separate procedure note for details.      Procedure Detail:   Electrodes were placed: Anterior/posterior  Trial #1: Synchronized Cardioversion at 150 Joules   Cardioversion was successful      Patient Status:  The patient tolerated the procedure well: Yes. There were no complications.    Emergency Department Course & Assessments:    Interventions:  Medications   flumazenil (ROMAZICON) injection 0.2 mg (has no administration in time range)   propofol (DIPRIVAN) injection 10 mg/mL vial ( Intravenous Canceled Entry 6/27/23 2006)   propofol (DIPRIVAN) injection 10 mg/mL vial (20 mg Intravenous $Given 6/27/23 1958)   heparin infusion 25,000 units in D5W 250 mL ANTICOAGULANT (550 Units/hr Intravenous $New Bag 6/27/23 2029)   0.9% sodium chloride BOLUS (0 mLs Intravenous Stopped 6/27/23 2015)   heparin ANTICOAGULANT loading dose for LOW INTENSITY TREATMENT * Give BEFORE starting heparin infusion (2,850 Units Intravenous $Given 6/27/23 2029)   multivitamin, therapeutic (THERA-VIT) tablet 1 tablet (1 tablet Oral $Given 6/27/23 2031)   folic acid (FOLVITE) tablet 1 mg (1 mg Oral $Given 6/27/23 2031)    thiamine (B-1) tablet 100 mg (100 mg Oral $Given 6/27/23 2031)      Independent Interpretation (X-rays, CTs, rhythm strip):  I reviewed the patient's chest x-ray; no significant infiltrate or pneumothorax    Assessments/Consultations/Discussion of Management or Tests:  ED Course as of 06/27/23 2123 Tue Jun 27, 2023 1942 Dr. Glass's Evaluation   1955 Cardioversion initiated   1957 20 Propofol Administered   1957 20 Propofol administered   1958 20 Propofol administered   2018 Recheck   2110 Consultation with Dr. Gunter     Social Determinants of Health affecting care:   None    Disposition:  The patient was admitted to the hospital under the care of Dr. Gunter.     Impression & Plan      CMS Diagnoses: None    Medical Decision Making:  Patient is a 77-year-old male anticoagulated on Eliquis secondary to history of atrial fibrillation who presents with chest discomfort which began today and acutely worsened this evening; referred to the ED from urgent care after EKG demonstrated wide-complex tachycardia.  On my evaluation patient continues to report chest discomfort and is noted to be tachycardic with wide-complex tachycardia concerning for V. tach versus atrial flutter with aberrancy.  Given concern for V. tach, patient was moved to the stabilization room where he underwent procedural sedation and cardioversion with successful return to normal sinus rhythm.  Troponin was noted to be mildly elevated; possibly secondary to demand ischemia.  Repeat EKG without significant evidence of acute ischemia or infarction.  Patient without current chest pain.  Patient was initiated on heparin bolus and infusion.  Labs and chest x-ray without significant acute findings.  Patient be admitted to [facility] with the hospitalist service.  Remained stable throughout the remainder of my care.    Critical Care time:  was 30 minutes for this patient excluding procedures.    Diagnosis:    ICD-10-CM    1. Wide-complex tachycardia  R00.0        2. Chest pain, unspecified type  R07.9            Scribe Disclosure:  I, Kerriealex Dhillon, am serving as a scribe at 8:13 PM on 6/27/2023 to document services personally performed by Chase Glass DO based on my observations and the provider's statements to me.      Scribe Disclosure:  I, KELSEY NIXON, am serving as a scribe at 7:33 PM on 6/27/2023 to document services personally performed by Chase Glass DO based on my observations and the provider's statements to me.     6/27/2023   Chase Glass DO O'Neill, Christopher Warren, DO  06/28/23 0015

## 2023-06-28 NOTE — PROGRESS NOTES
RECEIVING UNIT ED HANDOFF REVIEW    ED Nurse Handoff Report was reviewed by: Margo Cummins RN on June 28, 2023 at 1:11 AM

## 2023-06-28 NOTE — PROGRESS NOTES
Pt. Alert and oriented to self. Restless, impulsive, and frustrated at times. Vital signs stable on RA. SBA. Tolerating regular diet. Lung sounds clear. BM -, adequate urine output. Denies pain and nausea. Tele NSR. Plan for angiogram tomorrow, NPO at 0000.

## 2023-06-29 NOTE — DISCHARGE SUMMARY
"[facility]  Hospitalist Discharge Summary      Date of Admission:  6/27/2023  Date of Discharge:  6/29/2023  Discharging Provider: Fern Horan DO  Discharge Service: Hospitalist Service    Discharge Diagnoses   Wide-complex tachycardia, LBBB pattern, atrial tachycardia/flutter with LBBB aberrancy -vs-VT  Chronic paroxysmal atrial fibrillation  Mild nonobstructive CAD  ANANTH  Possible alcohol use disorder  Chronic dementia  History of multiple prior strokes   Hypertension    Clinically Significant Risk Factors     # Cachexia: Estimated body mass index is 15.65 kg/m  as calculated from the following:    Height as of this encounter: 1.727 m (5' 8\").    Weight as of this encounter: 46.7 kg (102 lb 14.4 oz).       Follow-ups Needed After Discharge   Follow-up Appointments     Follow-up and recommended labs and tests       Follow up with primary care provider, Kash Eller, within 7 days   for hospital follow- up.  The following labs/tests are recommended: BMP,   CBC, lipid profile in one week.    Follow up with cardiology on 7/14/23 as scheduled            Discharge Disposition   Discharged to home  Condition at discharge: Stable    Hospital Course   Gavni House is a markedly pleasant 77 year old gentleman with past medical history that is most notable for chronic atrial fibrillation on Eliquis, among others; who presents with chest pain and is found to have unstable wide complex tachycardia and myonecrosis, concerning for suspected ventricular tachycardia. He was cardioverted successfully in the ED.     Wide-complex tachycardia, LBBB pattern, atrial tachycardia/flutter with LBBB aberrancy -vs-VT  Chronic paroxysmal atrial fibrillation  Mild nonobstructive CAD  Of note, Mr. House has followed by [facility] for chronic paroxysmal atrial fibrillation, on Eliquis (given CHADS2 Vasc score of 5) and Flecainide. Prior notes document concern for daily drinking, and possible severe " alcohol use disorder. TTE in 2016 showed preserved LVEF. Stress test in 2016 was negative for signs of inducible ischemia, and a Zio patch in 2016 showed frequent PVC's. Last, note is made of a neuropsychiatric assessment in 2021 which documented concern for chronic dementia.  Now, he presents with chest pain. In the ED, he initially had wide complex tachycardia to 140, with SBP less than 100. He was successfully cardioverted and is now in NSR. He has mild myonecrosis (Trop 31--131--90). CXR shows a small left pleural effusion. K, Mag, and TSH levels are normal. Overall, his presentation is consistent with ACS and ventricular tachycardia; vs possible symptomatic atrial fibrillation with aberrancy. Either arrhythmia could have been triggered by ETOH use.  * 6/28 Echo: EF 55-60%, normal LV wall motion. RV normal. No significant valve dysfunction. No pericardial effusion. IVC normal  * 6/29 cardiac cath: mid RCA lesion is 15% stenosed. Very mild nonobstructive CAD.  - cardiology appreciated, follow up on 7/14/23 in office  - flecainide STOPPED  - PTA lisinopril 40mg daily resumed  - lopressor 25mg BID added 6/28  - amiodarone 200mg BID for 10 days, then 200mg once daily thereafter (40 day prescription provided)  - resume eliquis 6/29 evening  - follow up with PCP (or cardiology) in 7-14 days as able, get repeat BMP/CBC and lipid profile at that visit  - If lipid profile shows elevated LDL, consider starting low dose atorvastatin given mild CAD seen on cardiac cath     ANANTH, resolved  Suspect due to hypovolemia. IVF given in the ED. Baseline Cr 0.9. Cr 1.24 on admit  - CR improved to 1.1 on 6/28  - Resume PTA chlorthalidone on discharge  - BMP in a week with PCP      Possible Alcohol use disorder  Drinks at least 2 alcoholic beverages daily (large beers and hard liquor on weekends). Educated regarding alcohol use and promotion of cardiac arrhythmias  - recommend complete alcohol cessation     Chronic dementia: As  above.  - PCP follow-up     History of multiple prior strokes  Reportedly seen on prior MRI scans as chronic infarcts. Noted.     Hypertension  - PTA lisinopril and chlorthalidone resumed on discharge  - Given addition of metoprolol, he may require reduction in his BP medication  - advised to check blood pressures if he feels lightheaded/dizzy    Consultations This Hospital Stay   PHARMACY IP CONSULT  CARDIAC REHAB IP CONSULT  CARDIOLOGY IP CONSULT  CARE MANAGEMENT / SOCIAL WORK IP CONSULT  PHARMACY IP CONSULT  PHARMACY IP CONSULT  SMOKING CESSATION PROGRAM IP CONSULT  SMOKING CESSATION PROGRAM IP CONSULT    Code Status   Full Code    Time Spent on this Encounter   IFern DO, personally saw the patient today and spent greater than 30 minutes discharging this patient.       Fern Horan DO  [facility] CORONARY CARE UNIT  [address]  [facility] [address]  Phone: [phone]  ______________________________________________________________________    Physical Exam   Vital Signs: Temp: 97.6  F (36.4  C) Temp src: Oral BP: 107/83 Pulse: 68   Resp: 16 SpO2: 99 % O2 Device: None (Room air)    Weight: 102 lbs 14.4 oz    Patient seen and examined. He had cardiac cath this morning which showed mild nonobstructive CAD. He and his wife are eager for discharge. He has close follow-up with cardiology scheduled in two weeks. He was started on new blood pressure/heart rhythm medications this hospital stay and so far has tolerated. He denies chest pain, shortness of breath. We discussed alcohol cessation as well. Stable for discharge to home with wife.    Constitutional: Awake, alert, cooperative, no apparent distress  Respiratory: Clear to auscultation bilaterally, no crackles or wheezing  Cardiovascular: Regular rate and rhythm, normal S1 and S2, and no murmur noted  GI: Normal bowel sounds, soft, non-distended, non-tender  Skin/Integumen: No rashes, no cyanosis, no edema  Other:  a  little confused to situation       Primary Care Physician   Kash Eller    Discharge Orders      CBC with platelets     Lipid panel     Medication Instructions - Anticoagulants    Do NOT stop your aspirin or platelet inhibitor unless directed by your Cardiologist.  These medications help to prevent platelets in your blood from sticking together and forming a clot.  Examples of these medications are:  Ticagrelor (Brilinta), Clopidigrel (Plavix), Prasugrel (Effient)     When to call - Contact the Heart Clinic    You may experience symptoms that require follow-up before your scheduled appointment. Contact the Heart Clinic if you develop: Fever over 100.4o Fahrenheit, that lasts more than one day; Redness, heat, or pus at the puncture site; Change in color or temperature in your hand or arm.     When to call - Reasons to Call 911    If your wrist puncture site starts bleeding after discharge, sit down and apply firm pressure with your thumb against the puncture site and fingers against the back of the wrist for 10 minutes. If the bleeding stops, continue to rest, keeping your wrist still for 2 hours. Notify your doctor as soon as possible.  IF BLEEDING DOES NOT STOP OR THERE IS A LARGER AMOUNT OF BLEEDING OR SPURTING CALL 9-1-1 immediately.DO NOT drive yourself to the hospital.     Precautions - Lifting    DO NOT lift more than 5 pounds with affected arm for 48 hours     Precautions - Household Activities    Avoid excessive bending or movement of your wrist for 72 hours.  Do not subject hand/arm to any forceful movements for 24 hours, such as supporting weight when rising from a chair or bed.     Remove the band-aid on the puncture site after 24 hours and leave open to air. If minor oozing, you may apply a band-aid and remove after 12 hours.     Precautions - Active Sports Activities    DO NOT engage in vigorous exercise using your affected arm for 3 days after discharge.  This includes golf, tennis or swimming.      Precautions - Operating yard equipment or vehicles    Do not operate a chainsaw, lawnmower, motorcycle, or all-terrain vehicle for 48 hours after the procedure.     Precautions - Elective Dental Work    NO elective dental work for 6 weeks after receiving a stent.     Comfort and Pain Management - Bruising after Surgery    Expect mild tingling of hand and tenderness at the wrist puncture site for up to 3 days. You may take Tylenol or a pain medicine recommended by your doctor.     Activity - Cardiac Rehab    You are encouraged to enroll in an Outpatient Cardiac Rehab program after discharge from the hospital.  Our Cardiac Rehab staff may visit briefly with you while you're in the hospital.  If they miss you, someone will contact you after you are home.     Return to Driving    Driving is NOT permitted for 24 hours after surgery     Return to work    You may return to work after 72 hours if you are feeling well and your job does not involve heavy lifting.     Shower / Bathing    You may shower on the day after your procedure.  DO NOT soak of wrist with the puncture site in water for 3 days to prevent infection. DO NOT take a tub bath or wash dishes for 3 days after the procedure     Dressing Removal    Remove the band-aid on the puncture site after 24 hours and leave open to air. If minor oozing, you may apply a band-aid and remove after 12 hours     Reason for your hospital stay    Chest pain and change in heart rhythm requiring cardioversion     Activity    Your activity upon discharge: activity as tolerated     Discharge Instructions    1. Take metoprolol 25mg twice daily to help control your heart rate    2. Take amiodarone 200mg TWICE daily for 10 days, then decrease to 200mg ONCE daily thereafter. This is also to help control your heart rhythm.     Monitor and record    blood pressure, check your blood pressure if you feel lightheaded or dizzy     Follow-up and recommended labs and tests     Follow up with  primary care provider, Kash Eller, within 7 days for hospital follow- up.  The following labs/tests are recommended: BMP, CBC, lipid profile in one week.    Follow up with cardiology on 23 as scheduled     Diet    Follow this diet upon discharge:  Regular Diet Adult, stop drinking alcohol (beer and hard liquor)       Significant Results and Procedures   Most Recent 3 CBC's:  Recent Labs   Lab Test 23  0858 23  0919 23  0232   WBC 5.0 4.7 5.2   HGB 13.4 13.6 13.0*   MCV 95 94 95    217 222     Most Recent 3 BMP's:  Recent Labs   Lab Test 23  0858 23  0919 23  1944   * 137 135*   POTASSIUM 4.2 3.9 3.8   CHLORIDE 101 103 98   CO2 24 24 27   BUN 10.5 11.4 17.2   CR 1.04 1.11 1.24*   ANIONGAP 10 10 10   SHELBY 8.8 8.8 8.6*   GLC 82 94 144*   ,   Results for orders placed or performed during the hospital encounter of 23   Chest XR,  PA & LAT    Narrative    EXAM: XR CHEST 2 VIEWS  LOCATION: [facility]  DATE: 2023    INDICATION: chest pain  COMPARISON: No prior studies currently available for obstruction.      Impression    IMPRESSION: Heart size within normal limits. Pulmonary vascularity normal. Emphysema. Scarring right lung base. Small left pleural effusion and atelectasis or infiltrate left base. Upper lung fields are clear. Densely calcified mediastinal lymph nodes.   Convex right thoracolumbar scoliosis.   Echocardiogram Complete     Value    LVEF  55-60%    Narrative    [id number]  [id number]  [id number]  [id number]^SANDY^KIMBERYL^CLARISSA     [facility]  Echocardiography Laboratory  [address] [place] [id number]     Name: ZOIE DEL VALLE  MRN: [id number]  : 1945  Study Date: 2023 02:19 PM  Age: 77 yrs  Gender: Male  Patient Location: [facility]  Reason For Study: Chest Pain, Chest Tightness, Chest Pressure  Ordering Physician: KIMBERLY DELGADO  Referring Physician: Kash Eller,  MD  Performed By: Chase Ambriz     BSA: 1.6 m2  Height: 68 in  Weight: 105 lb  HR: 65  BP: 139/74 mmHg  ______________________________________________________________________________  Procedure  Complete Portable Echo Adult.  ______________________________________________________________________________  Interpretation Summary     Left ventricular systolic function is normal.  The visual ejection fraction is 55-60%.  Normal left ventricular wall motion  The right ventricle is normal in structure, function and size.  No significant valve dysfunction.  There is no pericardial effusion.  The inferior vena cava was normal in size with preserved respiratory  variability.     No change from prior.  ______________________________________________________________________________  Left Ventricle  The left ventricle is normal in structure, function and size. There is normal  left ventricular wall thickness. Left ventricular systolic function is normal.  The visual ejection fraction is 55-60%. Left ventricular diastolic function is  normal. Normal left ventricular wall motion.     Right Ventricle  The right ventricle is normal in structure, function and size.     Atria  Normal left atrial size. Right atrial size is normal.     Mitral Valve  There is mild mitral annular calcification. There is trace mitral  regurgitation.     Tricuspid Valve  The tricuspid valve is normal in structure and function. Right ventricular  systolic pressure could not be approximated due to inadequate tricuspid  regurgitation. There is mild (1+) tricuspid regurgitation.     Aortic Valve  The aortic valve is normal in structure and function.     Pulmonic Valve  The pulmonic valve is normal in structure and function.     Vessels  The aortic root is normal size. Normal size ascending aorta. The inferior vena  cava was normal in size with preserved respiratory variability.     Pericardium  There is no pericardial effusion.      ______________________________________________________________________________  MMode/2D Measurements & Calculations  IVSd: 0.83 cm  LVIDd: 3.5 cm  LVIDs: 2.6 cm  LVPWd: 0.83 cm  FS: 25.9 %  LV mass(C)d: 79.5 grams  LV mass(C)dI: 51.1 grams/m2     Ao root diam: 3.4 cm  LA dimension: 2.7 cm  asc Aorta Diam: 3.6 cm  LA/Ao: 0.78  LA Volume (BP): 28.5 ml  LA Volume Index (BP): 18.4 ml/m2  RV Base: 3.2 cm  RWT: 0.47  TAPSE: 1.9 cm     Doppler Measurements & Calculations  MV E max chris: 58.7 cm/sec  MV A max chris: 84.8 cm/sec  MV E/A: 0.69  MV max PG: 3.3 mmHg  MV mean P.1 mmHg  MV V2 VTI: 38.9 cm  MV dec time: 0.27 sec  Ao V2 max: 93.1 cm/sec  Ao max PG: 3.0 mmHg  Ao V2 mean: 62.3 cm/sec  Ao mean P.0 mmHg  Ao V2 VTI: 20.7 cm  LV V1 max P.3 mmHg  LV V1 max: 75.6 cm/sec  LV V1 VTI: 16.3 cm  PA acc time: 0.11 sec  AV Chris Ratio (DI): 0.81  E/E' av.2  Lateral E/e': 7.3  Medial E/e': 7.2  RV S Chris: 11.6 cm/sec     ______________________________________________________________________________  Report approved by: Tavo Guerra 2023 04:42 PM         Cardiac Catheterization    Narrative       Mid RCA lesion is 15% stenosed.    Very mild non-obstructive coronary artery disease           Discharge Medications   Discharge Medication List as of 2023  1:23 PM      CONTINUE these medications which have CHANGED    Details   amiodarone (PACERONE) 200 MG tablet Take 1 tablet (200 mg) by mouth 2 times daily for 10 days, THEN 1 tablet (200 mg) daily for 30 days., Disp-50 tablet, R-0, E-Prescribe      metoprolol tartrate (LOPRESSOR) 25 MG tablet Take 1 tablet (25 mg) by mouth 2 times daily, Disp-60 tablet, R-0, E-Prescribe         CONTINUE these medications which have NOT CHANGED    Details   apixaban ANTICOAGULANT (ELIQUIS) 5 MG tablet Take 1 tablet (5 mg) by mouth 2 times daily, Disp-180 tablet, R-3, E-Prescribe      Ca Carbonate-Mag Hydroxide (ROLAIDS PO) Take by mouth as needed, Historical       chlorthalidone (HYGROTON) 25 MG tablet Take 1 tablet (25 mg) by mouth daily, Disp-30 tablet, R-3, E-Prescribe      EPINEPHrine 0.125 MG/ACT AERO Inhale 1 puff into the lungs daily as needed, Historical      lisinopril (ZESTRIL) 40 MG tablet Take 1 tablet (40 mg) by mouth daily, Disp-90 tablet, R-3, E-Prescribe      tamsulosin (FLOMAX) 0.4 MG capsule Take 1 capsule (0.4 mg) by mouth daily, Disp-90 capsule, R-3, E-Prescribe         STOP taking these medications       flecainide (TAMBOCOR) 100 MG tablet Comments:   Reason for Stopping:             Allergies   Allergies   Allergen Reactions     Penicillins

## 2023-06-29 NOTE — PLAN OF CARE
Goal Outcome Evaluation:    Pt and wife received discharge instructions, questions answered. Left floor by wheelchair.

## 2023-06-29 NOTE — PLAN OF CARE
Pt stable over night, VSS, no c/o Chest pain or sob.  Tele:SR with PVC's.  Heparin gtt infusing without problems, no bleeding noted.  Pt is NPO for an Angiogram today.  Pt is alert to self only and has been out of bed multiple times over night trying get home.

## 2023-06-29 NOTE — PROGRESS NOTES
EP Progress Note          Assessment and Plan:   Gavin House is a delightful 77 year old male with a history of paroxysmal AF on flecainide and Eliquis ([id number] score 5), hx of CVA's, hypertension, history of alcohol abuse and dementia, who presented with chest discomfort and a wide-complex tachycardia with a left bundle branch block, rate around 150 bpm, felt to be unstable due to the presence of chest discomfort and hypotension with an SBP of under 100.  He was successfully cardioverted to normal sinus rhythm.  EP was consulted to assist in his care.  His wife is with him at his bedside.    Echocardiogram completed 6/28/2023: EF 55 to 60%.  No wall motion abnormality noted.  With 1+ tricuspid regurgitation.    1. wide-complex tachycardia, LBBB pattern, atrial tachycardia/flutter with LBBB aberrancy -vs-VT  Flecainide was stopped.  Patient n.p.o. for cardiac catheterization today (due to chest pain and elevated troponin 31 to 131 post cardioversion) Eliquis has been on hold for 2 days and patient is on IV heparin.    Risks and benefits of the procedure were reviewed with the patient and wife yesterday.  Consent was signed.  He continues to be NPO.  No further questions at this time.    Tolerating metoprolol 25 mg twice daily.  Occasional PVC noted on telemetry.    2. paroxysmal atrial fibrillation  PTA flecainide has been discontinued.  Possible WCT was atrial flutter with aberrancy.  [id number]-QPHh score 5.  Eliquis will be restarted after his angiogram.     considering sotalol therapy which would be helpful for treating WCT and AF. However, when I discussed this with the patient and his wife this morning she is reluctant for the patient to remain hospitalized for sotalol loading.    I discussed sending him home on a monitor and she does not believe he would keep it on.  Perhaps amiodarone would be a better option.      3. Dementia  Patient pulled out IV during the night and was pleasantly  "confused.  Patient's wife is hoping if the angiogram is negative that he can go home today.      Ramona Arana, NP, APRN CNP          Interval History:   Patient's affect is bright and denies chest pain, shortness of breath, palpitations, lightheadedness.  VSSA.  Telemetry sinus with occasional PVCs.       Medications:       aspirin  81 mg Oral Daily     folic acid  1 mg Oral Daily     lisinopril  40 mg Oral Daily     metoprolol tartrate  25 mg Oral BID     multivitamin, therapeutic  1 tablet Oral Daily     sodium chloride (PF)  3 mL Intracatheter Q8H     tamsulosin  0.4 mg Oral QPM     thiamine  100 mg Oral Daily             Review of Systems: If done, described below  The Review of Systems is negative other than noted in the HPI             Physical Exam:   Blood pressure 103/83, pulse 65, temperature 97.6  F (36.4  C), temperature source Oral, resp. rate 16, height 1.727 m (5' 8\"), weight 46.7 kg (102 lb 14.4 oz), SpO2 99 %.        Vital Sign Ranges  Temperature Temp  Av.9  F (36.6  C)  Min: 97.5  F (36.4  C)  Max: 98.6  F (37  C)   Blood pressure Systolic (24hrs), Av , Min:103 , Max:166        Diastolic (24hrs), Av, Min:59, Max:84      Pulse Pulse  Av.2  Min: 59  Max: 73   Respirations Resp  Av.3  Min: 16  Max: 24   Pulse oximetry SpO2  Av.4 %  Min: 98 %  Max: 99 %         Intake/Output Summary (Last 24 hours) at 2023 0831  Last data filed at 2023 1718  Gross per 24 hour   Intake 580 ml   Output --   Net 580 ml       Constitutional:   in no apparent distress, pleasantly confused      Lungs:   symmetric, clear to auscultation     Cardiovascular:   regular rate and rhythm, normal S1 and S2 and no murmur noted     Extremities and Back:   No edema              Data:     Lab Results   Component Value Date    WBC 4.7 2023    HGB 13.6 2023    HCT 39.9 (L) 2023     2023     2023    POTASSIUM 3.9 2023    CHLORIDE 103 2023    [facility] " 24 06/28/2023    BUN 11.4 06/28/2023    CR 1.11 06/28/2023    GLC 94 06/28/2023    NTBNPI 209 10/27/2019    TROPI 0.068 (H) 10/28/2019    AST 17 04/05/2023    ALT 6 (L) 04/05/2023    ALKPHOS 100 04/05/2023    BILITOTAL 0.7 04/05/2023    INR 1.08 10/27/2019

## 2023-06-30 NOTE — PROGRESS NOTES
Physical Therapy Discharge Summary    Reason for therapy discharge:    Discharged with wife.    Progress towards therapy goal(s). See goals on Care Plan in [facility] electronic health record for goal details.  NA - DC before seen.    Therapy recommendation(s):    NA

## 2023-07-01 NOTE — PROGRESS NOTES
[facility] Resource Center Contact  [facility]/Voicemail     Clinical Data: Transitional Care Management Outreach     Outreach attempted x 2.  Left message on patient's voicemail, providing [facility]'s 24/7 scheduling and nurse triage phone number [phone] ([phone]) for questions/concerns and/or to schedule an appt with an [facility] provider, if they do not have a PCP.      Plan:  [facility] will do no further outreaches at this time.     JUANPABLO Qureshi  [phone]  [facility]    *Connected Care Resource Team does NOT follow patient ongoing. Referrals are identified based on internal discharge reports and the outreach is to ensure patient has an understanding of their discharge instructions.

## 2023-07-10 NOTE — TELEPHONE ENCOUNTER
Patient was admitted to [facility] on 6/27/23 with chest discomfort and a wide-complex tachycardia with a left bundle branch block, rate around 150 bpm, felt to be unstable due to the presence of chest discomfort and hypotension with an SBP of under 100. He was successfully cardioverted to normal sinus rhythm. Flecainide was stopped.    PMH: paroxysmal AF on Flecainide and Eliquis ([id number]-XQNc score 5), hx of CVA's, hypertension, history of alcohol abuse and dementia.    6/28/23: Echo showed EF of 55-60%. Normal left ventricular wall motion. The right ventricle is normal in structure, function and size. No significant valve dysfunction.    6/29/23: Coronary angiogram via LRA showed very mild non-obstructive coronary artery disease.    EP started pt on Amiodarone 200 mg BID for 10 days, then 200 mg once daily thereafter (40 day prescription provided).     Pt was started on Amiodarone taper as above and Metoprolol. PTA Eliquis continued and Flecainide was discontinued at time of discharge.    Pt is scheduled for labs on 7/13/23 at 0745, and an OV on 7/14/23 at 0850 with BIBI Yadira Bullock both at our [place] Office.    Pt was discharged to home with wife.    Writer attempted to call pt for a cardiology post discharge phone call, but no answer. VM left to return my call. KIANA Gray RN.

## 2023-07-14 NOTE — PATIENT INSTRUCTIONS
Call the nurse for any questions or concerns at [phone]     Plan:  1. Medication changes:     DECREASE metoprolol tartrate to 12.5 mg twice daily (0.5 tablets twice daily)    2. Pulmonary function tests in the near future    3. Follow up with Dr. Fernandes in 4 months, sooner if needed    -Scheduling phone number: [phone]    It was great seeing you today!    Yadira Bullock PA-C  Physician Assistant  [facility] [place] [facility]

## 2023-07-14 NOTE — LETTER
7/14/2023    Kash Eller MD  [address] [place] [id number]    RE: Gavin House       Dear Colleague,     I had the pleasure of seeing Gavin House in the [facility].  Cardiology Clinic Progress Note  Gavin House MRN# [id number]   YOB: 1945 Age: 77 year old   Primary Cardiologist: Was Dr. Verma; will need to cardiologist moving forward Reason for visit: hospital follow up            Assessment and Plan:   Gavin House is a very pleasant 77 year old male who is here today for hospital follow up.      Paroxysmal atrial fibrillation  -Had been on flecainide 100 mg twice daily for several years.    -Presented 6/27/2023 with wide-complex tachycardia (AT/AFL with LBBB aberrancy vs VT)   -Cardioverted in the ED with successful restoration of NSR.  Flecainide discontinued.  Plan was for sotalol initiation, patient did not want to remain hospitalized so amiodarone was pursued instead  -On amiodarone 200 mg once daily  -On Eliquis 5 mg twice daily for cardioembolic risk reduction (started 4/2023)              -[id number] score is 5 (agex2, HTN, hx CVA)  Wide-complex tachycardia  -Atrial tachycardia/atrial flutter with LBBB aberrancy versus VT 6/27/2023 in the setting of flecainide use  -Cardioverted in the ED with successful restoration of NSR.  Flecainide has been discontinued  Hypertension  -BP well controlled on current regimen  -BP in clinic today 122/59  History of CVA  -Brain MRI completed 10/2021 without acute abnormalities but evidence of multiple small old strokes  -Seen by neurology 12/2021; consideration of chronic anticoagulation initiation discussed but deferred to cardiology/PCP. Was never initiated  Alcohol abuse  -Consuming 4-5 alcoholic beverages daily  -Cutting back/cessation encouraged    Changes today: Decrease metoprolol to tartrate to 12.5 mg twice daily    ECG completed in clinic today reveals sinus bradycardia with VR 53, , QRS 90,  QT/QTc 460 ms.     Mr. Lindsey has been doing very well since discharge from the hospital.  His blood pressure remains optimally controlled and he has not had recurrent atrial fibrillation on his current medication regimen.  He will continue amiodarone 200 mg once daily, but I would like to decrease his metoprolol to tartrate to 12.5 mg twice daily given his bradycardia.  I will not make any other medication changes today.  He remains on Eliquis 5 mg twice daily for cardioembolic risk reduction in the setting of his elevated [id number] score of 5.  Given amiodarone initiation, recommend completion of baseline PFTs.  These will be done in the near future.     Alcohol cessation recommended as this has been a proven trigger for the development of recurrent atrial fibrillation in this patient.    Patient is directly followed with Dr. Holman who is now retired.  He prefers to follow with Dr. Fernandes, who he met in the hospital last month, moving forward.  I will arrange an appointment ~4 months from now.  If he has issues in the meantime, we would be happy to see him sooner.    Yadira Bullock PA-C  [facility] [place]  Pager: [phone]          History of Presenting Illness:    Gavin House is a very pleasant 77 year old male with a history of paroxysmal atrial fibrillation, hypertension, prior CVA, and ongoing alcohol abuse.     He has historically done very well on flecainide 100 mg twice daily for atrial fibrillation suppression.  However, he has had episodes of recurrent atrial fibrillation occurring in the setting of excess alcohol intake.  His last echocardiogram was completed in 2016 revealing EF 55 to 60% with grade 1 diastolic dysfunction.  There was no significant valvular disease.      Unfortunately, patient had a stroke in late 2021 and has since been struggling with memory difficulties.  At the time of his clinic appointment 4/2023, he was started on Eliquis 5 mg twice daily.  He has had  several follow-ups for hypertension management, currently on lisinopril, chlorthalidone, and metoprolol.    Unfortunately, patient presented to [facility] with chest discomfort, found to be in a wide-complex tachycardia with LBBB morphology.  He was hypotensive with SBP under 100.  Underwent successful cardioversion with restoration of normal sinus rhythm.  After evaluation by electrophysiology, it was unclear if his wide-complex tachycardia was secondary to atrial tachycardia/flutter with LBB aberrancy versus ventricular tachycardia.  Regardless, flecainide was discontinued.  Echocardiogram 6/28/2023 revealed LVEF 55 to 60%, no wall motion abnormalities, normal RV size and function, and no hemodynamically significant valvular disease.  Patient underwent coronary angiogram 6/29/2023 that revealed a 15% stenosed mid RCA lesion and otherwise minimal nonobstructive coronary artery disease. Discussion was had regarding potential sotalol initiation, deferred as patient did not want to remain hospitalized during the loading process.  Therefore, amiodarone was started instead.  Baseline labs were completed revealing normal TSH and normal LFTs.  Chest x-ray completed while inpatient.    Patient is here today for hospital follow-up.  Labs completed yesterday.  CBC stable when compared to prior.  Lipid panel revealed , triglycerides 82, HDL 86, LDL 88.  ALT WNL.    Mr. Jacobo has been doing very well since discharge from the hospital.  Denies chest pain, palpitations, lightheadedness, dizziness, near-syncope or syncope.  No shortness of breath, orthopnea or PND.  No lower extremity edema.  He is taking all of his medications daily as prescribed.  Blood pressure 122/56 and heart rate 50s in clinic today.    He has decreased his alcohol consumption and is now only consuming on the weekends.  2-3 beverages on Saturday and Sunday.  No tobacco use.  No routine exercise.      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 or more    Drug use: No    Sexual activity: Yes     Partners: Female   Other Topics Concern    Parent/sibling w/ CABG, MI or angioplasty before 65F 55M? No   Social History Narrative    , has 2 children, is retired from his manufacturing job as of age 68.  He goes to the bar daily to socialize and drink -- does not want to stop drinking.  (last updated 10/27/2019)      Social Determinants of Health     Financial Resource Strain: Not on file   Food Insecurity: Not on file   Transportation Needs: Not on file   Physical Activity: Not on file   Stress: Not on file   Social Connections: Not on file   Intimate Partner Violence: Not on file   Housing Stability: Not on file            Review of Systems:   Please see HPI         Physical Exam:   Vitals: /59   Pulse 68   Resp 17   Ht 1.702 m (5' 7\")   Wt 47.2 kg (104 lb)   SpO2 97%   BMI 16.29 kg/m     Wt Readings from Last 4 Encounters:   23 46.7 kg (102 lb 14.4 oz)   23 47.6 kg (105 lb)   23 47.6 kg (105 lb)   23 48.5 kg (107 lb)     GEN: well nourished, in no acute distress.  HEENT:  Pupils equal, round. Sclerae nonicteric.   NECK: Supple, no masses appreciated.  No JVD  C/V:  Regular rhythm; bradycardic.  No murmur appreciated  RESP: Respirations are unlabored. Clear to auscultation bilaterally without " wheezing, rales, or rhonchi.  GI: Abdomen soft, nontender.  EXTREM: No LE edema.  NEURO: Alert and oriented, cooperative.  SKIN: Warm and dry.        Data:   LIPID RESULTS:  Lab Results   Component Value Date    CHOL 150 07/13/2023    CHOL 171 10/02/2018    HDL 46 07/13/2023    HDL 62 10/02/2018    LDL 88 07/13/2023    LDL 88 10/02/2018    TRIG 82 07/13/2023    TRIG 104 10/02/2018    CHOLHDLRATIO 2.5 12/03/2014     LIVER ENZYME RESULTS:  Lab Results   Component Value Date    AST 17 04/05/2023    AST 25 10/27/2019    ALT 6 07/13/2023    ALT 16 10/27/2019     CBC RESULTS:  Lab Results   Component Value Date    WBC 5.4 07/13/2023    WBC 4.6 05/17/2021    RBC 4.30 (L) 07/13/2023    RBC 4.14 (L) 05/17/2021    HGB 14.0 07/13/2023    HGB 13.5 05/17/2021    HCT 41.7 07/13/2023    HCT 41.5 05/17/2021    MCV 97 07/13/2023     05/17/2021    MCH 32.6 07/13/2023    MCH 32.6 05/17/2021    MCHC 33.6 07/13/2023    MCHC 32.5 05/17/2021    RDW 11.6 07/13/2023    RDW 12.5 05/17/2021     07/13/2023     05/17/2021     BMP RESULTS:  Lab Results   Component Value Date     (L) 06/29/2023     05/17/2021    POTASSIUM 4.2 06/29/2023    POTASSIUM 3.7 10/29/2021    POTASSIUM 4.0 05/17/2021    CHLORIDE 101 06/29/2023    CHLORIDE 108 10/29/2021    CHLORIDE 104 05/17/2021    CO2 24 06/29/2023    CO2 28 10/29/2021    CO2 32 05/17/2021    ANIONGAP 10 06/29/2023    ANIONGAP 5 10/29/2021    ANIONGAP 2 (L) 05/17/2021    GLC 82 06/29/2023    GLC 91 10/29/2021    GLC 97 05/17/2021    BUN 10.5 06/29/2023    BUN 13 10/29/2021    BUN 10 05/17/2021    CR 1.04 06/29/2023    CR 0.84 05/17/2021    GFRESTIMATED 74 06/29/2023    GFRESTIMATED 86 05/17/2021    GFRESTBLACK >90 05/17/2021    SHELBY 8.8 06/29/2023    SHELBY 8.3 (L) 05/17/2021      A1C RESULTS:  Lab Results   Component Value Date    A1C 5.0 12/14/2021     INR RESULTS:  Lab Results   Component Value Date    INR 1.08 10/27/2019    INR 0.99 07/21/2008            Medications  "    Current Outpatient Medications   Medication Sig Dispense Refill    amiodarone (PACERONE) 200 MG tablet Take 1 tablet (200 mg) by mouth 2 times daily for 10 days, THEN 1 tablet (200 mg) daily for 30 days. 50 tablet 0    apixaban ANTICOAGULANT (ELIQUIS) 5 MG tablet Take 1 tablet (5 mg) by mouth 2 times daily 180 tablet 3    Ca Carbonate-Mag Hydroxide (ROLAIDS PO) Take by mouth as needed      chlorthalidone (HYGROTON) 25 MG tablet Take 1 tablet (25 mg) by mouth daily 30 tablet 3    EPINEPHrine 0.125 MG/ACT AERO Inhale 1 puff into the lungs daily as needed      lisinopril (ZESTRIL) 40 MG tablet Take 1 tablet (40 mg) by mouth daily 90 tablet 3    metoprolol tartrate (LOPRESSOR) 25 MG tablet Take 1 tablet (25 mg) by mouth 2 times daily 60 tablet 0    tamsulosin (FLOMAX) 0.4 MG capsule Take 1 capsule (0.4 mg) by mouth daily 90 capsule 3          Past Medical History     Past Medical History:   Diagnosis Date    Alcohol use     Allergic rhinitis, cause unspecified     Diverticulosis of colon     Diverticulitis 2010    HTN (hypertension)     Memory loss     Mild intermittent asthma     Paroxysmal atrial fibrillation (H) 2016    Pneumothorax     PVC (premature ventricular contraction)     intermittent bigeminy    Rosacea     Scoliosis (and kyphoscoliosis), idiopathic     Stroke (H)      Past Surgical History:   Procedure Laterality Date    CV CORONARY ANGIOGRAM N/A 2023    Procedure: Coronary Angiogram;  Surgeon: Jennifer Daniel MD;  Location: [facility] CARDIAC CATH LAB    [facility] NONSPECIFIC PROCEDURE  1974    right inguinal herniorraphy    [facility] NONSPECIFIC PROCEDURE  age 16    L inguinal herniorraphy     Family History   Problem Relation Age of Onset    Colon Cancer Father          of this age 69    Alcoholism Father     Heart Disease Mother         \"enlarged heart\"; d: age 75    C.A.D. Brother          in his 80's     Alcoholism Brother          in his 60's             Allergies "   Penicillins      30 minutes spent on the date of the encounter doing chart review, history and exam, documentation and further activities as noted above    NATALY Bryant [facility]  Pager: [phone]      Thank you for allowing me to participate in the care of your patient.      Sincerely,     NATALY Bryant [facility]  cc:   Yadira Bullock PA-C  [address]  [place] [address]

## 2023-07-14 NOTE — PROGRESS NOTES
Cardiology Clinic Progress Note  Gavin House MRN# [id number]   YOB: 1945 Age: 77 year old   Primary Cardiologist: Was Dr. Verma; will need to cardiologist moving forward Reason for visit: hospital follow up            Assessment and Plan:   Gavin House is a very pleasant 77 year old male who is here today for hospital follow up.      Paroxysmal atrial fibrillation  -Had been on flecainide 100 mg twice daily for several years.    -Presented 6/27/2023 with wide-complex tachycardia (AT/AFL with LBBB aberrancy vs VT)   -Cardioverted in the ED with successful restoration of NSR.  Flecainide discontinued.  Plan was for sotalol initiation, patient did not want to remain hospitalized so amiodarone was pursued instead  -On amiodarone 200 mg once daily  -On Eliquis 5 mg twice daily for cardioembolic risk reduction (started 4/2023)              -[id number] score is 5 (agex2, HTN, hx CVA)  Wide-complex tachycardia  -Atrial tachycardia/atrial flutter with LBBB aberrancy versus VT 6/27/2023 in the setting of flecainide use  -Cardioverted in the ED with successful restoration of NSR.  Flecainide has been discontinued  Hypertension  -BP well controlled on current regimen  -BP in clinic today 122/59  History of CVA  -Brain MRI completed 10/2021 without acute abnormalities but evidence of multiple small old strokes  -Seen by neurology 12/2021; consideration of chronic anticoagulation initiation discussed but deferred to cardiology/PCP. Was never initiated  Alcohol abuse  -Consuming 4-5 alcoholic beverages daily  -Cutting back/cessation encouraged    Changes today: Decrease metoprolol to tartrate to 12.5 mg twice daily    ECG completed in clinic today reveals sinus bradycardia with VR 53, , QRS 90, QT/QTc 460 ms.     Mr. Lindsey has been doing very well since discharge from the hospital.  His blood pressure remains optimally controlled and he has not had recurrent atrial fibrillation on his current  medication regimen.  He will continue amiodarone 200 mg once daily, but I would like to decrease his metoprolol to tartrate to 12.5 mg twice daily given his bradycardia.  I will not make any other medication changes today.  He remains on Eliquis 5 mg twice daily for cardioembolic risk reduction in the setting of his elevated [id number] score of 5.  Given amiodarone initiation, recommend completion of baseline PFTs.  These will be done in the near future.     Alcohol cessation recommended as this has been a proven trigger for the development of recurrent atrial fibrillation in this patient.    Patient is directly followed with Dr. Holman who is now retired.  He prefers to follow with Dr. Fernandes, who he met in the hospital last month, moving forward.  I will arrange an appointment ~4 months from now.  If he has issues in the meantime, we would be happy to see him sooner.    Yadira Bullock PA-C  [facility]  Pager: [phone]          History of Presenting Illness:    Gavin House is a very pleasant 77 year old male with a history of paroxysmal atrial fibrillation, hypertension, prior CVA, and ongoing alcohol abuse.     He has historically done very well on flecainide 100 mg twice daily for atrial fibrillation suppression.  However, he has had episodes of recurrent atrial fibrillation occurring in the setting of excess alcohol intake.  His last echocardiogram was completed in 2016 revealing EF 55 to 60% with grade 1 diastolic dysfunction.  There was no significant valvular disease.      Unfortunately, patient had a stroke in late 2021 and has since been struggling with memory difficulties.  At the time of his clinic appointment 4/2023, he was started on Eliquis 5 mg twice daily.  He has had several follow-ups for hypertension management, currently on lisinopril, chlorthalidone, and metoprolol.    Unfortunately, patient presented to [facility] with chest discomfort, found to be in a  wide-complex tachycardia with LBBB morphology.  He was hypotensive with SBP under 100.  Underwent successful cardioversion with restoration of normal sinus rhythm.  After evaluation by electrophysiology, it was unclear if his wide-complex tachycardia was secondary to atrial tachycardia/flutter with LBB aberrancy versus ventricular tachycardia.  Regardless, flecainide was discontinued.  Echocardiogram 6/28/2023 revealed LVEF 55 to 60%, no wall motion abnormalities, normal RV size and function, and no hemodynamically significant valvular disease.  Patient underwent coronary angiogram 6/29/2023 that revealed a 15% stenosed mid RCA lesion and otherwise minimal nonobstructive coronary artery disease. Discussion was had regarding potential sotalol initiation, deferred as patient did not want to remain hospitalized during the loading process.  Therefore, amiodarone was started instead.  Baseline labs were completed revealing normal TSH and normal LFTs.  Chest x-ray completed while inpatient.    Patient is here today for hospital follow-up.  Labs completed yesterday.  CBC stable when compared to prior.  Lipid panel revealed , triglycerides 82, HDL 86, LDL 88.  ALT WNL.    Mr. Jacobo has been doing very well since discharge from the hospital.  Denies chest pain, palpitations, lightheadedness, dizziness, near-syncope or syncope.  No shortness of breath, orthopnea or PND.  No lower extremity edema.  He is taking all of his medications daily as prescribed.  Blood pressure 122/56 and heart rate 50s in clinic today.    He has decreased his alcohol consumption and is now only consuming on the weekends.  2-3 beverages on Saturday and Sunday.  No tobacco use.  No routine exercise.      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 or more     Drug use: No     Sexual activity: Yes     Partners: Female   Other Topics Concern     Parent/sibling w/ CABG, MI or angioplasty before 65F 55M? No   Social History Narrative    , has 2 children, is retired from his manufacturing job as of age 68.  He goes to the bar daily to socialize and drink -- does not want to stop drinking.  (last updated 10/27/2019)      Social Determinants of Health     Financial Resource Strain: Not on file   Food Insecurity: Not on file   Transportation Needs: Not on file   Physical Activity: Not on file   Stress: Not on file   Social Connections: Not on file   Intimate Partner Violence: Not on file   Housing Stability: Not on file            Review of Systems:   Please see HPI         Physical Exam:   Vitals: /59   Pulse 68   Resp 17   Ht 1.702 m (5' 7\")   Wt 47.2 kg (104 lb)   SpO2 97%   BMI 16.29 kg/m     Wt Readings from Last 4 Encounters:   23 46.7 kg (102 lb 14.4 oz)   23 47.6 kg (105 lb)   23 47.6 kg (105 lb)   23 48.5 kg (107 lb)     GEN: well nourished, in no acute distress.  HEENT:  Pupils equal, round. Sclerae nonicteric.   NECK: Supple, no masses appreciated.  No JVD  C/V:  Regular rhythm; bradycardic.  No murmur appreciated  RESP: Respirations are unlabored. Clear to auscultation bilaterally without wheezing, rales, or rhonchi.  GI: Abdomen soft, nontender.  EXTREM: No LE edema.  NEURO: Alert and oriented, cooperative.  SKIN: Warm and dry.        Data:   LIPID RESULTS:  Lab Results   Component Value " Date    CHOL 150 07/13/2023    CHOL 171 10/02/2018    HDL 46 07/13/2023    HDL 62 10/02/2018    LDL 88 07/13/2023    LDL 88 10/02/2018    TRIG 82 07/13/2023    TRIG 104 10/02/2018    CHOLHDLRATIO 2.5 12/03/2014     LIVER ENZYME RESULTS:  Lab Results   Component Value Date    AST 17 04/05/2023    AST 25 10/27/2019    ALT 6 07/13/2023    ALT 16 10/27/2019     CBC RESULTS:  Lab Results   Component Value Date    WBC 5.4 07/13/2023    WBC 4.6 05/17/2021    RBC 4.30 (L) 07/13/2023    RBC 4.14 (L) 05/17/2021    HGB 14.0 07/13/2023    HGB 13.5 05/17/2021    HCT 41.7 07/13/2023    HCT 41.5 05/17/2021    MCV 97 07/13/2023     05/17/2021    MCH 32.6 07/13/2023    MCH 32.6 05/17/2021    MCHC 33.6 07/13/2023    MCHC 32.5 05/17/2021    RDW 11.6 07/13/2023    RDW 12.5 05/17/2021     07/13/2023     05/17/2021     BMP RESULTS:  Lab Results   Component Value Date     (L) 06/29/2023     05/17/2021    POTASSIUM 4.2 06/29/2023    POTASSIUM 3.7 10/29/2021    POTASSIUM 4.0 05/17/2021    CHLORIDE 101 06/29/2023    CHLORIDE 108 10/29/2021    CHLORIDE 104 05/17/2021    CO2 24 06/29/2023    CO2 28 10/29/2021    CO2 32 05/17/2021    ANIONGAP 10 06/29/2023    ANIONGAP 5 10/29/2021    ANIONGAP 2 (L) 05/17/2021    GLC 82 06/29/2023    GLC 91 10/29/2021    GLC 97 05/17/2021    BUN 10.5 06/29/2023    BUN 13 10/29/2021    BUN 10 05/17/2021    CR 1.04 06/29/2023    CR 0.84 05/17/2021    GFRESTIMATED 74 06/29/2023    GFRESTIMATED 86 05/17/2021    GFRESTBLACK >90 05/17/2021    SHELBY 8.8 06/29/2023    SHELBY 8.3 (L) 05/17/2021      A1C RESULTS:  Lab Results   Component Value Date    A1C 5.0 12/14/2021     INR RESULTS:  Lab Results   Component Value Date    INR 1.08 10/27/2019    INR 0.99 07/21/2008            Medications     Current Outpatient Medications   Medication Sig Dispense Refill     amiodarone (PACERONE) 200 MG tablet Take 1 tablet (200 mg) by mouth 2 times daily for 10 days, THEN 1 tablet (200 mg) daily for 30 days.  "50 tablet 0     apixaban ANTICOAGULANT (ELIQUIS) 5 MG tablet Take 1 tablet (5 mg) by mouth 2 times daily 180 tablet 3     Ca Carbonate-Mag Hydroxide (ROLAIDS PO) Take by mouth as needed       chlorthalidone (HYGROTON) 25 MG tablet Take 1 tablet (25 mg) by mouth daily 30 tablet 3     EPINEPHrine 0.125 MG/ACT AERO Inhale 1 puff into the lungs daily as needed       lisinopril (ZESTRIL) 40 MG tablet Take 1 tablet (40 mg) by mouth daily 90 tablet 3     metoprolol tartrate (LOPRESSOR) 25 MG tablet Take 1 tablet (25 mg) by mouth 2 times daily 60 tablet 0     tamsulosin (FLOMAX) 0.4 MG capsule Take 1 capsule (0.4 mg) by mouth daily 90 capsule 3          Past Medical History     Past Medical History:   Diagnosis Date     Alcohol use      Allergic rhinitis, cause unspecified      Diverticulosis of colon     Diverticulitis 2010     HTN (hypertension)      Memory loss      Mild intermittent asthma      Paroxysmal atrial fibrillation (H) 2016     Pneumothorax      PVC (premature ventricular contraction)     intermittent bigeminy     Rosacea      Scoliosis (and kyphoscoliosis), idiopathic      Stroke (H)      Past Surgical History:   Procedure Laterality Date     CV CORONARY ANGIOGRAM N/A 2023    Procedure: Coronary Angiogram;  Surgeon: Jennifer Daniel MD;  Location:  HEART CARDIAC CATH LAB     [facility] NONSPECIFIC PROCEDURE  1974    right inguinal herniorraphy     [facility] NONSPECIFIC PROCEDURE  age 16    L inguinal herniorraphy     Family History   Problem Relation Age of Onset     Colon Cancer Father          of this age 69     Alcoholism Father      Heart Disease Mother         \"enlarged heart\"; d: age 75     C.A.D. Brother          in his 80's      Alcoholism Brother          in his 60's             Allergies   Penicillins      30 minutes spent on the date of the encounter doing chart review, history and exam, documentation and further activities as noted above    Yadira Bullock PA-C  [facility]  Pager: [phone]

## 2023-07-19 NOTE — TELEPHONE ENCOUNTER
Writer notes pt did arrive for scheduled cardiology appt. Will close this encounter. KIANA Gray RN.

## 2023-08-09 NOTE — PROGRESS NOTES
Assessment & Plan     Medicare annual wellness visit, subsequent      Essential hypertension  Continue medication as currently  - Basic metabolic panel  (Ca, Cl, CO2, Creat, Gluc, K, Na, BUN); Future  - Basic metabolic panel  (Ca, Cl, CO2, Creat, Gluc, K, Na, BUN)    Paroxysmal A-fib (H)  Continue full anticoagulation, will have to keep fall risk in mind as we move forward.    Coronary artery disease involving native coronary artery of native heart without angina pectoris  Recently diagnosed during recent hospitalization, though not clinically significant.  Start statin and recheck lipids in another 3 months.  - atorvastatin (LIPITOR) 20 MG tablet; Take 1 tablet (20 mg) by mouth daily  - Lipid panel reflex to direct LDL Fasting; Future  - Comprehensive metabolic panel; Future    Alcohol use disorder    - Vitamin B12; Future  - Vitamin B1 plasma; Future  - Folate; Future  - Vitamin B12  - Vitamin B1 plasma  - Folate    Mild dementia with mood disturbance, unspecified dementia type (H)  Discussed this diagnosis in some detail, recommended that he follow-up with neurology again                 Kash Eller MD  [facility]          Marsha Alonso is a 77 year old, presenting for the following health issues:  Hypertension      HPI     Hypertension Follow-up    Do you check your blood pressure regularly outside of the clinic? No   Are you following a low salt diet? No  Are your blood pressures ever more than 140 on the top number (systolic) OR more   than 90 on the bottom number (diastolic), for example 140/90? No    Annual Wellness Visit    Patient has been advised of split billing requirements and indicates understanding: Yes     Are you in the first 12 months of your Medicare Part B coverage?  No    Physical Health:  In general, how would you rate your overall physical health? fair  Outside of work, how many days during the week do you exercise?1 day/week  Outside of work,  "approximately how many minutes a day do you exercise?less than 15 minutes  If you drink alcohol do you typically have >3 drinks per day or >7 drinks per week? Yes - AUDIT SCORE:       Do you usually eat at least 4 servings of fruit and vegetables a day, include whole grains & fiber and avoid regularly eating high fat or \"junk\" foods? Yes  Do you have any problems taking medications regularly? No  Do you have any side effects from medications? not applicable  Needs assistance for the following daily activities: telephone use, transportation, shopping, preparing meals, housework, money management, and taking medicine  Which of the following safety concerns are present in your home?  none identified   Hearing impairment: No  In the past 6 months, have you been bothered by leaking of urine? no    Mental Health:  In general, how would you rate your overall mental or emotional health? fair  PHQ-2 Score: 0    Do you feel safe in your environment? Yes    Have you ever done Advance Care Planning? (For example, a Health Directive, POLST, or a discussion with a medical provider or your loved ones about your wishes)?     Fall risk:  Fallen 2 or more times in the past year?: No  Any fall with injury in the past year?: No    Cognitive Screening: Not appropriate due to known dementia        Social History     Tobacco Use    Smoking status: Former     Packs/day: 1.00     Years: 23.00     Pack years: 23.00     Types: Cigarettes     Start date:      Quit date:      Years since quittin.6    Smokeless tobacco: Never   Substance Use Topics    Alcohol use: Yes     Alcohol/week: 0.0 standard drinks of alcohol     Comment: 2 vodka drinks a day ... or more              No data to display              Do you have a current opioid prescription? No  Do you use any other controlled substances or medications that are not prescribed by a provider? Alcohol    Current providers sharing in care for this patient include:   Patient Care " Team:  Kash Eller MD as PCP - General  Kash Eller MD as Assigned PCP  Cameron Kowalski MD as Assigned Surgical Provider  Yadira Bullock PA-C as Physician Assistant (Cardiology)    Patient has been advised of split billing requirements and indicates understanding: Yes      Has not followed up with neurology since 2021, when given diagnosis of mild dementia.  Wife continues to endorse memory problems at home, occasional behavioral disturbances.  He continues to drink alcohol somewhat regularly.    Follows with cardiology for his paroxysmal atrial fibrillation, continues full anticoagulation.    Review of Systems   Constitutional, HEENT, cardiovascular, pulmonary, gi and gu systems are negative, except as otherwise noted.      Objective    /70   Pulse 53   Temp 98  F (36.7  C)   Wt 47 kg (103 lb 11.2 oz)   SpO2 99%   BMI 16.24 kg/m    Body mass index is 16.24 kg/m .  Physical Exam   GENERAL: healthy, alert and no distress  NECK: no adenopathy, no asymmetry, masses, or scars and thyroid normal to palpation  RESP: lungs clear to auscultation - no rales, rhonchi or wheezes  CV: regular rate and rhythm, normal S1 S2, no S3 or S4, no murmur, click or rub, no peripheral edema and peripheral pulses strong  ABDOMEN: soft, nontender, no hepatosplenomegaly, no masses and bowel sounds normal  MS: no gross musculoskeletal defects noted, no edema

## 2023-08-09 NOTE — PATIENT INSTRUCTIONS
- Start taking atorvastatin, once daily.  This is a medication for your cholesterol  (Prescription has been sent to your pharmacy)  - Continue all other medications as you have been.      - I will contact you with lab results from today.     - Please come in for fasting labs in 3 months.  I will be in touch with you when I receive the results.

## 2023-11-14 NOTE — LETTER
11/14/2023    Kash Eller MD  [address] [place] [id number]    RE: Gavin House       Dear Colleague,     I had the pleasure of seeing Gavin House in the [facility].  PHYSICIAN NOTE:  This visit was completed in person at the [facility].      I had the pleasure of seeing Mr. Gavin House for evaluation of atrial fibrillation.  Previously followed by Dr. Mendez Verma.  I initially met the patient during his June 2023 hospitalization with [facility].    The patient is a pleasant 77-year-old male with the following medical issues:  Paroxysmal atrial fibrillation. Treated with flecainide for rhythm control for years. Presented with wide-complex tachycardia, LBBB pattern, in June 2023. Flecainide was stopped. Recommended sotalol but the patient did not want to stay at the hospital for loading. Amiodarone was started instead.  [id number] at least 5.  On Eliquis.  Hypertension.  CVA diagnosed by brain MRI in 10/2021, showing multiple small strokes.  Alcohol abuse. He has significantly cut back.    ---------------------------------------------------------------------------  He is feeling well. No symptomatic AF events. No chest pain, unusual dyspnea, orthopnea/PND or other cardiac symptoms.    No bleeding issues while on anticoagulation.  ---------------------------------------------------------------------------    PHYSICAL EXAMINATION:  Vital signs: 10 655, 55, 48 kg, BMI 17.6  General: slender elderly gentlemen, in no apparent distress accompanied by his wife  ENT/Mouth:  no nasal discharge.  Eyes:  normal conjunctivae.   Neck:  no thyromegaly or lymphadenopathy.  Chest/Lungs:  patient is not dyspneic. Few crackles at the left base.  Cardiovascular: normal JVP, rhythm is regular.  No gallop, murmur or rub.    Abdomen:  no abdominal distention.  Soft, negative HJR.    Extremities:  no edema  Skin:  no xanthelasma.    Neurologic:  alert & oriented x 3.    Vascular:  2+  carotids without bruits.      DIAGNOSTIC STUDIES:  Laboratory studies: sodium 143, potassium 4.2, creatinine 1.53, hematocrit 41%  ECG: sinus rhythm, possible old anterior MI pattern  Echocardiogram (06/2023): EF 55 - 60%, normal RV, no significant valvular abnormalities.      IMPRESSION:  Paroxysmal atrial fibrillation. Doing well on amiodarone. Will decrease to six days per week. He will need thyroid and liver function tests twice per year and chest x-ray yearly. I congratulated him on cutting back alcohol use.  Hypertension. Controlled.    RECOMMENDATIONS:  Decrease amiodarone to 200 mg 6 d/week (skip on Sundays).  Thyroid and liver function tests.  See Yadira in six months.    It was my pleasure seeing this delightful patient.  Please feel free to call with any questions.   Total time spent today, including time for chart review and documentation, was 30 minutes.    Kady Fernandes MD, FACC      (Chart documentation was completed, in part, using Dragon voice-recognition software. The note was reviewed, however grammatical and spelling errors may be present.)        Encounter Diagnosis   Name Primary?    Paroxysmal atrial fibrillation (H)        CURRENT MEDICATIONS:  Current Outpatient Medications   Medication Sig Dispense Refill    amiodarone (PACERONE) 200 MG tablet Take 1 tablet (200 mg) by mouth daily 90 tablet 3    apixaban ANTICOAGULANT (ELIQUIS) 5 MG tablet Take 1 tablet (5 mg) by mouth 2 times daily 180 tablet 3    atorvastatin (LIPITOR) 20 MG tablet Take 1 tablet (20 mg) by mouth daily 90 tablet 3    Ca Carbonate-Mag Hydroxide (ROLAIDS PO) Take by mouth as needed      chlorthalidone (HYGROTON) 25 MG tablet Take 1 tablet (25 mg) by mouth daily 90 tablet 0    EPINEPHrine 0.125 MG/ACT AERO Inhale 1 puff into the lungs daily as needed      lisinopril (ZESTRIL) 40 MG tablet Take 1 tablet (40 mg) by mouth daily 90 tablet 3    metoprolol tartrate (LOPRESSOR) 25 MG tablet Take 0.5 tablets (12.5 mg) by mouth 2  "times daily 90 tablet 3    tamsulosin (FLOMAX) 0.4 MG capsule Take 1 capsule (0.4 mg) by mouth daily 90 capsule 3       ALLERGIES     Allergies   Allergen Reactions    Penicillins        PAST MEDICAL HISTORY:  Past Medical History:   Diagnosis Date    Alcohol use     Allergic rhinitis, cause unspecified     Diverticulosis of colon     Diverticulitis 2010    HTN (hypertension)     Memory loss     Mild intermittent asthma     Paroxysmal atrial fibrillation (H) 2016    Pneumothorax     PVC (premature ventricular contraction)     intermittent bigeminy    Rosacea     Scoliosis (and kyphoscoliosis), idiopathic     Stroke (H)        PAST SURGICAL HISTORY:  Past Surgical History:   Procedure Laterality Date    CV CORONARY ANGIOGRAM N/A 2023    Procedure: Coronary Angiogram;  Surgeon: Jennifer Daniel MD;  Location:  HEART CARDIAC CATH LAB    [facility] NONSPECIFIC PROCEDURE  1974    right inguinal herniorraphy    [facility] NONSPECIFIC PROCEDURE  age 16    L inguinal herniorraphy       FAMILY HISTORY:  Family History   Problem Relation Age of Onset    Colon Cancer Father          of this age 69    Alcoholism Father     Heart Disease Mother         \"enlarged heart\"; d: age 75    C.A.D. 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 or more    Drug use: No    Sexual activity: Yes     Partners: Female   Other Topics Concern    Parent/sibling w/ CABG, MI or angioplasty before 65F 55M? No   Social History Narrative    , has 2 children, is retired from his manufacturing job as of age 68.  He goes to the bar daily to socialize and drink -- does not want to stop drinking.  (last updated 10/27/2019)        Review of Systems:  Skin:          Eyes:         ENT:         Respiratory:  Negative       Cardiovascular:    Positive for;dizziness    Gastroenterology:        Genitourinary:         Musculoskeletal:         Neurologic:         Psychiatric:         Heme/Lymph/Imm:  Positive for allergies    Endocrine:  Negative        Physical Exam:  Vitals: /55   Pulse 55   Ht 1.651 m (5' 5\")   Wt 48 kg (105 lb 12.8 oz)   BMI 17.61 kg/m      Constitutional:           Skin:             Head:           Eyes:           Lymph:      ENT:           Neck:           Respiratory:            Cardiac:                                                           GI:           Extremities and Muscular Skeletal:                 Neurological:           Psych:         CC  SAWYER BryantC  [address] [place] [address]        Thank you for allowing me to participate in the care of your patient.      Sincerely,     Kady Fernandes MD     [facility]  "

## 2023-11-14 NOTE — PROGRESS NOTES
PHYSICIAN NOTE:  This visit was completed in person at the [facility].      I had the pleasure of seeing Mr. Gavin House for evaluation of atrial fibrillation.  Previously followed by Dr. Mendez Verma.  I initially met the patient during his June 2023 hospitalization with [facility].    The patient is a pleasant 77-year-old male with the following medical issues:  Paroxysmal atrial fibrillation. Treated with flecainide for rhythm control for years. Presented with wide-complex tachycardia, LBBB pattern, in June 2023. Flecainide was stopped. Recommended sotalol but the patient did not want to stay at the hospital for loading. Amiodarone was started instead.  [id number] at least 5.  On Eliquis.  Hypertension.  CVA diagnosed by brain MRI in 10/2021, showing multiple small strokes.  Alcohol abuse. He has significantly cut back.    ---------------------------------------------------------------------------  He is feeling well. No symptomatic AF events. No chest pain, unusual dyspnea, orthopnea/PND or other cardiac symptoms.    No bleeding issues while on anticoagulation.  ---------------------------------------------------------------------------    PHYSICAL EXAMINATION:  Vital signs: 10 655, 55, 48 kg, BMI 17.6  General: slender elderly gentlemen, in no apparent distress accompanied by his wife  ENT/Mouth:  no nasal discharge.  Eyes:  normal conjunctivae.   Neck:  no thyromegaly or lymphadenopathy.  Chest/Lungs:  patient is not dyspneic. Few crackles at the left base.  Cardiovascular: normal JVP, rhythm is regular.  No gallop, murmur or rub.    Abdomen:  no abdominal distention.  Soft, negative HJR.    Extremities:  no edema  Skin:  no xanthelasma.    Neurologic:  alert & oriented x 3.    Vascular:  2+ carotids without bruits.      DIAGNOSTIC STUDIES:  Laboratory studies: sodium 143, potassium 4.2, creatinine 1.53, hematocrit 41%  ECG: sinus rhythm, possible old anterior MI pattern  Echocardiogram (06/2023): EF 55  - 60%, normal RV, no significant valvular abnormalities.      IMPRESSION:  Paroxysmal atrial fibrillation. Doing well on amiodarone. Will decrease to six days per week. He will need thyroid and liver function tests twice per year and chest x-ray yearly. I congratulated him on cutting back alcohol use.  Hypertension. Controlled.    RECOMMENDATIONS:  Decrease amiodarone to 200 mg 6 d/week (skip on Sundays).  Thyroid and liver function tests.  See Yadira in six months.    It was my pleasure seeing this delightful patient.  Please feel free to call with any questions.   Total time spent today, including time for chart review and documentation, was 30 minutes.    Kady Fernandes MD, [facility]      (Chart documentation was completed, in part, using Dragon voice-recognition software. The note was reviewed, however grammatical and spelling errors may be present.)        Encounter Diagnosis   Name Primary?    Paroxysmal atrial fibrillation (H)        CURRENT MEDICATIONS:  Current Outpatient Medications   Medication Sig Dispense Refill    amiodarone (PACERONE) 200 MG tablet Take 1 tablet (200 mg) by mouth daily 90 tablet 3    apixaban ANTICOAGULANT (ELIQUIS) 5 MG tablet Take 1 tablet (5 mg) by mouth 2 times daily 180 tablet 3    atorvastatin (LIPITOR) 20 MG tablet Take 1 tablet (20 mg) by mouth daily 90 tablet 3    Ca Carbonate-Mag Hydroxide (ROLAIDS PO) Take by mouth as needed      chlorthalidone (HYGROTON) 25 MG tablet Take 1 tablet (25 mg) by mouth daily 90 tablet 0    EPINEPHrine 0.125 MG/ACT AERO Inhale 1 puff into the lungs daily as needed      lisinopril (ZESTRIL) 40 MG tablet Take 1 tablet (40 mg) by mouth daily 90 tablet 3    metoprolol tartrate (LOPRESSOR) 25 MG tablet Take 0.5 tablets (12.5 mg) by mouth 2 times daily 90 tablet 3    tamsulosin (FLOMAX) 0.4 MG capsule Take 1 capsule (0.4 mg) by mouth daily 90 capsule 3       ALLERGIES     Allergies   Allergen Reactions    Penicillins        PAST MEDICAL HISTORY:  Past  "Medical History:   Diagnosis Date    Alcohol use     Allergic rhinitis, cause unspecified     Diverticulosis of colon     Diverticulitis 2010    HTN (hypertension)     Memory loss     Mild intermittent asthma     Paroxysmal atrial fibrillation (H) 2016    Pneumothorax     PVC (premature ventricular contraction)     intermittent bigeminy    Rosacea     Scoliosis (and kyphoscoliosis), idiopathic     Stroke (H)        PAST SURGICAL HISTORY:  Past Surgical History:   Procedure Laterality Date    CV CORONARY ANGIOGRAM N/A 2023    Procedure: Coronary Angiogram;  Surgeon: Jennifer Daniel MD;  Location:  HEART CARDIAC CATH LAB    [facility] NONSPECIFIC PROCEDURE  1974    right inguinal herniorraphy    [facility] NONSPECIFIC PROCEDURE  age 16    L inguinal herniorraphy       FAMILY HISTORY:  Family History   Problem Relation Age of Onset    Colon Cancer Father          of this age 69    Alcoholism Father     Heart Disease Mother         \"enlarged heart\"; d: age 75    C.A.D. 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 or more    Drug use: No    Sexual activity: Yes     Partners: Female   Other Topics Concern    Parent/sibling w/ CABG, MI or angioplasty before 65F 55M? No   Social History Narrative    " ", has 2 children, is retired from his manufacturing job as of age 68.  He goes to the bar daily to socialize and drink -- does not want to stop drinking.  (last updated 10/27/2019)        Review of Systems:  Skin:          Eyes:         ENT:         Respiratory:  Negative       Cardiovascular:    Positive for;dizziness    Gastroenterology:        Genitourinary:         Musculoskeletal:         Neurologic:         Psychiatric:         Heme/Lymph/Imm:  Positive for allergies    Endocrine:  Negative        Physical Exam:  Vitals: /55   Pulse 55   Ht 1.651 m (5' 5\")   Wt 48 kg (105 lb 12.8 oz)   BMI 17.61 kg/m      Constitutional:           Skin:             Head:           Eyes:           Lymph:      ENT:           Neck:           Respiratory:            Cardiac:                                                           GI:           Extremities and Muscular Skeletal:                 Neurological:           Psych:           CC  Yadira Bullock PA-C  [address] [place] [address]                " Primary Defect Width In Cm (Final Defect Size - Required For Flaps/Grafts): 3.5

## 2023-11-20 NOTE — LETTER
"11/20/2023       RE: Gavin House  [address]  [facility] [address]     Dear Colleague,    Thank you for referring your patient, Gavin House, to the [facility] at [facility]. Please see a copy of my visit note below.    CHIEF COMPLAINT   Gavin House who is a 77 year old male returns today for follow-up of BPH with retention.      HPI   Gavin House is a 77 year old male returns today for follow-up of BPH with retention    AUA symptom score 3-1-0-0-0-0-2 = 6 qol mostly satsified    A year ago he passed a TOV, and was continued on tamsulosin, was noted to have constipation    PHYSICAL EXAM  Patient is a 77 year old  male   Vitals: Blood pressure 94/53, pulse 61, height 1.727 m (5' 8\"), weight 47.6 kg (105 lb), SpO2 100%.  Body mass index is 15.97 kg/m .  General Appearance Adult:   Alert, no acute distress, oriented  HENT: throat/mouth:normal, good dentition  Lungs: no respiratory distress, or pursed lip breathing  Heart: No obvious jugular venous distension present  Abdomen: nondistended  Musculoskeltal: extremities normal, no peripheral edema  Skin: no suspicious lesions or rashes  Neuro: Alert, oriented, speech and mentation normal  Psych: affect and mood normal  Gait: Normal  : deferred    PVR 53 ml     Component      Latest Ref Rng 11/20/2023  2:47 PM   Color Urine      Colorless, Straw, Light Yellow, Yellow  Yellow    Appearance Urine      Clear  Clear    Glucose Urine      Negative mg/dL Negative    Bilirubin Urine      Negative  Small !    Ketones Urine      Negative mg/dL Trace !    Specific Gravity Urine      1.003 - 1.035  1.025    Blood Urine      Negative  Negative    pH Urine      5.0 - 7.0  5.5    Protein Albumin Urine      Negative mg/dL Trace !    Urobilinogen Urine      0.2, 1.0 E.U./dL 4.0 !    Nitrite Urine      Negative  Negative    Leukocyte Esterase Urine      Negative  Negative       Legend:  ! " Abnormal    ASSESSMENT and PLAN  77 year old male returns today for follow-up of BPH with retention    BPH with retention: minor symptoms, well controlled on tamsulosin, PVR low. Will refill tamsulosin today. If his PCP is willing to keep refilling tamsulosin, can follow up with urology as needed as issues arise    Trace protenuria, small bilirubin, trace ketonuria. Possible that he has some element of liver dysfunction (n.b. long term EtOH use in the past). Note that liver function tests are being ordered by PCP and cardiologist. Recommend patient stay hydrated    - tamsulosin refilled for one year  - follow up with PCP and cardiology re: liver function tests  - follow up with urology as needed    Cameron Kowalski MD   [facility]  [facility] Phone: [phone]

## 2023-11-20 NOTE — Clinical Note
Dr. Eller, I am seeing your patient for BPH with retention. His symptoms are well controlled on tamsulosin. I will refill it for a year. At this point I am not sure he needs to see urology routinely as long as his symptoms are well controlled on the tamsulosin, would you be ok with taking over his refills and having him come back if he has any worsening issues?  Also, he has trace bilirubin in his urine (as well as urobilinogen). Looks like his LFT are being worked up already  Cameron Kowalski MD  [facility] [phone] clinic phone [phone] (mobile call or text)

## 2023-11-20 NOTE — PROGRESS NOTES
"CHIEF COMPLAINT   Gavin House who is a 77 year old male returns today for follow-up of BPH with retention.      HPI   Gavin House is a 77 year old male returns today for follow-up of BPH with retention    AUA symptom score 3-1-0-0-0-0-2 = 6 qol mostly satsified    A year ago he passed a TOV, and was continued on tamsulosin, was noted to have constipation    PHYSICAL EXAM  Patient is a 77 year old  male   Vitals: Blood pressure 94/53, pulse 61, height 1.727 m (5' 8\"), weight 47.6 kg (105 lb), SpO2 100%.  Body mass index is 15.97 kg/m .  General Appearance Adult:   Alert, no acute distress, oriented  HENT: throat/mouth:normal, good dentition  Lungs: no respiratory distress, or pursed lip breathing  Heart: No obvious jugular venous distension present  Abdomen: nondistended  Musculoskeltal: extremities normal, no peripheral edema  Skin: no suspicious lesions or rashes  Neuro: Alert, oriented, speech and mentation normal  Psych: affect and mood normal  Gait: Normal  : deferred    PVR 53 ml     Component      Latest Ref Rng 11/20/2023  2:47 PM   Color Urine      Colorless, Straw, Light Yellow, Yellow  Yellow    Appearance Urine      Clear  Clear    Glucose Urine      Negative mg/dL Negative    Bilirubin Urine      Negative  Small !    Ketones Urine      Negative mg/dL Trace !    Specific Gravity Urine      1.003 - 1.035  1.025    Blood Urine      Negative  Negative    pH Urine      5.0 - 7.0  5.5    Protein Albumin Urine      Negative mg/dL Trace !    Urobilinogen Urine      0.2, 1.0 E.U./dL 4.0 !    Nitrite Urine      Negative  Negative    Leukocyte Esterase Urine      Negative  Negative       Legend:  ! Abnormal    ASSESSMENT and PLAN  77 year old male returns today for follow-up of BPH with retention    BPH with retention: minor symptoms, well controlled on tamsulosin, PVR low. Will refill tamsulosin today. If his PCP is willing to keep refilling tamsulosin, can follow up with urology as needed as issues " arise    Trace protenuria, small bilirubin, trace ketonuria. Possible that he has some element of liver dysfunction (n.b. long term EtOH use in the past). Note that liver function tests are being ordered by PCP and cardiologist. Recommend patient stay hydrated    - tamsulosin refilled for one year  - follow up with PCP and cardiology re: liver function tests  - follow up with urology as needed    Cameron Kowalski MD   [facility]  [facility] Phone: [phone]

## 2023-11-20 NOTE — NURSING NOTE
Chief Complaint   Patient presents with    Benign Prostatic Hypertrophy    Urinary Retention    2-3 times a night to urinate   Pvr is 53ml done with bladder scan  No leakage  No urgency to urinate   Good flow   Marino Costello, CMA

## 2023-11-29 NOTE — CONFIDENTIAL NOTE
Start potassium chloride 20 mEq once daily. Repeat BMP in 1 week.     Thanks,  Yadira Bullock PA-C on 11/29/2023 at 12:13 PM    Prescription filled and BMP ordered. LVM to review results and recommendations. Will wait for callback.     Sabrina Newell RN  November 29, 2023  12:18 PM

## 2023-12-07 NOTE — TELEPHONE ENCOUNTER
"Nurse Triage SBAR    Is this a 2nd Level Triage? YES, LICENSED PRACTITIONER REVIEW IS REQUIRED    Situation: new onset of poor appetite. Vomiting small amounts (~1 tbls) mucus in the a.m. and after meals.     Background:   Pt's wife calling reporting she is worried pt's appetite hasn't been good and he's vomiting mucus frequently. Pt's wife stating she is worried pt is going to die from not eating.  Pt was triaged and dispo'd to office today. PCP is out of office, and there are no opening available at primary clinic. Pt wanting to wait for appointment with PCP vs seeing a different provider sooner. Routing to provider; please review and advise.   Assessment: MD review/recommend    Protocol Recommended Disposition:   See in Office Today    Recommendation: answer callback from clinic     Routed to provider    Does the patient meet one of the following criteria for ADS visit consideration? 16+ years old, with an [facility] PCP     TIP  Providers, please consider if this condition is appropriate for management at one of our Acute and Diagnostic Services sites.     If patient is a good candidate, please use dotphrase <dot>triageresponse and select Refer to ADS to document.      1. VOMITING SEVERITY: \"How many times have you vomited in the past 24 hours?\"      - MILD:  1 - 2 times/day     - MODERATE: 3 - 5 times/day, decreased oral intake without significant weight loss or symptoms of dehydration     - SEVERE: 6 or more times/day, vomits everything or nearly everything, with significant weight loss, symptoms of dehydration       MILD: Pt vomiting mucus; tablspoon in the morning. And maybe after eating, about twice a day   2. ONSET: \"When did the vomiting begin?\"       Two weeks ago   3. FLUIDS: \"What fluids or food have you vomited up today?\" \"Have you been able to keep any fluids down?\"      Just mucus. Pt had an phlegm emesis this morning. Tolerated turkey sandwich for breakfast.   4. ABDOMEN PAIN: \"Are your having any " "abdomen pain?\" If Yes : \"How bad is it and what does it feel like?\" (e.g., crampy, dull, intermittent, constant)       Denies   5. DIARRHEA: \"Is there any diarrhea?\" If Yes, ask: \"How many times today?\"       Denies   6. CONTACTS: \"Is there anyone else in the family with the same symptoms?\"       Denies   7. CAUSE: \"What do you think is causing your vomiting?\"      New med; temp potassium pending lab results.  8. HYDRATION STATUS: \"Any signs of dehydration?\" (e.g., dry mouth [not only dry lips], too weak to stand) \"When did you last urinate?\"      Pt has been voiding frequently. Tolerating coffee during the day. Fluids pushed.   9. OTHER SYMPTOMS: \"Do you have any other symptoms?\" (e.g., fever, headache, vertigo, vomiting blood or coffee grounds, recent head injury)      Intermittent dizziness continued, no falls. Denies fever headach, shortness of breath or chest pain. Denies head injury or blood.   10. PREGNANCY: \"Is there any chance you are pregnant?\" \"When was your last menstrual period?\"          Reason for Disposition   MILD to MODERATE vomiting (e.g., 1-5 times/day) and lasts > 48 hours (2 days)    Additional Information   Negative: Shock suspected (e.g., cold/pale/clammy skin, too weak to stand, low BP, rapid pulse)   Negative: Difficult to awaken or acting confused (e.g., disoriented, slurred speech)   Negative: Sounds like a life-threatening emergency to the triager   Negative: Vomiting occurs only while coughing   Negative: Pregnant < 20 Weeks and nausea/vomiting began in early pregnancy (i.e., 4-8 weeks pregnant)   Negative: Chest pain   Negative: Headache is main symptom   Negative: Vomiting red blood or black (coffee ground) material   Negative: Vomiting and hernia is more painful or swollen than usual   Negative: Recent head injury (within 3 days)   Negative: Recent abdominal injury (within 7 days)   Negative: Insulin-dependent diabetes and glucose > 240 mg/dL (13 mmol/L)   Negative: Severe pain in " one eye   Negative: SEVERE vomiting (e.g., 6 or more times/day)  (Exception: Patient sounds well, is drinking liquids, does not sound dehydrated, and vomiting has lasted less than 24 hours.)   Negative: MODERATE vomiting (e.g., 3 - 5 times/day) and age > 60 years   Negative: Constant abdominal pain lasting > 2 hours   Negative: High-risk adult (e.g., brain tumor, V-P shunt, hernia)   Negative: Drinking very little and has signs of dehydration (e.g., no urine > 12 hours, very dry mouth, very lightheaded)   Negative: Patient sounds very sick or weak to the triager   Negative: Vomiting and abdomen looks much more swollen than usual   Negative: Vomiting contains bile (green color)   Negative: Fever > 103 F (39.4 C)   Negative: Fever > 101 F (38.3 C) and over 60 years of age   Negative: Fever > 100.0 F (37.8 C) and has a weak immune system (e.g., HIV positive, cancer chemo, organ transplant, splenectomy, chronic steroids)   Negative: Fever > 100.0 F (37.8 C) and bedridden (e.g., CVA, chronic illness, recovering from surgery)   Negative: Taking any of the following medications: digoxin (Lanoxin), lithium, theophylline, phenytoin (Dilantin)   Negative: SEVERE headache and vomiting    Protocols used: Vomiting-A-OH

## 2023-12-07 NOTE — TELEPHONE ENCOUNTER
Patient Contact    Attempt # 1, attempted twice    Was call answered?  No.  Unable to leave message. Phone does not stop ringing.     Upon call back, please assist with scheduling per provider below.     Thank you,  Gabriela Klein RN

## 2023-12-08 NOTE — TELEPHONE ENCOUNTER
Patient Returning Call    Reason for call:  To schedule appointment    Information relayed to patient:  Wife returned call, scheduled patient for 12/20 per Dr. Eller    Patient has additional questions:  No      Okay to leave a detailed message?: n/a

## 2023-12-08 NOTE — TELEPHONE ENCOUNTER
Patient Contact    Attempt # 2    Was call answered?  No.  Left message on voicemail with information to call me back.    Upon call back, please assist with scheduling per Provider below.     Thank you,  Gabriela Klein RN

## 2023-12-13 NOTE — TELEPHONE ENCOUNTER
ANTICOAGULATION DIRECT ORAL ANTICOAGULANT MONITORING    SUBJECTIVE     The [facility] is evaluating Gavin House's Apixaban (Eliquis) as part of its Anticoagulation Monitoring Program.    Indication:Atrial Fibrillation  Current dose per medication list: Apixaban 5 mg BID  Recent hospitalizations/ED/Office Visits for bleeding/clotting concerns: No  Other bleeding or side effect concerns: No  Additional findings: Has follow up lab work scheduled 12/14/23    OBJECTIVE     Age: 78 year old    Wt Readings from Last 2 Encounters:   11/20/23 47.6 kg (105 lb)   11/14/23 48 kg (105 lb 12.8 oz)      Lab Results   Component Value Date    CR 1.54 (H) 11/21/2023    CR 1.53 (H) 08/09/2023    CR 1.04 06/29/2023     Lab Results   Component Value Date    HGB 14.0 07/13/2023    HGB 13.5 05/17/2021     07/13/2023     05/17/2021     ASSESSMENT/PLAN     A chart review for Direct Oral Anticoagulant (DOAC) Stewardship has been completed for:     Dosing: recommend adjustment to Apixaban 2.5 mg BID for weight <= 60 kg and creatinine >= 1.5 mg/dL (consistent with package insert dosing)    Plan made per [facility] anticoagulation protocol    Janis House RN  Anticoagulation Clinic

## 2023-12-15 NOTE — TELEPHONE ENCOUNTER
From: Yadira Bullock PA-C  Sent: 12/15/2023   9:43 AM CST  To: [facility] Core Nurse    BMP reviewed. Potassium has improved with supplementation, now with ANANTH.    Please obtain updated blood pressure readings if patient has any.    I recommend the following:  Decrease potassium to 10 mEq once daily.  HOLD chlorthalidone and lisinopril.     Repeat BMP in 1 week.     I would like to see him in clinic soon if possible. We will likely need to start other agents for BP management.    Yadira Bullock PA-C on 12/15/2023 at 9:41 AM

## 2023-12-15 NOTE — TELEPHONE ENCOUNTER
Patient and wife reviewed results via telephone call. Agreeable to holding Chlorthalidone and Lisinopril for time being. Will also lower potassium intake to 10 mEq daily.     Scheduling messaged to make repeat BMP. Please call patient after BMP results are available and Yadira Bullock will provide recommendations at that time.     Sabrina Newell RN  December 15, 2023  3:37 PM

## 2023-12-15 NOTE — TELEPHONE ENCOUNTER
From: Yadira Bullock PA-C   Sent: 12/15/2023   9:44 AM CST   To: [facility] Core Nurse     Also,     Please ask patient about hydration and recent NSAID use. If using NSAIDS, needs to stop. Increase hydration.     Thanks,   Yadira Bullock PA-C on 12/15/2023 at 9:44 AM

## 2023-12-15 NOTE — TELEPHONE ENCOUNTER
Attempted to contact patient multiple times. Unfortunately there's no voicemail set up on his home phone. His cell phone voicemail box is full at this time.     Will continue to try and call.     Sabrina Newell RN  December 15, 2023  2:29 PM

## 2023-12-20 NOTE — PROGRESS NOTES
Assessment & Plan     Weight loss  Stay off lisinopril and diuretic.  Will check abdominal CT without contrast, given azotemia.  - Comprehensive metabolic panel (BMP + Alb, Alk Phos, ALT, AST, Total. Bili, TP); Future  - CBC with platelets; Future  - UA with Microscopic reflex to Culture - lab collect; Future  - XR Chest 2 Views; Future  - Comprehensive metabolic panel (BMP + Alb, Alk Phos, ALT, AST, Total. Bili, TP)  - CBC with platelets  - UA with Microscopic reflex to Culture - lab collect  - UA Microscopic with Reflex to Culture  - CT Abdomen Pelvis w/o Contrast; Future    Hypokalemia      Essential hypertension      Pleural effusion  Will plan to tap if abdominal CT unrevealing                 Kash Eller MD  [facility]    Marsha Alonso is a 78 year old, presenting for the following health issues:  poor appetite and Vomiting      HPI       Pt has been spitting up small amounts (~1 tbls) mucus in the a.m. and after meals.  Poor appetite concerns. Pt has been losing quite a bit of weight. Pt does not eat much half of a sandwich and 1/2 of broth at night and will vomit after eating due to the phlegm. Issues with going to the bathroom alone. Pt mentioned that eye sight is going.         As above.  Patient failing to thrive over the last several weeks.  Not eating, losing weight.  No significant abdominal pain.    In response to elevated renal indices last week, cardiology stopped his chlorthalidone and lisinopril.  He stopped those approximately 5 days ago.      Review of Systems   Constitutional, HEENT, cardiovascular, pulmonary, gi and gu systems are negative, except as otherwise noted.      Objective    BP 94/56   Pulse 57   Temp 97  F (36.1  C) (Temporal)   Resp 16   Wt 43 kg (94 lb 14.4 oz)   SpO2 97%   BMI 14.43 kg/m    Body mass index is 14.43 kg/m .  Physical Exam   GENERAL: frail  HENT: Mucous membranes slightly dry    RESP: lungs clear to auscultation  - no rales, rhonchi or wheezes  CV: regular rate and rhythm, normal S1 S2, no S3 or S4, no murmur, click or rub, no peripheral edema and peripheral pulses strong  ABDOMEN: soft, nontender, no hepatosplenomegaly, no masses and bowel sounds normal  MS: no gross musculoskeletal defects noted, no edema    Results for orders placed or performed in visit on 12/20/23 (from the past 24 hour(s))   Comprehensive metabolic panel (BMP + Alb, Alk Phos, ALT, AST, Total. Bili, TP)   Result Value Ref Range    Sodium 140 135 - 145 mmol/L    Potassium 4.5 3.4 - 5.3 mmol/L    Chloride 101 94 - 109 mmol/L    Carbon Dioxide (CO2) 27 20 - 32 mmol/L    Anion Gap 12 3 - 14 mmol/L    Urea Nitrogen 56 (H) 7 - 30 mg/dL    Creatinine 2.00 (H) 0.66 - 1.25 mg/dL    GFR Estimate 34 (L) >60 mL/min/1.73m2    Calcium 9.4 8.5 - 10.1 mg/dL    Glucose 99 70 - 99 mg/dL    Alkaline Phosphatase 109 40 - 150 U/L     (H) 0 - 45 U/L     (H) 0 - 70 U/L    Protein Total 7.8 6.8 - 8.8 g/dL    Albumin 3.5 3.4 - 5.0 g/dL    Bilirubin Total 0.8 0.2 - 1.3 mg/dL   CBC with platelets   Result Value Ref Range    WBC Count 6.2 4.0 - 11.0 10e3/uL    RBC Count 4.27 (L) 4.40 - 5.90 10e6/uL    Hemoglobin 13.3 13.3 - 17.7 g/dL    Hematocrit 41.3 40.0 - 53.0 %    MCV 97 78 - 100 fL    MCH 31.1 26.5 - 33.0 pg    MCHC 32.2 31.5 - 36.5 g/dL    RDW 11.9 10.0 - 15.0 %    Platelet Count 308 150 - 450 10e3/uL   UA with Microscopic reflex to Culture - lab collect    Specimen: Urine, Midstream   Result Value Ref Range    Color Urine Yellow Colorless, Straw, Light Yellow, Yellow    Appearance Urine Clear Clear    Glucose Urine Negative Negative mg/dL    Bilirubin Urine Moderate (A) Negative    Ketones Urine 15 (A) Negative mg/dL    Specific Gravity Urine >=1.030 1.003 - 1.035    Blood Urine Negative Negative    pH Urine 5.5 5.0 - 7.0    Protein Albumin Urine Negative Negative mg/dL    Urobilinogen Urine 2.0 (A) 0.2, 1.0 E.U./dL    Nitrite Urine Negative Negative     Leukocyte Esterase Urine Negative Negative   UA Microscopic with Reflex to Culture   Result Value Ref Range    Bacteria Urine Moderate (A) None Seen /HPF    RBC Urine 0-2 0-2 /HPF /HPF    WBC Urine 0-5 0-5 /HPF /HPF    Squamous Epithelials Urine Few (A) None Seen /LPF    Narrative    Urine Culture not indicated       CXR - Increasing left pleural effusion

## 2023-12-21 NOTE — TELEPHONE ENCOUNTER
His renal function has not improved despite holding his chlorthalidone or lisinopril. I'm glad he was seen by his PCP - when he was seen yesterday he was hypotensive and complaining of weight loss. He is likely profoundly dehydrated - please encourage water consumption.    PCP is getting a CT abd/pelvis without contrast. Does he have close follow up with his PCP?    We should also advise him to decrease his Eliquis to 2.5 mg BID (weight below 60 kg and creatinine 2.0).    Thanks,  Yadira Bullock PA-C on 12/21/2023 at 3:37 PM            Contacted patient's wife and does have a close follow up with PCP if they do not here back with results with call Dr. Eller for results.  Will cut Eliquis 5mg in half to 2.5mg BID updated medication list.  Shayy Pillai RN on 12/21/2023 at 4:13 PM

## 2024-01-01 ENCOUNTER — PATIENT OUTREACH (OUTPATIENT)
Dept: ONCOLOGY | Facility: CLINIC | Age: 79
End: 2024-01-01
Payer: COMMERCIAL

## 2024-01-01 ENCOUNTER — APPOINTMENT (OUTPATIENT)
Dept: CT IMAGING | Facility: CLINIC | Age: 79
DRG: 280 | End: 2024-01-01
Attending: EMERGENCY MEDICINE
Payer: COMMERCIAL

## 2024-01-01 ENCOUNTER — PRE VISIT (OUTPATIENT)
Dept: ONCOLOGY | Facility: CLINIC | Age: 79
End: 2024-01-01
Payer: COMMERCIAL

## 2024-01-01 ENCOUNTER — HOSPITAL ENCOUNTER (INPATIENT)
Facility: CLINIC | Age: 79
LOS: 3 days | Discharge: HOSPICE | DRG: 280 | End: 2024-02-05
Attending: EMERGENCY MEDICINE | Admitting: INTERNAL MEDICINE
Payer: COMMERCIAL

## 2024-01-01 ENCOUNTER — DOCUMENTATION ONLY (OUTPATIENT)
Dept: CARE COORDINATION | Facility: CLINIC | Age: 79
End: 2024-01-01
Payer: COMMERCIAL

## 2024-01-01 ENCOUNTER — NURSE TRIAGE (OUTPATIENT)
Dept: INTERNAL MEDICINE | Facility: CLINIC | Age: 79
End: 2024-01-01

## 2024-01-01 ENCOUNTER — TELEPHONE (OUTPATIENT)
Dept: INTERNAL MEDICINE | Facility: CLINIC | Age: 79
End: 2024-01-01
Payer: COMMERCIAL

## 2024-01-01 ENCOUNTER — APPOINTMENT (OUTPATIENT)
Dept: SPEECH THERAPY | Facility: CLINIC | Age: 79
DRG: 280 | End: 2024-01-01
Attending: INTERNAL MEDICINE
Payer: COMMERCIAL

## 2024-01-01 ENCOUNTER — HOSPITAL ENCOUNTER (INPATIENT)
Facility: CLINIC | Age: 79
LOS: 1 days | End: 2024-02-06
Attending: INTERNAL MEDICINE | Admitting: STUDENT IN AN ORGANIZED HEALTH CARE EDUCATION/TRAINING PROGRAM
Payer: MEDICARE

## 2024-01-01 ENCOUNTER — ANCILLARY PROCEDURE (OUTPATIENT)
Dept: CT IMAGING | Facility: CLINIC | Age: 79
End: 2024-01-01
Attending: INTERNAL MEDICINE
Payer: COMMERCIAL

## 2024-01-01 VITALS
HEART RATE: 67 BPM | SYSTOLIC BLOOD PRESSURE: 123 MMHG | BODY MASS INDEX: 15.39 KG/M2 | WEIGHT: 101.19 LBS | TEMPERATURE: 97.4 F | DIASTOLIC BLOOD PRESSURE: 65 MMHG | RESPIRATION RATE: 15 BRPM | OXYGEN SATURATION: 98 %

## 2024-01-01 DIAGNOSIS — J96.01 ACUTE RESPIRATORY FAILURE WITH HYPOXIA (H): ICD-10-CM

## 2024-01-01 DIAGNOSIS — Z91.89 AT HIGH RISK FOR PRESSURE INJURY OF SKIN: Primary | ICD-10-CM

## 2024-01-01 DIAGNOSIS — I21.4 NSTEMI (NON-ST ELEVATED MYOCARDIAL INFARCTION) (H): ICD-10-CM

## 2024-01-01 DIAGNOSIS — R91.8 PULMONARY NODULES: Primary | ICD-10-CM

## 2024-01-01 DIAGNOSIS — I26.99 PULMONARY EMBOLISM, UNSPECIFIED CHRONICITY, UNSPECIFIED PULMONARY EMBOLISM TYPE, UNSPECIFIED WHETHER ACUTE COR PULMONALE PRESENT (H): ICD-10-CM

## 2024-01-01 DIAGNOSIS — J18.9 MULTIFOCAL PNEUMONIA: ICD-10-CM

## 2024-01-01 DIAGNOSIS — R06.00 DYSPNEA, UNSPECIFIED TYPE: Primary | ICD-10-CM

## 2024-01-01 DIAGNOSIS — R06.00 DYSPNEA, UNSPECIFIED TYPE: ICD-10-CM

## 2024-01-01 DIAGNOSIS — J18.1 CONSOLIDATION LUNG (H): ICD-10-CM

## 2024-01-01 DIAGNOSIS — J18.1 CONSOLIDATION LUNG (H): Primary | ICD-10-CM

## 2024-01-01 DIAGNOSIS — J18.9 PNEUMONIA DUE TO INFECTIOUS ORGANISM, UNSPECIFIED LATERALITY, UNSPECIFIED PART OF LUNG: Primary | ICD-10-CM

## 2024-01-01 DIAGNOSIS — J18.9 PNEUMONIA DUE TO INFECTIOUS ORGANISM, UNSPECIFIED LATERALITY, UNSPECIFIED PART OF LUNG: ICD-10-CM

## 2024-01-01 DIAGNOSIS — I10 ESSENTIAL HYPERTENSION: ICD-10-CM

## 2024-01-01 DIAGNOSIS — R06.02 SHORTNESS OF BREATH: ICD-10-CM

## 2024-01-01 DIAGNOSIS — I50.9 CONGESTIVE HEART FAILURE, UNSPECIFIED HF CHRONICITY, UNSPECIFIED HEART FAILURE TYPE (H): ICD-10-CM

## 2024-01-01 LAB
ALBUMIN SERPL BCG-MCNC: 3 G/DL (ref 3.5–5.2)
ALP SERPL-CCNC: 86 U/L (ref 40–150)
ALT SERPL W P-5'-P-CCNC: ABNORMAL U/L
ANION GAP SERPL CALCULATED.3IONS-SCNC: 10 MMOL/L (ref 7–15)
ANION GAP SERPL CALCULATED.3IONS-SCNC: 13 MMOL/L (ref 7–15)
AST SERPL W P-5'-P-CCNC: ABNORMAL U/L
ATRIAL RATE - MUSE: 83 BPM
BASOPHILS # BLD AUTO: 0 10E3/UL (ref 0–0.2)
BASOPHILS NFR BLD AUTO: 0 %
BILIRUB SERPL-MCNC: 1.4 MG/DL
BUN SERPL-MCNC: 17.9 MG/DL (ref 8–23)
BUN SERPL-MCNC: 18 MG/DL (ref 8–23)
CALCIUM SERPL-MCNC: 8.2 MG/DL (ref 8.8–10.2)
CALCIUM SERPL-MCNC: 8.6 MG/DL (ref 8.8–10.2)
CHLORIDE SERPL-SCNC: 93 MMOL/L (ref 98–107)
CHLORIDE SERPL-SCNC: 97 MMOL/L (ref 98–107)
CREAT SERPL-MCNC: 0.87 MG/DL (ref 0.67–1.17)
CREAT SERPL-MCNC: 0.88 MG/DL (ref 0.67–1.17)
CYSTATIN C (ROCHE): 1.1 MG/L (ref 0.6–1)
DEPRECATED HCO3 PLAS-SCNC: 28 MMOL/L (ref 22–29)
DEPRECATED HCO3 PLAS-SCNC: 29 MMOL/L (ref 22–29)
DIASTOLIC BLOOD PRESSURE - MUSE: NORMAL MMHG
EGFRCR SERPLBLD CKD-EPI 2021: 89 ML/MIN/1.73M2
EGFRCR SERPLBLD CKD-EPI 2021: 89 ML/MIN/1.73M2
EOSINOPHIL # BLD AUTO: 0 10E3/UL (ref 0–0.7)
EOSINOPHIL NFR BLD AUTO: 1 %
ERYTHROCYTE [DISTWIDTH] IN BLOOD BY AUTOMATED COUNT: 14 % (ref 10–15)
GFR SERPL CREATININE-BSD FRML MDRD: 64 ML/MIN/1.73M2
GLUCOSE SERPL-MCNC: 114 MG/DL (ref 70–99)
GLUCOSE SERPL-MCNC: 72 MG/DL (ref 70–99)
HCO3 BLDV-SCNC: 30 MMOL/L (ref 21–28)
HCT VFR BLD AUTO: 37.5 % (ref 40–53)
HGB BLD-MCNC: 12 G/DL (ref 13.3–17.7)
HOLD SPECIMEN: NORMAL
HOLD SPECIMEN: NORMAL
IMM GRANULOCYTES # BLD: 0.1 10E3/UL
IMM GRANULOCYTES NFR BLD: 1 %
INTERPRETATION ECG - MUSE: NORMAL
LACTATE BLD-SCNC: 1.9 MMOL/L
LYMPHOCYTES # BLD AUTO: 0.2 10E3/UL (ref 0.8–5.3)
LYMPHOCYTES NFR BLD AUTO: 2 %
MAGNESIUM SERPL-MCNC: 2 MG/DL (ref 1.7–2.3)
MCH RBC QN AUTO: 30 PG (ref 26.5–33)
MCHC RBC AUTO-ENTMCNC: 32 G/DL (ref 31.5–36.5)
MCV RBC AUTO: 94 FL (ref 78–100)
MONOCYTES # BLD AUTO: 0.4 10E3/UL (ref 0–1.3)
MONOCYTES NFR BLD AUTO: 5 %
NEUTROPHILS # BLD AUTO: 8.1 10E3/UL (ref 1.6–8.3)
NEUTROPHILS NFR BLD AUTO: 91 %
NRBC # BLD AUTO: 0 10E3/UL
NRBC BLD AUTO-RTO: 0 /100
NT-PROBNP SERPL-MCNC: 2785 PG/ML (ref 0–1800)
P AXIS - MUSE: 72 DEGREES
PCO2 BLDV: 50 MM HG (ref 40–50)
PH BLDV: 7.39 [PH] (ref 7.32–7.43)
PLATELET # BLD AUTO: 259 10E3/UL (ref 150–450)
PO2 BLDV: 23 MM HG (ref 25–47)
POTASSIUM SERPL-SCNC: 4.1 MMOL/L (ref 3.4–5.3)
POTASSIUM SERPL-SCNC: 5.4 MMOL/L (ref 3.4–5.3)
PR INTERVAL - MUSE: 156 MS
PROCALCITONIN SERPL IA-MCNC: 0.19 NG/ML
PROT SERPL-MCNC: 7.2 G/DL (ref 6.4–8.3)
QRS DURATION - MUSE: 70 MS
QT - MUSE: 434 MS
QTC - MUSE: 509 MS
R AXIS - MUSE: 82 DEGREES
RBC # BLD AUTO: 4 10E6/UL (ref 4.4–5.9)
SAO2 % BLDV: 38 % (ref 70–75)
SODIUM SERPL-SCNC: 134 MMOL/L (ref 135–145)
SODIUM SERPL-SCNC: 136 MMOL/L (ref 135–145)
SYSTOLIC BLOOD PRESSURE - MUSE: NORMAL MMHG
T AXIS - MUSE: 70 DEGREES
TROPONIN T SERPL HS-MCNC: 119 NG/L
UFH PPP CHRO-ACNC: 0.11 IU/ML
VENTRICULAR RATE- MUSE: 83 BPM
WBC # BLD AUTO: 8.8 10E3/UL (ref 4–11)

## 2024-01-01 PROCEDURE — 82040 ASSAY OF SERUM ALBUMIN: CPT | Performed by: EMERGENCY MEDICINE

## 2024-01-01 PROCEDURE — 99232 SBSQ HOSP IP/OBS MODERATE 35: CPT | Performed by: INTERNAL MEDICINE

## 2024-01-01 PROCEDURE — 99207 PR NO BILLABLE SERVICE THIS VISIT: CPT | Performed by: INTERNAL MEDICINE

## 2024-01-01 PROCEDURE — 99207 PR APP CREDIT; MD BILLING SHARED VISIT: CPT | Performed by: INTERNAL MEDICINE

## 2024-01-01 PROCEDURE — 250N000011 HC RX IP 250 OP 636: Performed by: EMERGENCY MEDICINE

## 2024-01-01 PROCEDURE — 82610 CYSTATIN C: CPT | Performed by: INTERNAL MEDICINE

## 2024-01-01 PROCEDURE — G0463 HOSPITAL OUTPT CLINIC VISIT: HCPCS

## 2024-01-01 PROCEDURE — 92526 ORAL FUNCTION THERAPY: CPT | Mod: GN | Performed by: SPEECH-LANGUAGE PATHOLOGIST

## 2024-01-01 PROCEDURE — 84155 ASSAY OF PROTEIN SERUM: CPT | Performed by: EMERGENCY MEDICINE

## 2024-01-01 PROCEDURE — 80048 BASIC METABOLIC PNL TOTAL CA: CPT | Performed by: INTERNAL MEDICINE

## 2024-01-01 PROCEDURE — 84484 ASSAY OF TROPONIN QUANT: CPT | Performed by: EMERGENCY MEDICINE

## 2024-01-01 PROCEDURE — 250N000009 HC RX 250: Performed by: STUDENT IN AN ORGANIZED HEALTH CARE EDUCATION/TRAINING PROGRAM

## 2024-01-01 PROCEDURE — 110N000005 HC R&B HOSPICE, ACCENT

## 2024-01-01 PROCEDURE — 99238 HOSP IP/OBS DSCHRG MGMT 30/<: CPT | Performed by: INTERNAL MEDICINE

## 2024-01-01 PROCEDURE — 71275 CT ANGIOGRAPHY CHEST: CPT

## 2024-01-01 PROCEDURE — 999N000157 HC STATISTIC RCP TIME EA 10 MIN

## 2024-01-01 PROCEDURE — 250N000013 HC RX MED GY IP 250 OP 250 PS 637: Performed by: INTERNAL MEDICINE

## 2024-01-01 PROCEDURE — 82803 BLOOD GASES ANY COMBINATION: CPT

## 2024-01-01 PROCEDURE — 85004 AUTOMATED DIFF WBC COUNT: CPT | Performed by: EMERGENCY MEDICINE

## 2024-01-01 PROCEDURE — 93005 ELECTROCARDIOGRAM TRACING: CPT

## 2024-01-01 PROCEDURE — 94640 AIRWAY INHALATION TREATMENT: CPT

## 2024-01-01 PROCEDURE — 83880 ASSAY OF NATRIURETIC PEPTIDE: CPT | Performed by: EMERGENCY MEDICINE

## 2024-01-01 PROCEDURE — 120N000001 HC R&B MED SURG/OB

## 2024-01-01 PROCEDURE — 85520 HEPARIN ASSAY: CPT | Performed by: INTERNAL MEDICINE

## 2024-01-01 PROCEDURE — 250N000013 HC RX MED GY IP 250 OP 250 PS 637: Performed by: STUDENT IN AN ORGANIZED HEALTH CARE EDUCATION/TRAINING PROGRAM

## 2024-01-01 PROCEDURE — 99497 ADVNCD CARE PLAN 30 MIN: CPT | Mod: 25 | Performed by: INTERNAL MEDICINE

## 2024-01-01 PROCEDURE — 83735 ASSAY OF MAGNESIUM: CPT | Performed by: EMERGENCY MEDICINE

## 2024-01-01 PROCEDURE — 99207 PR APP CREDIT; MD BILLING SHARED VISIT: CPT | Performed by: STUDENT IN AN ORGANIZED HEALTH CARE EDUCATION/TRAINING PROGRAM

## 2024-01-01 PROCEDURE — 71250 CT THORAX DX C-: CPT

## 2024-01-01 PROCEDURE — 36415 COLL VENOUS BLD VENIPUNCTURE: CPT | Performed by: INTERNAL MEDICINE

## 2024-01-01 PROCEDURE — 250N000009 HC RX 250: Performed by: EMERGENCY MEDICINE

## 2024-01-01 PROCEDURE — 84145 PROCALCITONIN (PCT): CPT | Performed by: EMERGENCY MEDICINE

## 2024-01-01 PROCEDURE — 99291 CRITICAL CARE FIRST HOUR: CPT | Mod: 25

## 2024-01-01 PROCEDURE — 250N000009 HC RX 250: Performed by: INTERNAL MEDICINE

## 2024-01-01 PROCEDURE — 36415 COLL VENOUS BLD VENIPUNCTURE: CPT | Performed by: EMERGENCY MEDICINE

## 2024-01-01 PROCEDURE — 99223 1ST HOSP IP/OBS HIGH 75: CPT | Performed by: INTERNAL MEDICINE

## 2024-01-01 PROCEDURE — 99418 PROLNG IP/OBS E/M EA 15 MIN: CPT | Performed by: INTERNAL MEDICINE

## 2024-01-01 PROCEDURE — 92610 EVALUATE SWALLOWING FUNCTION: CPT | Mod: GN | Performed by: SPEECH-LANGUAGE PATHOLOGIST

## 2024-01-01 PROCEDURE — 99238 HOSP IP/OBS DSCHRG MGMT 30/<: CPT | Mod: GV | Performed by: PHYSICIAN ASSISTANT

## 2024-01-01 PROCEDURE — 250N000012 HC RX MED GY IP 250 OP 636 PS 637: Performed by: INTERNAL MEDICINE

## 2024-01-01 RX ORDER — MORPHINE SULFATE 10 MG/5ML
2.5-5 SOLUTION ORAL
Status: DISCONTINUED | OUTPATIENT
Start: 2024-01-01 | End: 2024-01-01 | Stop reason: HOSPADM

## 2024-01-01 RX ORDER — CARBOXYMETHYLCELLULOSE SODIUM 5 MG/ML
1-2 SOLUTION/ DROPS OPHTHALMIC
Status: DISCONTINUED | OUTPATIENT
Start: 2024-01-01 | End: 2024-01-01 | Stop reason: HOSPADM

## 2024-01-01 RX ORDER — NALOXONE HYDROCHLORIDE 0.4 MG/ML
0.1 INJECTION, SOLUTION INTRAMUSCULAR; INTRAVENOUS; SUBCUTANEOUS
Status: DISCONTINUED | OUTPATIENT
Start: 2024-01-01 | End: 2024-01-01 | Stop reason: HOSPADM

## 2024-01-01 RX ORDER — ONDANSETRON 2 MG/ML
4 INJECTION INTRAMUSCULAR; INTRAVENOUS EVERY 6 HOURS PRN
Status: DISCONTINUED | OUTPATIENT
Start: 2024-01-01 | End: 2024-01-01

## 2024-01-01 RX ORDER — ATROPINE SULFATE 10 MG/ML
2 SOLUTION/ DROPS OPHTHALMIC EVERY 4 HOURS PRN
Status: DISCONTINUED | OUTPATIENT
Start: 2024-01-01 | End: 2024-01-01 | Stop reason: HOSPADM

## 2024-01-01 RX ORDER — AMOXICILLIN 250 MG
1 CAPSULE ORAL 2 TIMES DAILY PRN
Status: DISCONTINUED | OUTPATIENT
Start: 2024-01-01 | End: 2024-01-01 | Stop reason: HOSPADM

## 2024-01-01 RX ORDER — HYDROMORPHONE HCL IN WATER/PF 6 MG/30 ML
0.4 PATIENT CONTROLLED ANALGESIA SYRINGE INTRAVENOUS
Status: DISCONTINUED | OUTPATIENT
Start: 2024-01-01 | End: 2024-01-01 | Stop reason: HOSPADM

## 2024-01-01 RX ORDER — LORAZEPAM 0.5 MG/1
0.5 TABLET ORAL EVERY 4 HOURS PRN
Status: DISCONTINUED | OUTPATIENT
Start: 2024-01-01 | End: 2024-01-01

## 2024-01-01 RX ORDER — ONDANSETRON 4 MG/1
4 TABLET, ORALLY DISINTEGRATING ORAL EVERY 6 HOURS PRN
Status: CANCELLED | OUTPATIENT
Start: 2024-01-01

## 2024-01-01 RX ORDER — AMOXICILLIN 250 MG
1 CAPSULE ORAL 2 TIMES DAILY
Status: DISCONTINUED | OUTPATIENT
Start: 2024-01-01 | End: 2024-01-01 | Stop reason: HOSPADM

## 2024-01-01 RX ORDER — NALOXONE HYDROCHLORIDE 0.4 MG/ML
0.1 INJECTION, SOLUTION INTRAMUSCULAR; INTRAVENOUS; SUBCUTANEOUS
Status: DISCONTINUED | OUTPATIENT
Start: 2024-01-01 | End: 2024-01-01

## 2024-01-01 RX ORDER — LORAZEPAM 2 MG/ML
1 INJECTION INTRAMUSCULAR
Status: DISCONTINUED | OUTPATIENT
Start: 2024-01-01 | End: 2024-01-01

## 2024-01-01 RX ORDER — AMIODARONE HYDROCHLORIDE 200 MG/1
200 TABLET ORAL
Status: DISCONTINUED | OUTPATIENT
Start: 2024-01-01 | End: 2024-01-01

## 2024-01-01 RX ORDER — BISACODYL 10 MG
10 SUPPOSITORY, RECTAL RECTAL DAILY PRN
Status: DISCONTINUED | OUTPATIENT
Start: 2024-01-01 | End: 2024-01-01

## 2024-01-01 RX ORDER — ACETAMINOPHEN 650 MG/1
650 SUPPOSITORY RECTAL EVERY 4 HOURS PRN
Status: DISCONTINUED | OUTPATIENT
Start: 2024-01-01 | End: 2024-01-01 | Stop reason: HOSPADM

## 2024-01-01 RX ORDER — MORPHINE SULFATE 20 MG/ML
5 SOLUTION ORAL EVERY 4 HOURS
Status: DISCONTINUED | OUTPATIENT
Start: 2024-01-01 | End: 2024-01-01 | Stop reason: HOSPADM

## 2024-01-01 RX ORDER — ONDANSETRON 2 MG/ML
4 INJECTION INTRAMUSCULAR; INTRAVENOUS EVERY 6 HOURS PRN
Status: DISCONTINUED | OUTPATIENT
Start: 2024-01-01 | End: 2024-01-01 | Stop reason: HOSPADM

## 2024-01-01 RX ORDER — MORPHINE SULFATE 10 MG/5ML
5 SOLUTION ORAL EVERY 4 HOURS
Status: DISCONTINUED | OUTPATIENT
Start: 2024-01-01 | End: 2024-01-01

## 2024-01-01 RX ORDER — ONDANSETRON 4 MG/1
4 TABLET, ORALLY DISINTEGRATING ORAL EVERY 6 HOURS PRN
Status: DISCONTINUED | OUTPATIENT
Start: 2024-01-01 | End: 2024-01-01 | Stop reason: HOSPADM

## 2024-01-01 RX ORDER — IOPAMIDOL 755 MG/ML
44 INJECTION, SOLUTION INTRAVASCULAR ONCE
Status: COMPLETED | OUTPATIENT
Start: 2024-01-01 | End: 2024-01-01

## 2024-01-01 RX ORDER — IPRATROPIUM BROMIDE AND ALBUTEROL SULFATE 2.5; .5 MG/3ML; MG/3ML
3 SOLUTION RESPIRATORY (INHALATION) EVERY 4 HOURS PRN
Status: CANCELLED | OUTPATIENT
Start: 2024-01-01

## 2024-01-01 RX ORDER — LORAZEPAM 0.5 MG/1
0.5 TABLET ORAL
Status: DISCONTINUED | OUTPATIENT
Start: 2024-01-01 | End: 2024-01-01 | Stop reason: DRUGHIGH

## 2024-01-01 RX ORDER — MORPHINE SULFATE 20 MG/ML
5 SOLUTION ORAL EVERY 4 HOURS
Status: CANCELLED | OUTPATIENT
Start: 2024-01-01

## 2024-01-01 RX ORDER — ACETAMINOPHEN 325 MG/1
650 TABLET ORAL EVERY 4 HOURS PRN
Status: DISCONTINUED | OUTPATIENT
Start: 2024-01-01 | End: 2024-01-01

## 2024-01-01 RX ORDER — ACETAMINOPHEN 650 MG/1
650 SUPPOSITORY RECTAL EVERY 4 HOURS PRN
Status: CANCELLED | OUTPATIENT
Start: 2024-01-01

## 2024-01-01 RX ORDER — NALOXONE HYDROCHLORIDE 0.4 MG/ML
0.2 INJECTION, SOLUTION INTRAMUSCULAR; INTRAVENOUS; SUBCUTANEOUS
Status: DISCONTINUED | OUTPATIENT
Start: 2024-01-01 | End: 2024-01-01 | Stop reason: HOSPADM

## 2024-01-01 RX ORDER — LORAZEPAM 2 MG/ML
1 INJECTION INTRAMUSCULAR
Status: CANCELLED | OUTPATIENT
Start: 2024-01-01

## 2024-01-01 RX ORDER — LORAZEPAM 0.5 MG/1
0.5 TABLET ORAL EVERY 4 HOURS PRN
Status: CANCELLED | OUTPATIENT
Start: 2024-01-01

## 2024-01-01 RX ORDER — MORPHINE SULFATE 10 MG/5ML
2.5-5 SOLUTION ORAL
Status: DISCONTINUED | OUTPATIENT
Start: 2024-01-01 | End: 2024-01-01

## 2024-01-01 RX ORDER — NALOXONE HYDROCHLORIDE 0.4 MG/ML
0.4 INJECTION, SOLUTION INTRAMUSCULAR; INTRAVENOUS; SUBCUTANEOUS
Status: DISCONTINUED | OUTPATIENT
Start: 2024-01-01 | End: 2024-01-01

## 2024-01-01 RX ORDER — BISACODYL 10 MG
10 SUPPOSITORY, RECTAL RECTAL
Status: CANCELLED | OUTPATIENT
Start: 2024-01-01

## 2024-01-01 RX ORDER — LORAZEPAM 2 MG/ML
1 INJECTION INTRAMUSCULAR
Status: DISCONTINUED | OUTPATIENT
Start: 2024-01-01 | End: 2024-01-01 | Stop reason: HOSPADM

## 2024-01-01 RX ORDER — TAMSULOSIN HYDROCHLORIDE 0.4 MG/1
0.4 CAPSULE ORAL DAILY
Status: DISCONTINUED | OUTPATIENT
Start: 2024-01-01 | End: 2024-01-01 | Stop reason: HOSPADM

## 2024-01-01 RX ORDER — ACETAMINOPHEN 650 MG/1
650 SUPPOSITORY RECTAL EVERY 4 HOURS PRN
Status: DISCONTINUED | OUTPATIENT
Start: 2024-01-01 | End: 2024-01-01

## 2024-01-01 RX ORDER — MORPHINE SULFATE 20 MG/ML
5 SOLUTION ORAL
Status: DISCONTINUED | OUTPATIENT
Start: 2024-01-01 | End: 2024-01-01 | Stop reason: HOSPADM

## 2024-01-01 RX ORDER — CEFTRIAXONE 1 G/1
1 INJECTION, POWDER, FOR SOLUTION INTRAMUSCULAR; INTRAVENOUS ONCE
Qty: 20 ML | Refills: 0 | Status: COMPLETED | OUTPATIENT
Start: 2024-01-01 | End: 2024-01-01

## 2024-01-01 RX ORDER — PROCHLORPERAZINE MALEATE 5 MG
5 TABLET ORAL EVERY 6 HOURS PRN
Status: DISCONTINUED | OUTPATIENT
Start: 2024-01-01 | End: 2024-01-01 | Stop reason: HOSPADM

## 2024-01-01 RX ORDER — METOPROLOL TARTRATE 25 MG/1
12.5 TABLET, FILM COATED ORAL 2 TIMES DAILY
Qty: 60 TABLET | Refills: 0 | Status: SHIPPED | OUTPATIENT
Start: 2024-01-01

## 2024-01-01 RX ORDER — BISACODYL 10 MG
10 SUPPOSITORY, RECTAL RECTAL
Status: DISCONTINUED | OUTPATIENT
Start: 2024-01-01 | End: 2024-01-01 | Stop reason: HOSPADM

## 2024-01-01 RX ORDER — LORAZEPAM 0.5 MG/1
0.5 TABLET ORAL EVERY 4 HOURS PRN
Status: DISCONTINUED | OUTPATIENT
Start: 2024-01-01 | End: 2024-01-01 | Stop reason: HOSPADM

## 2024-01-01 RX ORDER — LORAZEPAM 0.5 MG/1
0.5 TABLET ORAL EVERY 6 HOURS PRN
Status: DISCONTINUED | OUTPATIENT
Start: 2024-01-01 | End: 2024-01-01 | Stop reason: HOSPADM

## 2024-01-01 RX ORDER — SALIVA STIMULANT COMB. NO.3
1 SPRAY, NON-AEROSOL (ML) MUCOUS MEMBRANE
Status: DISCONTINUED | OUTPATIENT
Start: 2024-01-01 | End: 2024-01-01 | Stop reason: HOSPADM

## 2024-01-01 RX ORDER — ONDANSETRON 4 MG/1
4 TABLET, ORALLY DISINTEGRATING ORAL EVERY 6 HOURS PRN
Status: DISCONTINUED | OUTPATIENT
Start: 2024-01-01 | End: 2024-01-01

## 2024-01-01 RX ORDER — IPRATROPIUM BROMIDE AND ALBUTEROL SULFATE 2.5; .5 MG/3ML; MG/3ML
3 SOLUTION RESPIRATORY (INHALATION) EVERY 4 HOURS PRN
Status: DISCONTINUED | OUTPATIENT
Start: 2024-01-01 | End: 2024-01-01 | Stop reason: HOSPADM

## 2024-01-01 RX ORDER — ALBUTEROL SULFATE 90 UG/1
2 AEROSOL, METERED RESPIRATORY (INHALATION) EVERY 6 HOURS PRN
Qty: 18 G | Refills: 3 | Status: SHIPPED | OUTPATIENT
Start: 2024-01-01

## 2024-01-01 RX ORDER — ATORVASTATIN CALCIUM 20 MG/1
20 TABLET, FILM COATED ORAL DAILY
Status: DISCONTINUED | OUTPATIENT
Start: 2024-01-01 | End: 2024-01-01

## 2024-01-01 RX ORDER — LIDOCAINE 40 MG/G
CREAM TOPICAL
Status: DISCONTINUED | OUTPATIENT
Start: 2024-01-01 | End: 2024-01-01 | Stop reason: HOSPADM

## 2024-01-01 RX ORDER — FUROSEMIDE 10 MG/ML
10 INJECTION INTRAMUSCULAR; INTRAVENOUS ONCE
Status: DISCONTINUED | OUTPATIENT
Start: 2024-01-01 | End: 2024-01-01

## 2024-01-01 RX ORDER — ACETAMINOPHEN 325 MG/1
650 TABLET ORAL EVERY 6 HOURS PRN
Status: DISCONTINUED | OUTPATIENT
Start: 2024-01-01 | End: 2024-01-01 | Stop reason: HOSPADM

## 2024-01-01 RX ORDER — CEFTRIAXONE 1 G/1
1 INJECTION, POWDER, FOR SOLUTION INTRAMUSCULAR; INTRAVENOUS EVERY 24 HOURS
Status: DISCONTINUED | OUTPATIENT
Start: 2024-01-01 | End: 2024-01-01

## 2024-01-01 RX ORDER — CARBOXYMETHYLCELLULOSE SODIUM 5 MG/ML
1-2 SOLUTION/ DROPS OPHTHALMIC
Status: DISCONTINUED | OUTPATIENT
Start: 2024-01-01 | End: 2024-01-01

## 2024-01-01 RX ORDER — MORPHINE SULFATE 20 MG/ML
10 SOLUTION ORAL
Status: DISCONTINUED | OUTPATIENT
Start: 2024-01-01 | End: 2024-01-01 | Stop reason: HOSPADM

## 2024-01-01 RX ORDER — MORPHINE SULFATE 10 MG/5ML
10 SOLUTION ORAL
Status: DISCONTINUED | OUTPATIENT
Start: 2024-01-01 | End: 2024-01-01 | Stop reason: HOSPADM

## 2024-01-01 RX ORDER — NALOXONE HYDROCHLORIDE 0.4 MG/ML
0.2 INJECTION, SOLUTION INTRAMUSCULAR; INTRAVENOUS; SUBCUTANEOUS
Status: DISCONTINUED | OUTPATIENT
Start: 2024-01-01 | End: 2024-01-01

## 2024-01-01 RX ORDER — MORPHINE SULFATE 10 MG/5ML
5 SOLUTION ORAL
Start: 2024-01-01

## 2024-01-01 RX ORDER — ACETAMINOPHEN 325 MG/1
650 TABLET ORAL EVERY 6 HOURS PRN
Status: DISCONTINUED | OUTPATIENT
Start: 2024-01-01 | End: 2024-01-01

## 2024-01-01 RX ORDER — PROCHLORPERAZINE 25 MG
12.5 SUPPOSITORY, RECTAL RECTAL EVERY 12 HOURS PRN
Status: DISCONTINUED | OUTPATIENT
Start: 2024-01-01 | End: 2024-01-01 | Stop reason: HOSPADM

## 2024-01-01 RX ORDER — ALBUTEROL SULFATE 90 UG/1
2 AEROSOL, METERED RESPIRATORY (INHALATION) EVERY 6 HOURS PRN
Status: DISCONTINUED | OUTPATIENT
Start: 2024-01-01 | End: 2024-01-01 | Stop reason: HOSPADM

## 2024-01-01 RX ORDER — ATROPINE SULFATE 10 MG/ML
2 SOLUTION/ DROPS OPHTHALMIC EVERY 4 HOURS PRN
Status: CANCELLED | OUTPATIENT
Start: 2024-01-01

## 2024-01-01 RX ORDER — CARBOXYMETHYLCELLULOSE SODIUM 5 MG/ML
1-2 SOLUTION/ DROPS OPHTHALMIC
Status: CANCELLED | OUTPATIENT
Start: 2024-01-01

## 2024-01-01 RX ORDER — HYDROMORPHONE HCL IN WATER/PF 6 MG/30 ML
0.2 PATIENT CONTROLLED ANALGESIA SYRINGE INTRAVENOUS
Status: DISCONTINUED | OUTPATIENT
Start: 2024-01-01 | End: 2024-01-01 | Stop reason: HOSPADM

## 2024-01-01 RX ORDER — MORPHINE SULFATE 10 MG/5ML
5 SOLUTION ORAL EVERY 4 HOURS
Status: DISCONTINUED | OUTPATIENT
Start: 2024-01-01 | End: 2024-01-01 | Stop reason: HOSPADM

## 2024-01-01 RX ORDER — PREDNISONE 20 MG/1
20 TABLET ORAL DAILY
Qty: 7 TABLET | Refills: 0 | Status: SHIPPED | OUTPATIENT
Start: 2024-01-01

## 2024-01-01 RX ORDER — HEPARIN SODIUM 10000 [USP'U]/100ML
0-5000 INJECTION, SOLUTION INTRAVENOUS CONTINUOUS
Status: DISCONTINUED | OUTPATIENT
Start: 2024-01-01 | End: 2024-01-01

## 2024-01-01 RX ORDER — MORPHINE SULFATE 20 MG/ML
5 SOLUTION ORAL EVERY 4 HOURS
Status: DISCONTINUED | OUTPATIENT
Start: 2024-01-01 | End: 2024-01-01

## 2024-01-01 RX ORDER — NALOXONE HYDROCHLORIDE 0.4 MG/ML
0.4 INJECTION, SOLUTION INTRAMUSCULAR; INTRAVENOUS; SUBCUTANEOUS
Status: DISCONTINUED | OUTPATIENT
Start: 2024-01-01 | End: 2024-01-01 | Stop reason: HOSPADM

## 2024-01-01 RX ORDER — ONDANSETRON 2 MG/ML
4 INJECTION INTRAMUSCULAR; INTRAVENOUS EVERY 6 HOURS PRN
Status: CANCELLED | OUTPATIENT
Start: 2024-01-01

## 2024-01-01 RX ORDER — CHLORTHALIDONE 25 MG/1
25 TABLET ORAL DAILY
Status: ON HOLD | COMMUNITY
End: 2024-01-01

## 2024-01-01 RX ORDER — MORPHINE SULFATE 10 MG/5ML
5 SOLUTION ORAL
Status: DISCONTINUED | OUTPATIENT
Start: 2024-01-01 | End: 2024-01-01 | Stop reason: HOSPADM

## 2024-01-01 RX ORDER — PREDNISONE 20 MG/1
20 TABLET ORAL DAILY
Start: 2024-01-01 | End: 2024-01-01

## 2024-01-01 RX ORDER — MORPHINE SULFATE 10 MG/5ML
5 SOLUTION ORAL EVERY 4 HOURS
Status: CANCELLED | OUTPATIENT
Start: 2024-01-01

## 2024-01-01 RX ORDER — AMOXICILLIN 250 MG
2 CAPSULE ORAL 2 TIMES DAILY PRN
Status: DISCONTINUED | OUTPATIENT
Start: 2024-01-01 | End: 2024-01-01 | Stop reason: HOSPADM

## 2024-01-01 RX ORDER — ALBUTEROL SULFATE 90 UG/1
2 AEROSOL, METERED RESPIRATORY (INHALATION) EVERY 6 HOURS PRN
Qty: 18 G | Refills: 3 | OUTPATIENT
Start: 2024-01-01

## 2024-01-01 RX ORDER — LORAZEPAM 2 MG/ML
0.5 INJECTION INTRAMUSCULAR
Status: DISCONTINUED | OUTPATIENT
Start: 2024-01-01 | End: 2024-01-01 | Stop reason: DRUGHIGH

## 2024-01-01 RX ORDER — POLYETHYLENE GLYCOL 3350 17 G/17G
17 POWDER, FOR SOLUTION ORAL 2 TIMES DAILY PRN
Status: DISCONTINUED | OUTPATIENT
Start: 2024-01-01 | End: 2024-01-01 | Stop reason: HOSPADM

## 2024-01-01 RX ORDER — ACETAMINOPHEN 650 MG/1
650 SUPPOSITORY RECTAL EVERY 6 HOURS PRN
Status: DISCONTINUED | OUTPATIENT
Start: 2024-01-01 | End: 2024-01-01 | Stop reason: HOSPADM

## 2024-01-01 RX ORDER — AZITHROMYCIN 500 MG/1
500 INJECTION, POWDER, LYOPHILIZED, FOR SOLUTION INTRAVENOUS ONCE
Qty: 5 ML | Refills: 0 | Status: COMPLETED | OUTPATIENT
Start: 2024-01-01 | End: 2024-01-01

## 2024-01-01 RX ORDER — PREDNISONE 20 MG/1
20 TABLET ORAL DAILY
Status: DISCONTINUED | OUTPATIENT
Start: 2024-01-01 | End: 2024-01-01

## 2024-01-01 RX ORDER — ATROPINE SULFATE 10 MG/ML
2 SOLUTION/ DROPS OPHTHALMIC EVERY 4 HOURS PRN
Status: DISCONTINUED | OUTPATIENT
Start: 2024-01-01 | End: 2024-01-01

## 2024-01-01 RX ORDER — PREDNISONE 20 MG/1
20 TABLET ORAL DAILY
Qty: 7 TABLET | Refills: 0 | Status: ON HOLD | OUTPATIENT
Start: 2024-01-01 | End: 2024-01-01

## 2024-01-01 RX ORDER — LEVOFLOXACIN 250 MG/1
250 TABLET, FILM COATED ORAL EVERY OTHER DAY
Qty: 5 TABLET | Refills: 0 | Status: SHIPPED | OUTPATIENT
Start: 2024-01-01 | End: 2024-01-01

## 2024-01-01 RX ORDER — CHLORTHALIDONE 25 MG/1
25 TABLET ORAL DAILY
Qty: 30 TABLET | Refills: 0 | OUTPATIENT
Start: 2024-01-01

## 2024-01-01 RX ORDER — MORPHINE SULFATE 10 MG/5ML
2.5-5 SOLUTION ORAL
Status: CANCELLED | OUTPATIENT
Start: 2024-01-01

## 2024-01-01 RX ORDER — OXYCODONE HYDROCHLORIDE 5 MG/1
5 TABLET ORAL EVERY 4 HOURS PRN
Status: DISCONTINUED | OUTPATIENT
Start: 2024-01-01 | End: 2024-01-01 | Stop reason: ALTCHOICE

## 2024-01-01 RX ORDER — CEFTRIAXONE 2 G/1
2 INJECTION, POWDER, FOR SOLUTION INTRAMUSCULAR; INTRAVENOUS EVERY 24 HOURS
Qty: 140 ML | Refills: 0 | Status: DISCONTINUED | OUTPATIENT
Start: 2024-01-01 | End: 2024-01-01

## 2024-01-01 RX ADMIN — MORPHINE SULFATE 5 MG: 100 SOLUTION ORAL at 12:08

## 2024-01-01 RX ADMIN — MORPHINE SULFATE 5 MG: 100 SOLUTION ORAL at 08:17

## 2024-01-01 RX ADMIN — IPRATROPIUM BROMIDE AND ALBUTEROL SULFATE 3 ML: .5; 3 SOLUTION RESPIRATORY (INHALATION) at 09:59

## 2024-01-01 RX ADMIN — SENNOSIDES AND DOCUSATE SODIUM 1 TABLET: 50; 8.6 TABLET ORAL at 20:55

## 2024-01-01 RX ADMIN — ALBUTEROL SULFATE 2 PUFF: 90 AEROSOL, METERED RESPIRATORY (INHALATION) at 07:17

## 2024-01-01 RX ADMIN — SENNOSIDES AND DOCUSATE SODIUM 1 TABLET: 50; 8.6 TABLET ORAL at 23:10

## 2024-01-01 RX ADMIN — ALBUTEROL SULFATE 2 PUFF: 90 AEROSOL, METERED RESPIRATORY (INHALATION) at 14:40

## 2024-01-01 RX ADMIN — SENNOSIDES AND DOCUSATE SODIUM 1 TABLET: 50; 8.6 TABLET ORAL at 08:41

## 2024-01-01 RX ADMIN — MORPHINE SULFATE 5 MG: 100 SOLUTION ORAL at 04:25

## 2024-01-01 RX ADMIN — MORPHINE SULFATE 5 MG: 100 SOLUTION ORAL at 03:55

## 2024-01-01 RX ADMIN — LORAZEPAM 0.5 MG: 0.5 TABLET ORAL at 09:24

## 2024-01-01 RX ADMIN — ATROPINE SULFATE 2 DROP: 10 SOLUTION/ DROPS OPHTHALMIC at 08:54

## 2024-01-01 RX ADMIN — MORPHINE SULFATE 5 MG: 100 SOLUTION ORAL at 12:00

## 2024-01-01 RX ADMIN — CEFTRIAXONE 1 G: 1 INJECTION, POWDER, FOR SOLUTION INTRAMUSCULAR; INTRAVENOUS at 01:06

## 2024-01-01 RX ADMIN — ATROPINE SULFATE 2 DROP: 10 SOLUTION/ DROPS OPHTHALMIC at 01:12

## 2024-01-01 RX ADMIN — METOPROLOL TARTRATE 12.5 MG: 25 TABLET, FILM COATED ORAL at 08:41

## 2024-01-01 RX ADMIN — METOPROLOL TARTRATE 12.5 MG: 25 TABLET, FILM COATED ORAL at 10:30

## 2024-01-01 RX ADMIN — ALBUTEROL SULFATE 2 PUFF: 90 AEROSOL, METERED RESPIRATORY (INHALATION) at 20:38

## 2024-01-01 RX ADMIN — HEPARIN SODIUM AND DEXTROSE 700 UNITS/HR: 10000; 5 INJECTION INTRAVENOUS at 01:38

## 2024-01-01 RX ADMIN — MORPHINE SULFATE 5 MG: 10 SOLUTION ORAL at 01:30

## 2024-01-01 RX ADMIN — SODIUM CHLORIDE 74 ML: 9 INJECTION, SOLUTION INTRAVENOUS at 23:11

## 2024-01-01 RX ADMIN — MORPHINE SULFATE 5 MG: 10 SOLUTION ORAL at 23:32

## 2024-01-01 RX ADMIN — AZITHROMYCIN MONOHYDRATE 500 MG: 500 INJECTION, POWDER, LYOPHILIZED, FOR SOLUTION INTRAVENOUS at 01:40

## 2024-01-01 RX ADMIN — LORAZEPAM 0.5 MG: 0.5 TABLET ORAL at 08:54

## 2024-01-01 RX ADMIN — IOPAMIDOL 44 ML: 755 INJECTION, SOLUTION INTRAVENOUS at 23:11

## 2024-01-01 RX ADMIN — TAMSULOSIN HYDROCHLORIDE 0.4 MG: 0.4 CAPSULE ORAL at 08:41

## 2024-01-01 RX ADMIN — MORPHINE SULFATE 2.5 MG: 10 SOLUTION ORAL at 10:15

## 2024-01-01 RX ADMIN — MORPHINE SULFATE 5 MG: 10 SOLUTION ORAL at 13:23

## 2024-01-01 RX ADMIN — MORPHINE SULFATE 5 MG: 100 SOLUTION ORAL at 22:10

## 2024-01-01 RX ADMIN — MORPHINE SULFATE 5 MG: 10 SOLUTION ORAL at 23:53

## 2024-01-01 RX ADMIN — MORPHINE SULFATE 5 MG: 100 SOLUTION ORAL at 20:43

## 2024-01-01 RX ADMIN — ALBUTEROL SULFATE 2 PUFF: 90 AEROSOL, METERED RESPIRATORY (INHALATION) at 01:13

## 2024-01-01 RX ADMIN — MORPHINE SULFATE 5 MG: 100 SOLUTION ORAL at 16:22

## 2024-01-01 RX ADMIN — LORAZEPAM 0.5 MG: 0.5 TABLET ORAL at 16:41

## 2024-01-01 RX ADMIN — MORPHINE SULFATE 5 MG: 100 SOLUTION ORAL at 16:35

## 2024-01-01 RX ADMIN — MORPHINE SULFATE 5 MG: 10 SOLUTION ORAL at 04:08

## 2024-01-01 RX ADMIN — TAMSULOSIN HYDROCHLORIDE 0.4 MG: 0.4 CAPSULE ORAL at 10:30

## 2024-01-01 RX ADMIN — METOPROLOL TARTRATE 12.5 MG: 25 TABLET, FILM COATED ORAL at 23:10

## 2024-01-01 RX ADMIN — METOPROLOL TARTRATE 12.5 MG: 25 TABLET, FILM COATED ORAL at 20:55

## 2024-01-01 RX ADMIN — MORPHINE SULFATE 5 MG: 100 SOLUTION ORAL at 14:31

## 2024-01-01 RX ADMIN — MORPHINE SULFATE 5 MG: 100 SOLUTION ORAL at 01:12

## 2024-01-01 RX ADMIN — ALBUTEROL SULFATE 2 PUFF: 90 AEROSOL, METERED RESPIRATORY (INHALATION) at 08:01

## 2024-01-01 RX ADMIN — MORPHINE SULFATE 5 MG: 10 SOLUTION ORAL at 16:33

## 2024-01-01 RX ADMIN — MORPHINE SULFATE 5 MG: 100 SOLUTION ORAL at 08:13

## 2024-01-01 ASSESSMENT — ACTIVITIES OF DAILY LIVING (ADL)
ADLS_ACUITY_SCORE: 45
ADLS_ACUITY_SCORE: 44
ADLS_ACUITY_SCORE: 37
ADLS_ACUITY_SCORE: 44
DEPENDENT_IADLS:: CLEANING;COOKING;LAUNDRY;SHOPPING;MEAL PREPARATION;MEDICATION MANAGEMENT;MONEY MANAGEMENT;TRANSPORTATION;INCONTINENCE
ADLS_ACUITY_SCORE: 45
ADLS_ACUITY_SCORE: 35
ADLS_ACUITY_SCORE: 37
ADLS_ACUITY_SCORE: 45
ADLS_ACUITY_SCORE: 37
ADLS_ACUITY_SCORE: 35
ADLS_ACUITY_SCORE: 44
ADLS_ACUITY_SCORE: 35
ADLS_ACUITY_SCORE: 51
ADLS_ACUITY_SCORE: 37
ADLS_ACUITY_SCORE: 35
ADLS_ACUITY_SCORE: 45
ADLS_ACUITY_SCORE: 37
ADLS_ACUITY_SCORE: 35
ADLS_ACUITY_SCORE: 37
ADLS_ACUITY_SCORE: 44
ADLS_ACUITY_SCORE: 35
ADLS_ACUITY_SCORE: 45
ADLS_ACUITY_SCORE: 44
ADLS_ACUITY_SCORE: 37
ADLS_ACUITY_SCORE: 44
ADLS_ACUITY_SCORE: 35
ADLS_ACUITY_SCORE: 51
ADLS_ACUITY_SCORE: 47
ADLS_ACUITY_SCORE: 37
ADLS_ACUITY_SCORE: 45
ADLS_ACUITY_SCORE: 44
ADLS_ACUITY_SCORE: 35
ADLS_ACUITY_SCORE: 44
ADLS_ACUITY_SCORE: 35
ADLS_ACUITY_SCORE: 47
ADLS_ACUITY_SCORE: 44
ADLS_ACUITY_SCORE: 35
ADLS_ACUITY_SCORE: 47
ADLS_ACUITY_SCORE: 45
ADLS_ACUITY_SCORE: 37
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
## 2024-01-09 NOTE — TELEPHONE ENCOUNTER
See 12/15/23 cardiology note. Apixaban dose reduced to 2.5 mg BID for persistently elevated serum creatinine > 1.5 mg/dL and weight < 60 kg.     Ana Jacobo, DeanD, BCPS

## 2024-01-10 NOTE — TELEPHONE ENCOUNTER
Molly RN messaged [facility] rns to follow up on encounter   Called wife and informed that Dr. Eller is gone today but will put note on desk for tomorrow

## 2024-01-11 NOTE — PROGRESS NOTES
New IP (Interventional Pulmonology) referral rec'd.  Chart reviewed.       [facility]    Referred to (specialty): Interventional Pulmonary (Lung nodule)    Requested provider (if applicable): n/a    Date Referral Received: 1/11/2024    Evaluation for :  Nodule vs consolidation with pleural plaquing    Clinical History (per Nurse review of records provided):    **BOOK MARKED**    9/27/23:  General PFT Lab      EXAM: CT ABDOMEN PELVIS W/O CONTRAST  LOCATION: [facility]  DATE: 12/27/2023     INDICATION:  Weight loss  COMPARISON: CT abdomen pelvis 05/18/2010.  TECHNIQUE: CT scan of the abdomen and pelvis was performed without IV contrast. Multiplanar reformats were obtained. Dose reduction techniques were used.  CONTRAST: None.     FINDINGS:   LOWER CHEST: Calcified pleural plaques, right greater than left. Small left pleural effusion. Rounded areas of masslike consolidation within the left lung base. Trace right pleural effusion. Subsegmental atelectasis of the right lower lobe. Moderate size   hiatal hernia. Circumferential wall thickening of the distal esophagus. Partially imaged calcified subcarinal lymph nodes. Normal heart size.     HEPATOBILIARY: Unremarkable noncontrast appearance of the liver. Hyperdense material within the gallbladder may represent biliary sludge or tiny gallstones.     PANCREAS: Normal.     SPLEEN: Few calcified splenic granulomas.     ADRENAL GLANDS: Normal.     KIDNEYS/BLADDER: No hydronephrosis. Nonobstructing punctate 2 mm calculus of the right interpolar kidney. The bladder is decompressed, limiting evaluation.     BOWEL: Colonic diverticulosis without focal inflammatory change. Moderate burden of stool throughout the colon and rectum. No evidence of bowel obstruction.     LYMPH NODES: No lymphadenopathy.     VASCULATURE: Moderate burden of vascular calcifications without  abdominal aortic aneurysm.     PELVIC ORGANS: Prostatomegaly with prostate measuring up to 5.3 cm transverse dimension. Probable right scrotal hydrocele.     MUSCULOSKELETAL: No aggressive appearing osseous lesion. Partially imaged thoracolumbar scoliosis with associated degenerative changes of the spine.                                                                      IMPRESSION:   1.  Calcified pleural plaques of the lung bases, small left pleural effusion, and areas of rounded masslike consolidation within the left lung base which may represent round atelectasis. Recommend dedicated chest CT for further evaluation of these   findings which could be seen with prior asbestos exposure.  2.  No specific findings for malignancy in the abdomen or pelvis.  3.  Ancillary findings as above including colonic diverticulosis and prostatomegaly.  Records Location: [facility]     Records Needed: none    Additional testing needed prior to consult: CT CHest

## 2024-01-11 NOTE — TELEPHONE ENCOUNTER
Spoke with patient's wife, reviewed results of CT.  For assistance with additional imaging and possible intervention, will refer to pulmonary nodule clinic.  Patient's wife in agreement.

## 2024-01-17 NOTE — PROGRESS NOTES
Writer notified by Dr. Rosas's clinic that her 1/22 clinic is virtual only and Gavin is scheduled for IN PERSON. Writer notified NPS to call pt to either reschedule to VIDEO if they have capability or IN PERSON with next soonest IP provider.     Per NPS, Pt and wife were called and are unable to do virtual visits and declined any other locations other than [place] or [place] to be seen. Pt was rescheduled with Dr. Estrada on 2/27 in [place].     Message sent to Dr. Jamison to review CT chest results from 1/16/23 to verify if timing is appropriate.     Tanmay Jamison MD Anderson, Amanda E, RN; Kimmy Estrada MD  Cc: Cielo Guidry RN; Migdalia Graff RN  I think so. Please prescribe antibiotics (levaquin 500 po for 14days) if did not recently get any antibiotics. Please repeat CT when they see Dr Estrada.     As he is not an established pt of Interventional Pulmonology yet, will send this request to pt's PCP, Dr. Kash Eller.    Writer called Gavin and spoke with his spouse, Leigha. Writer explained Dr. Jamison's recommendation to Leigha. She verbalized understanding. Writer explained I have sent a message to Dr. Eller about ordering the antibiotic as Gavin is not an established pt of Interventional Pulmonology yet. Leigha.    Leigha reports Gavin is having trouble with trouble breathing and reports he needs a refill on an inhaler that Dr. Eller had prescribed in the past. She reports he did  some Primatene Mist which helps him when he is very short of breath.     Per chart review, Dr. Eller has entered orders for the Levaquin and Albuterol. Writer advised Leigha that Dr. Eller's clinic may be calling them to review the plan.     Gavin is now scheduled for follow up CT on February 20th and will see Dr. Estrada February 27th. They were given the new patient scheduling phone number if they have any further questions or concerns before his appt. All questions answered.     CHERI Taylor, RN, OCN  M  [facility] Oncology Nurse Navigator  [phone] / [phone]       same name as above

## 2024-01-17 NOTE — TELEPHONE ENCOUNTER
Called and spoke with pt. Pt gave verbal consent to speak to his wife about his health information.     Relayed Dr Eller's message from below. Pts wife is appreciative.

## 2024-01-17 NOTE — PROGRESS NOTES
Script for renally dosed Levaquin sent to his pharmacy (last creatinine was 2.0).  Also sent script for albuterol to his pharmacy.    Abel RNs, please inform patient.    Routing to pulm nodule clinic for awareness.

## 2024-01-26 NOTE — TELEPHONE ENCOUNTER
"Called and spoke with pt's Wife to triage.     Upon further assessment, the pt has been having breathing difficulty and a decreased appetite for the past month or more.  Leigha states that neither concern is worse, however neither have improved and she does not feel like either issue is being addressed enough.   She states, \"I just cannot handle him (the pt) anymore on my own.\"    Per chart review, pt has follow-up CT and an appt with Oncology on 2/7.     Routing to PCP to advise.   Any further guidance to offer at this time before pt sees Oncology on 2/7?  Would home care/care coordinator be appropriate at this time?    Thank you,  Gabriela Klein RN  "

## 2024-01-26 NOTE — PROGRESS NOTES
"Writer received a message from New Patient Scheduling:    From: BethnawafconchaSelene   Sent: 1/26/2024  12:53 PM CST   To: Cancer Care Nurse Navigation   Subject: appt interventional pul                          Pt wife called and stated pt is unable to breath, and he is not eating. I looked for sooner appt. I did  let her know I can have someone contact her to discuss this. I also let her know that if pt is having an emergency and unable to breath call 911.     Selene   ____________________________________________    Writer called Leigha (spouse) and spoke with her.     She reports Gavin is \"having a difficult time with his breathing.\" Writer asked if he is having labored breathing at rest and she reported he is not; He is comfortable at rest.     Writer asked how long this has been going on. She stated \"for a long time\" and could not pin point when it started. She could not estimate if it has been going on for days, weeks, or months. She reports he is not labored at rest but all he wants to do is sleep. She reports he does not walk more than to the bathroom and back to the bed or couch. He does not use a cane or walker but requires her assistance to get to the bathroom.     She reports he has been asking for his \"atomizer\" frequently when writer asked her to clarify whether she meant inhaler or nebs, she clarified inhaler. She reports he doesn't have the strength in his hands to do the inhaler himself. They do not own a home pulse oximeter to be able to check his O2 sat. The last time he left the house was when he had his CT scan on Jan 16th.     She also reports she is \"not eating.\" Last meal was approximately more than one week ago. He had some broth today but yesterday did not eat anything. The day before he had a quarter of a sandwich. He is drinking water though.     Writer encouraged Leigha to bring Gavin to the ED if he is having difficulty breathing and is not eating. Writer encouraged Leigha to call 911 " for an ambulance if she did not feel she could safely transport him to the ED.    Leigha reports she wants to move up his appt with Interventional Pulmonology and did not feel like she needed to take him to the ED at this time but reported she is aware that is an option. We were able to move up his 2/27 appt with Interventional Pulmonology to 2/7 as well as the CT scan.     Writer called the [facility] to update Dr. Eller's team as Gavin is not an established pt with IP yet. Writer gave report to triage RN, Roopa who reported she will update the [facility] team.     Reanna Rivas, LAKEISHAN, RN, OCN  [facility] Oncology Nurse Navigator  [phone] / [phone]

## 2024-01-26 NOTE — TELEPHONE ENCOUNTER
Reanna, oncology nurse navigator calling to report that she spoke with patient's wife who reports that patient is having a hard time breathing & is not eating very much.     Routing to Ox triage team to please call patient and wife at [phone] to triage breathing and eating concern - thank you!     (See patient outreach oncology encounter on 1/26/224 for more information from Reanna)    Ender Stephen RN  [facility]

## 2024-01-29 NOTE — TELEPHONE ENCOUNTER
Called and spoke with pt. Pt gave verbal consent to speak to his wife. Relayed Dr Eller's message from below.     Pts wife stated pt is not getting any relief from albuterol.     Is the plan still to try the prednisone?     Routing to PCP to review and advise.     Please call pt/pts wife back with PCPs recommendations.

## 2024-01-29 NOTE — TELEPHONE ENCOUNTER
I'm assuming (because it's not explicitly stated here), that when he is using the albuterol his symptoms improve, at least somewhat and at least temporarily.  Going to try a course of prednisone, in case he's having an exacerbation of reactive airway disease.  Give us update in next several days.    Prednisone script sent to his pharmacy.

## 2024-01-29 NOTE — TELEPHONE ENCOUNTER
Wife states that someone left a message for her to call back today.  States that the patient is using the albuterol inhaler every time he gets up to the bathroom . States that he uses it 4 times a night.   Huong Gustafson RN

## 2024-01-31 NOTE — TELEPHONE ENCOUNTER
GRACEI TO PCP    Pt's wife calling via CTC. States pt is not getting relief from albuterol medication and wondering what to do. Per chart review, PCP recommendation is to try prednisone, wife states they never picked it up. Writer relayed message below and wife verbalized understanding, states she will  prednisone and try that.    Writer reiterated that if pt is having increased trouble breathing or if it is emergency, wife needs to call 911 or bring pt into hospital. Wife reiterated that this is chronic problem and ok to try prednisone first.    AV MILLAN RN on 1/31/2024 at 10:41 AM'

## 2024-02-02 PROBLEM — I50.9 CONGESTIVE HEART FAILURE, UNSPECIFIED HF CHRONICITY, UNSPECIFIED HEART FAILURE TYPE (H): Status: ACTIVE | Noted: 2024-01-01

## 2024-02-02 PROBLEM — J18.9 MULTIFOCAL PNEUMONIA: Status: ACTIVE | Noted: 2024-01-01

## 2024-02-02 PROBLEM — I21.4 NSTEMI (NON-ST ELEVATED MYOCARDIAL INFARCTION) (H): Status: ACTIVE | Noted: 2024-01-01

## 2024-02-02 PROBLEM — J96.01 ACUTE RESPIRATORY FAILURE WITH HYPOXIA (H): Status: ACTIVE | Noted: 2024-01-01

## 2024-02-02 PROBLEM — I26.99 PULMONARY EMBOLISM, UNSPECIFIED CHRONICITY, UNSPECIFIED PULMONARY EMBOLISM TYPE, UNSPECIFIED WHETHER ACUTE COR PULMONALE PRESENT (H): Status: ACTIVE | Noted: 2024-01-01

## 2024-02-02 NOTE — PROGRESS NOTES
CLINICAL SWALLOW EVALUATION       02/02/24 1396   Appointment Info   Signing Clinician's Name / Credentials (SLP) Kirsty Richard M.A., CCC-SLP, BCS-S   General Information   Onset of Illness/Injury or Date of Surgery 02/01/24   Patient/Family Therapy Goal Statement (SLP) Patient not interested in oral intake, though agreeable.   Pertinent History of Current Problem Patient admitted 2/1 with bilateral PEs and SOB.  His PMH is significant for dementia and recent failure to thrive with refusal of oral intake, particular refusal of solids.  He reports generally decreased appetite, decreased energy for chewing with limited dentition coupled with current SOB. Also endorses food feels like it stacks in his chest at times if he eats more than a few bites. He is not particularly supportive of video swallow/esophagram or an EGD at this time, but agrees to try diet modifications and supplements.   Type of Evaluation   Type of Evaluation Swallow Evaluation   Oral Motor   Oral Musculature anomalies present   Structural Abnormalities none present   Facial Symmetry (Oral Motor)   Facial Symmetry (Oral Motor) WNL   Lip Function (Oral Motor)   Lip Range of Motion (Oral Motor) WNL   Cough/Swallow/Gag Reflex (Oral Motor)   Volitional Throat Clear/Cough (Oral Motor) WNL   Vocal Quality/Secretion Management (Oral Motor)   Vocal Quality (Oral Motor) dysphonic;breathy;hypophonic   Secretion Management (Oral Motor) WNL   General Swallowing Observations   Current Diet/Method of Nutritional Intake (General Swallowing Observations, NIS) NPO  (Held per care team, coughing with meds reported)   Swallowing Evaluation Clinical swallow evaluation   Clinical Swallow Evaluation   Feeding Assistance minimal assistance required   Additional evaluation(s) completed today Recommended   Rationale for completing additional evaluation Consider video swallow study and esophageal bolus screening with puree or solids if goals of care remain restorative    Clinical Swallow Evaluation Textures Trialed thin liquids;pureed;minced & moist   Clinical Swallow Eval: Thin Liquid Texture Trial   Mode of Presentation, Thin Liquids cup;straw;self-fed   Volume of Liquid or Food Presented 4 oz   Oral Phase of Swallow WFL   Pharyngeal Phase of Swallow intact   Diagnostic Statement SOB following consecutive drinking.  No overt Sx of aspiration with single sips.   Clinical Swallow Evaluation: Puree Solid Texture Trial   Mode of Presentation, Puree spoon;self-fed   Volume of Puree Presented 2 tsp   Oral Phase, Puree WFL   Pharyngeal Phase, Puree intact   Diagnostic Statement Patient reports too much food can start to feel stuck in his chest.   Clinical Swallow Eval: Minced & Moist   Mode of Presentation spoon;self-fed   Volume Presented 1 tsp   Oral Phase WFL   Diagnostic Statement Difficult mastication with bigger cracker crumbs, did not like taste, but able to swallow without overt Sx of aspiration and with adequate oral clearance.   Esophageal Phase of Swallow   Patient reports or presents with symptoms of esophageal dysphagia Yes   Esophageal sweep performed during today s vidofluoroscopic exam  No;Other (comments)   Esophageal comments Feeling of chest level retention with intake, denies regurgitation   Swallowing Recommendations   Diet Consistency Recommendations thin liquids (level 0);minced & moist (level 5)   Supervision Level for Intake close supervision needed   Mode of Delivery Recommendations food moistened;bolus size, small;slow rate of intake   Swallowing Maneuver Recommendations alternate food and liquid intake   Monitoring/Assistance Required (Eating/Swallowing) monitor for cough or change in vocal quality with intake;stop eating activities when fatigue is present   Recommended Feeding/Eating Techniques (Swallow Eval) maintain upright sitting position for eating;maintain upright posture during/after eating for 30 minutes;provide 6 smaller meals throughout day;set-up  and prepare tray   Medication Administration Recommendations, Swallowing (SLP) Whole or crushed meds with puree carrier recommended   Clinical Impression   Criteria for Skilled Therapeutic Interventions Met (SLP Eval) Yes, treatment indicated   SLP Diagnosis Moderate oral and pharyngoesophageal dysphagia   Risks & Benefits of therapy have been explained evaluation/treatment results reviewed;risks/benefits reviewed;current/potential barriers reviewed;participants included;caregiver   SLP Total Evaluation Time   Eval: oral/pharyngeal swallow function, clinical swallow Minutes (15686) 20   SLP Goals   Therapy Frequency (SLP Eval) 4 times/week   SLP Predicted Duration/Target Date for Goal Attainment 02/09/24   SLP Goals Swallow   SLP: Safely tolerate diet without signs/symptoms of aspiration Minced & moist diet;Thin liquids;With use of swallow precautions;With use of compensatory swallow strategies;With assistance/supervision   Interventions   Interventions Quick Adds Swallowing Dysfunction   Swallowing Dysfunction &/or Oral Function for Feeding   Treatment of Swallowing Dysfunction &/or Oral Function for Feeding Minutes (61899) 9   Symptoms Noted During/After Treatment Fatigue;Shortness of breath   Treatment Detail/Skilled Intervention Trained compensatory comfort swallow strategies, motility strategies.  Patient requiring only minimal cueing to implement strategies and written strategies posted in room.   SLP Discharge Planning   SLP Plan Meal follow up, pending GOC consider VFSS or esophagram   SLP Discharge Recommendation Transitional Care Facility   SLP Rationale for DC Rec Pending goals of care decisions, recommend consider SLP service follow up at next level of care for return to least restrictive textures and use of compensatory strategies for eating.   SLP Brief overview of current status  Clinical swallow evaluation completed and treatment initiated: recommend minced and moist diet with thin liquids.  Assist  for upright positioning, one bite/sip at a time, alternate textures, and remain upright 1-2 hours after meals.  Diet can be liberalized to meet patient/family needs if comfort care is the focus, though liquid diet or minced foods may be most comfortable.   Total Session Time   Total Session Time (sum of timed and untimed services) 29

## 2024-02-02 NOTE — CONSULTS
Care Management Initial Consult    General Information  Assessment completed with: Patient, Children, Spouse or significant other, Leigha Navarro  Type of CM/SW Visit: Initial Assessment    Primary Care Provider verified and updated as needed: Yes   Readmission within the last 30 days: no previous admission in last 30 days      Reason for Consult: end of life/hospice  Advance Care Planning: Advance Care Planning Reviewed: present on chart          Communication Assessment  Patient's communication style: spoken language (English or Bilingual)             Cognitive  Cognitive/Neuro/Behavioral: WDL                      Living Environment:   People in home: spouse, grandchild(conrad), child(conrad), adult  Aman, Melanie, Leigha, Grandson (17 yr.) and son-in-law  Current living Arrangements: house      Able to return to prior arrangements: yes  Living Arrangement Comments: return home with hospice    Family/Social Support:  Care provided by: spouse/significant other, child(conrad)  Provides care for: no one, unable/limited ability to care for self  Marital Status:   Wife, Children  Leigha       Description of Support System: Supportive, Involved    Support Assessment: Adequate family and caregiver support, Adequate social supports    Current Resources:   Patient receiving home care services: No     Community Resources: None  Equipment currently used at home:    Supplies currently used at home: None    Employment/Financial:  Employment Status: retired        Financial Concerns: none   Referral to Financial Worker: No  Finance Comments: [employer]    Does the patient's insurance plan have a 3 day qualifying hospital stay waiver?  No    Lifestyle & Psychosocial Needs:  Social Determinants of Health     Food Insecurity: Not on file   Depression: Not at risk (8/9/2023)    PHQ-2     PHQ-2 Score: 0   Housing Stability: Not on file   Tobacco Use: Medium Risk (12/20/2023)    Patient History     Smoking Tobacco Use: Former      "Smokeless Tobacco Use: Never     Passive Exposure: Not on file   Financial Resource Strain: Not on file   Alcohol Use: Not on file   Transportation Needs: Not on file   Physical Activity: Not on file   Interpersonal Safety: Not on file   Stress: Not on file   Social Connections: Not on file       Functional Status:  Prior to admission patient needed assistance:   Dependent ADLs:: Bathing, Dressing, Eating, Incontinence  Dependent IADLs:: Cleaning, Cooking, Laundry, Shopping, Meal Preparation, Medication Management, Money Management, Transportation, Incontinence  Assesssment of Functional Status: At functional baseline    Mental Health Status:  Mental Health Status: No Current Concerns       Chemical Dependency Status:  Chemical Dependency Status: No Current Concerns             Values/Beliefs:  Spiritual, Cultural Beliefs, Mormonism Practices, Values that affect care: no               Additional Information:  Per care management for end of life chart was reviewed. Patient is a very pleasant 78 year old male that was admitted on 2/1/24. Per H & P \"history of AFIB and hypertension who presents to the ED with his wife for evaluation of shortness of breath\". Writer updated from  that family wants hospice and want to go home with this service.    Writer met with patient, spouse and daughter (Melanie) at bedside. Writer introduced self and role. Writer explained hospice is an agency that is covered under medicare with a qualifying diagnosis of 6 months or less. Writer explained hospice is a service and not 24/7 care. Hospice team comes out roughly 2 times a week for about 45-60 minute visits. Writer explained what hospice services are consists of (RN, HHA, [place], SW, alternative therapies and Physician).  Writer explained hospice covered comfort medication and equipment.  Writer shared where hospice can take place and private pay costs associated. Family confirmed goal is for patient to return home with hospice. " Melanie stated that patient and spouse live with her and her  and 17 yr old son. Patient will have 24/7 care provided by family. The house has 2 stairs to enter. Patient's spouse confirmed address. Writer also spoke with Johny (son in law) via phone with family present and reviewed coverage information, hospice support and goals. Writer discussed any equipment needs and family declined needing a hospital bed at this time and just asked that we send patient with a urinal would like incontinence products from hospice agency. Family aware that if more equipment is needed down the line, hospice agency will order it. Writer offered choice on agency and family elected [facility]. Writer stated she would send a referral for hospice and determine if they can meet patient's needs. Family hoping for a discharge on Sunday if possible. Writer stated she will see what hospice capacity is.Writer offered private pay resources to family incase they need extra assistance down the line. Writer confirmed with RN that patient does not Need O2 and was weaned to room air.      Referral sent to [facility] via DOD. Call received from Jett stating he was not sure if they would have capacity over the weekend and stated that weekend  can call him on Saturday to determine capacity. MD updated on hospice referral being made.        MARCY Boudreaux, DANIEL   Social Work   [facility]

## 2024-02-02 NOTE — PROGRESS NOTES
[facility]    Medicine Progress Note - Hospitalist Service    Date of Admission:  2/1/2024    Interval History   Multiple discussions with the family today including his wife and daughter regarding his overall quality of life and how he has been doing.  Reviewed his last several clinical visits and evaluations.  Discussion regarding his overall decline.  Ultimately deciding to transition into hospice.    Assessment & Plan   Gavin House is a 78 year old male with a history of dementia, failure to thrive, BPH, PAF, WCT, CVA, alcohol use, admitted on 2/1/2024. He presents to the emergency department for evaluation of worsening shortness of breath over the past days, but really over the past several months as well.  Ongoing weight loss since approximately November.     Advanced care planning:   FTT   Dementia (alzheimers)   Called family today including daughter and wife.  Patient has been in failure to thrive mode with ongoing weight loss, not wanting to eat.  Based on epic chart review had at least cognitive impairment back to 2021 with a Low SLUMS score  Reviewed current findings this admission and treatments.  He has been declining.  Discussed risks and benefits of doing any further diagnostic testing including evaluation for possible dysphagia.  Discussion with the family that even if after diagnostic testing he was found to have some malignancy or underlying condition that would require surgery, follow-up visits and assessments would the family want to pursue this given his inability to really engage in his treatments and prolong his life in the setting of advancing dementia.  Daughter appeared to be relieved feeling potentially they have been trying to find a reason for his failure to thrive but likely has a strong component of underlying advancing dementia  Discussion regarding potential swallow studies, video swallows or assessments of his ability to eat or swallow.  Again discussed  whether the patient would want to have his life extended and the current quality of life that he has.  After going through options of more restorative cares including testing potential swallow studies endoscopies and ways to extend his life or determine diagnosis the family wanted to pursue comfort.   During his time in the hospital he is already pulled out 4 IV, confused.   Has been difficult for him to get out of bed at home.  His wife is trying to do sink type baths as the patient has not been able to even get in the bathtub or shower with his overall frailty.  She is most tearful about the fact that he just will not eat: Explained this can be part of the end stages of dementia. (Underlying pathology not excluded however family not needing to know or pursue aggressive diagnostic studies)   Care management consultation requested for hospice evaluation and enrollment.    Family is interested in home hospice.  Qualifying diagnoses would be dementia with weight loss and severe protein calorie malnutrition with failure to thrive as well as acute hypoxic respiratory failure, multifactorial in nature.  Patient is DNR/DNI  He lives with his wife and they both live with the daughter and her family.  She has been trying to do all of his cares.      Unstageable coccygeal ulceration  Wound nurse consulted for unstageable coccygeal ulceration present on admission (not open, but darkened tissue overlying coccyx/low sacrum).    Major neurocognitive disorder, suspect Alzheimer's dementia:   Per assessment with Dr. Haney In 2021, patient with 13 out of 30 on slums in the setting of noted memory impairment.  Underwent brain MRI with some atrophy and small vessel disease as well as neurology follow-up.  Neuropsychiatric testing was recommended, but patient decline to follow-up for this at that time (per wife).  Has had ongoing memory impairment, and here tonight, he initially cannot recall the name of his daughter, cannot recall the  "name of his son (questions if it might be Mehran, who is his wife's brother).  He initially tells me the year is 1945, the year of his birth.  When asked again after being told that is wrong, he guesses that it might be 1959.  He thinks the month might be April, and when I tell him that it is February and ask what season it is, he suggests the season is January.  Will occasionally use humor to answer in generalities and avoid directly answering questions (how old are you?  I feel like I am 89).  I suspect that a major contributor to patient's ongoing decline is his underlying dementia which may be resulting in dysphagia and poor intake with weight loss and pulmonary infiltrates.  He cannot recall when he ate or if he ate, but tells me he believes he ate today when his wife at bedside tells me this is not the case.  Daughter expressed some thoughts of guilt  that she should have done something about his dementia earlier.  Explained this is a progressive disease and the treatments are not great.  He has progressed in his dementia over time that would have happened.  He is initial score in 2021 of 13/30 already noted significant impairment  Emphasized to the daughter that she has been supportive and that she has taken her parents in and has been caring for them in her own home. (Living with her) \"promised her parents they would not need to go to a NH)\"      Suspect acute kidney injury:  Bladder scan, straight catheterize as needed.   If patient is requiring straight catheterization for urinary retention, please page for order   Stop diuretic      Atrial fibrillation, paroxysmal:   [id number] score of 5.    On Eliquis prior to admission: STOP   Stop amiodarone  Will continue on metoprolol.    Acute respiratory failure with hypoxia, multifactorial: Contributions from restrictive lung disease with scoliosis and 56% vital capacity on prior pulmonary function tests, what appears to be chronic aspiration and multifocal " pneumonia, bilateral pulmonary emboli, bilateral pleural effusions,  Multifocal pneumonia: Patient with scattered infiltrates, changing over time on CT scan with resolution of inferior left upper lobe opacity after recent completion of levofloxacin 1/17 - 1/27/2024.  Worsening predominantly right-sided basilar pneumonia highly suggestive of aspiration.  Initially started on ceftriaxone and Zithromax on admission: stopped   Discussion with the family at length.  Included and goals of care was discussion regarding antibiotics.  Daughter and wife do not want to continue antibiotics.  He had scheduled pulmonary assessments in the future but again given his global decline discussion about quality of life and actually pursuing any further workups. Family does not wish to pursue      Non-ST elevation myocardial infarction:   Troponin elevated at 119.  Similar elevation during hospitalization in June attributed to demand ischemia.    Suspect demand ischemia in the setting of systemic hypoxia.    Very minimal coronary artery disease on angiogram June 2023.  Stop heparin with transition to hospice.  He is now torn out 4 IV      Diet: Combination Diet Minced and Moist Diet (level 5); Thin Liquids (level 0)  Snacks/Supplements Adult: Ensure Enlive; Between Meals  Snacks/Supplements Adult: Gelatein Plus; With Meals  Room Service    DVT Prophylaxis: none with transition to hospice   Moore Catheter: Not present  Lines: None     Cardiac Monitoring: None  Code Status: No CPR- Do NOT Intubate      Clinically Significant Risk Factors Present on Admission        # Hyperkalemia: Highest K = 5.4 mmol/L in last 2 days, will monitor as appropriate       # Hypoalbuminemia: Lowest albumin = 3 g/dL at 2/1/2024  9:28 PM, will monitor as appropriate  # Drug Induced Coagulation Defect: home medication list includes an anticoagulant medication    # Hypertension: Noted on problem list  # Chronic heart failure with preserved ejection fraction:  heart failure noted on problem list and last echo with EF >50%         # Financial/Environmental Concerns: none         Disposition Plan      Expected Discharge Date: 02/04/2024      Destination: home with help/services;home with family              SIOBHAN VELASCO MD  Hospitalist Service  [facility]  Securely message with [employer] (more info)  Text page via [employer] Paging/Directory   ______________________________________________________________________    Physical Exam   Vital Signs: Temp: 98  F (36.7  C) Temp src: Axillary BP: 112/64 Pulse: 71   Resp: 18 SpO2: 90 % O2 Device: Nasal cannula Oxygen Delivery: 4 LPM  Weight: 101 lbs 3.06 oz    General Appearance: Patient extremely frail and cachectic   Poor dentition   Confused. Thought he was in a school.   Respiratory: Coarse rhonchi without wheezes   Cardiovascular: regular rate with no gallop or rub  GI: + BS, soft, non tender  Skin: Bruises       Medical Decision Making       50 MINUTES SPENT BY ME on the date of service doing chart review, history, exam, documentation & further activities per the note.      Data     I have personally reviewed the following data over the past 24 hrs:    8.8  \   12.0 (L)   / 259     136 97 (L) 17.9 /  72   4.1 29 0.87 \     ALT: N/A AST: N/A AP: 86 TBILI: 1.4 (H)   ALB: 3.0 (L) TOT PROTEIN: 7.2 LIPASE: N/A     Trop: 119 (HH) BNP: 2,785 (H)     Procal: 0.19 CRP: N/A Lactic Acid: 1.9         Imaging results reviewed over the past 24 hrs:   Recent Results (from the past 24 hour(s))   CT Chest PE Abdomen Pelvis w Contrast    Narrative    EXAM: CT CHEST PE ABDOMEN PELVIS W CONTRAST  LOCATION: [facility]  DATE: 2/1/2024    INDICATION: Shortness of breath, hypoxia, cachexia, RLQ abdominal pain.  COMPARISON: 01/16/2024.  TECHNIQUE: CT chest pulmonary angiogram and routine CT abdomen pelvis with IV contrast. Arterial phase through the chest and venous phase through the abdomen and pelvis.  Multiplanar reformats and MIP reconstructions were performed. Dose reduction   techniques were used.   CONTRAST: 44 mL Isovue   370    FINDINGS:  ANGIOGRAM CHEST: There are a few small segmental emboli within both lower lobes. No evidence for right heart strain. Thoracic aorta is negative for aneurysm.    LUNGS AND PLEURA: Stable bilateral pleural effusions with stable subpleural consolidation lingula and left lower lobe. Left pleural calcification. Worsening infiltrate throughout much of the right lung. Again seen are peripheral groundglass infiltrates   in the right upper and middle lobe with resolution of a previously seen groundglass infiltrate within the inferior left upper lobe.    MEDIASTINUM/AXILLAE: Calcified nodes.    CORONARY ARTERY CALCIFICATION: Mild.    HEPATOBILIARY: Normal.    PANCREAS: Normal.    SPLEEN: A few small splenic infarcts. Calcified granulomata in the spleen.    ADRENAL GLANDS: Normal.    KIDNEYS/BLADDER: Tiny nonobstructing stone within the right kidney.    BOWEL: Colonic diverticula without CT evidence for diverticulitis. No appendicitis.    LYMPH NODES: Normal.    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    PELVIC ORGANS: Prostatic enlargement.    MUSCULOSKELETAL: Scoliosis with lumbar degenerative change.      Impression    IMPRESSION:  1.  A few small acute pulmonary emboli in segmental branches in the lower lobes.    2.  A few small peripheral splenic infarcts.    3.  No appendicitis.    4.  Colonic diverticula without CT evidence for diverticulitis.    5.  Bilateral pleural effusions. Worsening infiltrate within the right lower lobe with stable subpleural regions of consolidation and lingula and left lower lobe. Peripheral groundglass infiltrates in the right upper and middle lobes little change with   resolution of a previously seen subpleural infiltrate within the medial aspect of the left upper lobe.    6.  Results given to Dr. Alex Templeton.

## 2024-02-02 NOTE — H&P
[facility]    History and Physical - Hospitalist Service       Date of Admission:  2/1/2024    Assessment & Plan      Gavin House is a 78 year old male admitted on 2/1/2024. He presents to the emergency department for evaluation of worsening shortness of breath over the past days, but really over the past several months as well.  Ongoing weight loss since approximately November.    Advanced care planning: Patient is very clearly failing to thrive with ongoing weight loss and described refusal to eat solid foods at home.  His dementia precludes any narrative history of why this is the case, and he does not actually recognize that he has been refusing solid foods at home as he cannot remember this is the case.  Cachectic, frail, weak to the point where it is difficult for him to get out of bed and is really only occasionally moving from the bed to the couch at home with his wife.  Some waxing and waning pulmonary infiltrates concerning for ongoing aspiration now with hypoxia in the setting of medication adherence issues.   -Speech pathology consulted for dysphagia.  Anticipate video swallow study.  If structural etiology for dysphagia identified, family would be interested in potentially pursuing this (for example, EGD) to improve patient's ability to tolerate oral intake.  As such, patient will remain on heparin for now prior to transitioning back to reduced dose DOAC  -Care management consultation requested for hospice evaluation and enrollment.  Family is interested in home hospice.  Qualifying diagnoses would be dementia with weight loss and severe protein calorie malnutrition with failure to thrive as well as acute hypoxic respiratory failure, multifactorial in nature.  -Physical therapy has not been consulted per my discussion with patient and family.  They are fairly certain that patient will be enrolling in hospice.  This could be reconsidered if, as above, structural etiology for  poor intake is identified.  -Will obtain cystatin C, BMP in a.m. to assist in clarifying Eliquis dosing.  Prior creatinine of 2.0 in December and now 0.88.  May be secondary to substantial muscular wasting with an overestimated GFR at this point, but could also potentially be lab error on admission  -Wound nurse consulted for unstageable coccygeal ulceration present on admission (not open, but darkened tissue overlying coccyx/low sacrum).  -Following speech pathology consultation and results, anticipate comfort care order set can be initiated    Major neurocognitive disorder, suspect Alzheimer's dementia: In 2021, patient with 13 out of 30 on slums in the setting of noted memory impairment.  Underwent brain MRI with some atrophy and small vessel disease as well as neurology follow-up.  Neuropsychiatric testing was recommended, but patient decline to follow-up for this at that time (per wife).  Has had ongoing memory impairment, and here tonight, he initially cannot recall the name of his daughter, cannot recall the name of his son (questions if it might be Mehran, who is his wife's brother).  He initially tells me the year is 1945, the year of his birth.  When asked again after being told that is wrong, he guesses that it might be 1959.  He thinks the month might be April, and when I tell him that it is February and ask what season it is, he suggests the season is January.  Will occasionally use humor to answer in generalities and avoid directly answering questions (how old are you?  I feel like I am 89).  I suspect that a major contributor to patient's ongoing decline is his underlying dementia which may be resulting in dysphagia and poor intake with weight loss and pulmonary infiltrates.  He cannot recall when he ate or if he ate, but tells me he believes he ate today when his wife at bedside tells me this is not the case.  -If patient opts not to enroll in hospice and pursues more aggressive and restorative cares,  could consider completion of neurocognitive assessment as previously recommended.  Unlikely to  at this time, however, as this appears to be progression of previous concern for dementia.    Suspect acute kidney injury:  -Bladder scan, straight catheterize as needed.  If patient is requiring straight catheterization for urinary retention, please page for order as we may consider Moore catheter given likely hospice enrollment.  -Continue Flomax 0.4 mg daily  -Holding prior to admission chlorthalidone    Atrial fibrillation, paroxysmal: [id number] score of 5.  Recent dose reduction of Eliquis from 5 mg twice daily to 2.5 mg twice daily with ANANTH and weight loss as of December 21.  Per wife, this medication was not available at the pharmacy when they attempted to pick it up so he has been off of anticoagulation for 2 weeks or so (now w PEs)  -Continue amiodarone 200 mg 6 days/week  -Continue metoprolol tartrate 12.5 mg twice daily  -Continue heparin for now, transition back to reduced dose DOAC pending GFR and speech pathology evaluation as above    Acute respiratory failure with hypoxia, multifactorial: Contributions from restrictive lung disease with scoliosis and 56% vital capacity on prior pulmonary function tests, what appears to be chronic aspiration and multifocal pneumonia, bilateral pulmonary emboli, bilateral pleural effusions,  Multifocal pneumonia: Patient with scattered infiltrates, changing over time on CT scan with resolution of inferior left upper lobe opacity after recent completion of levofloxacin 1/17 - 1/27/2024.  Worsening predominantly right-sided basilar pneumonia highly suggestive of aspiration.  -Ceftriaxone and azithromycin ordered  -Speech pathology consult as above  -There had been a plan to meet with Dr. Jamison of pulmonology in the coming weeks, likely to discuss possible bronchoscopy.  Not pursuing consulted pulmonary at this time as family anticipates they will pursue home  "hospice.  Could be revisited pending speech therapy assessment and any evidence of patient recovery in the coming weeks.  Family in agreement that pulmonary consultation not needed at this time  -Not pursuing thoracentesis for small volume pleural effusions.  Patient may not necessarily have capacity for this decision, but generally does not want to pursue thoracentesis as it \"could make me very sick\" in the general thought is that patient will be transitioning to hospice in the next day or 2.  Did not fully assess capacity, but he certainly could have a complication from thoracentesis as he has had a history of pneumothorax in the past and pleural effusions are small.  -Oximetry, oxygen as needed  -Holding on diuretic therapies at this time as I anticipate he has significant kidney injury with poor intake  -Sputum culture and Gram stain if able to obtain    Non-ST elevation myocardial infarction: Troponin elevated at 119.  Similar elevation during hospitalization in June attributed to demand ischemia.  Suspect demand ischemia in the setting of systemic hypoxia.  Very minimal coronary artery disease on angiogram June 2023.  -Heparin infusion as above  -Not trending troponin further given recent coronary angiogram, likely transition to hospice  -Correction of systemic hypoxia with oxygen          Diet:  Mechanical soft diet with thin liquids for now.  Speech pathology consulted  DVT Prophylaxis: Heparin drip currently.  Plan to transition back to reduced dose Eliquis pending repeat creatinine  in a.m. and speech pathology evaluation.  If patient with structural etiology for dysphagia and poor intake, family would be interested in potentially pursuing this (esophageal stricture, for instance), so will keep on heparin rather than switching to Eliquis immediately on admission.  Moore Catheter: Not present  Lines: None     Cardiac Monitoring: None  Code Status:  DNR/DNI.  Confirmed with patient and family on " admission.    Clinically Significant Risk Factors Present on Admission        # Hyperkalemia: Highest K = 5.4 mmol/L in last 2 days, will monitor as appropriate.  Suspect may be artifactual given hemolyzed specimen       # Hypoalbuminemia: Lowest albumin = 3 g/dL at 2/1/2024  9:28 PM, will monitor as appropriate  # Drug Induced Coagulation Defect: home medication list includes an anticoagulant medication    # Hypertension: Noted on problem list                 Disposition Plan    anticipate 2/3/2024 to home on home hospice pending speech pathology evaluation, hospice enrollment.  Will likely benefit from hospital bed at discharge, but could potentially be arranged for delivery after discharge       Bo Haney MD  Hospitalist Service  [facility]  Securely message with [employer] (more info)  Text page via [facility] Paging/Directory     ______________________________________________________________________    Chief Complaint   Patient recalls that he was having shortness of breath when reminded, otherwise does not have complaints.  Family concerned with shortness of breath and weight loss, confusion    History is obtained from the patient, daughter and wife at bedside, chart review, discussion with Dr. Templeton in the emergency department.  Patient, unfortunately, is unable to provide any reliable history in the setting of his dementia.  He cannot recall that he has not eaten solid food in approximately 1 month, cannot reliably describe prior symptoms.    History of Present Illness   Gavin House is a 78 year old male who is brought to the emergency department with wife and daughter in the setting of shortness of breath, cough with mucus, weight loss, confusion.  He is found to be hypoxic with bilateral pulmonary emboli, pleural effusions, waxing and waning pulmonary infiltrates.    For the past several years, it appears patient has weight approximately 105 pounds.  Was thought to have some  "mild dementia with forgetfulness where he underwent some workup in the summer and fall 2021.  Did not follow-up with neuropsychiatric testing at that time as far as I can tell from chart review, and wife tells me that he did not want to go.  He did follow with occupational therapy for MoCA assessment and memory strategies.    Patient began to lose weight over the past 2 months or so as far as I am able to gather from primarily patient's wife at bedside.  When he was seen by his primary care provider at the end of December, he weighed 95 pounds, and has continued to lose weight.  Wife is not able to tell me exactly when this occurred, but tells me that he stopped eating solid foods.  She believes this was relatively abrupt.  Previously he would eat a sandwich in the morning, in the evening would have a hotdog and some soup, but subsequently stopped eating solid foods.  She does not recall any coughing or choking or overt aspiration, does not describe patient having food stuck in his mouth.  She tells me that he will put something in his mouth and chew it, but subsequently spit it out, sometimes this will come with phlegm.  He apparently does not recall doing this.  She tells me that when he spits it out, he will make an excuse like \"the dogs got it.\"  He will drink thin liquids without difficulty.  Wife tells me he drinks a lot of water, and will drink soup broth, but not eat noodles out of soup.  Could potentially have a stricture versus dysphagia associated with cognitive impairment.  There is no complaint of pain when patient is eating, no vomiting.    Patient with pulmonary infiltrates which appear to have waxed and waned.  Recently completed a course of levofloxacin with some improvement in left upper/middle lobe infiltrate and now worsening right lower lobe infiltrate.  Exam is consistent with right lower lobe pneumonia, suspect secondary to aspiration events which may be ongoing.  Discussed my concern of this " with patient and family at bedside.    Patient is cachectic, had lost 10 pounds over the course of December, and has continued to lose weight.  He has a pressure ulceration and significant dementia.  He cannot recall his children's names readily, and he was never actually able to tell me his son's name.  He cannot tell me the season even when I informed him of the month, only guesses that the year might be 1960.  He has gotten progressively weaker over the past 1 to 2 months to the point where he remains in bed until the afternoon, and only with urging from his wife will he get up to go to the couch.  He tells me that he was too weak to sit up and feels unwell if he does.  Discussed patient's decline as well as significant memory impairment which appears to be progression from prior dementia concern.  I also discussed that I am worried that patient's failure to thrive is largely related to his progressive cognitive impairment and aspiration risk, and though acute issues might be resolved, cognitive issues are unlikely to relent.  Multiple times he has asked his family if he is able to go home yet.  He cannot recall why he is here in the emergency department.    In the setting of his failure to thrive, discussed how we should approach his plan of care.  Certainly there are interventions and diagnostic procedures which could be pursued, but uncertain as to end benefit of these.  Discussed, for instance, speech pathology evaluation, thoracentesis, possible bronchoscopy as he is scheduled to see interventional pulmonology in the coming weeks.  In the end, based on our discussion, family believes that they will be transitioning to hospice at home.  They are interested in a speech pathology evaluation to evaluate for any potential structural etiology for his inability to tolerate solid foods in the hopes that he might be able to eat and gain some weight if there is a readily reversible cause of this.  They are aware that  if his dysphagia is related to dementia or not mechanical in nature, it is unlikely that he will benefit from ongoing speech pathology with his degree of cognitive impairment.  Patient made DNR/DNI.    In terms of his acute issues for [place] hospitalization was requested, he is hypoxic requiring 4 L of nasal cannula oxygen.  Hypoxia is multifactorial with bilateral pulmonary embolisms, pulmonary infiltrates, pleural effusions, chronic scoliosis.  Has been using inhalers at home frequently, so there is some concern for medication mismanagement here.  Was described as using 6 full inhalers in 6 hours, but unlikely that he had 6 full inhalers available.  Recently an issue with obtaining refill of his Eliquis.  Has been off of his anticoagulant for atrial fibrillation for the past 2 weeks or so by wife's estimate.  He recently had a dose reduction to 2.5 mg twice daily given his weight loss and renal insufficiency as of December 21 through his cardiology clinic.  It appears a prescription was sent to [facility], but his wife tells me that when she went to fill this, she was unable to do so as the prescription had been canceled.  Pharmacy did not have a record of this.  Note that daughter makes mention that patient's wife, her mother, is also undergoing evaluation for memory impairment.      Past Medical History    Past Medical History:   Diagnosis Date    Alcohol use     Allergic rhinitis, cause unspecified     Diverticulosis of colon     Diverticulitis 5/2010    HTN (hypertension)     Memory loss     Mild intermittent asthma     Paroxysmal atrial fibrillation (H) 05/09/2016    Pneumothorax     PVC (premature ventricular contraction)     intermittent bigeminy    Rosacea     Scoliosis (and kyphoscoliosis), idiopathic     Stroke (H)        Past Surgical History   Past Surgical History:   Procedure Laterality Date    CV CORONARY ANGIOGRAM N/A 6/29/2023    Procedure: Coronary Angiogram;  Surgeon: Jennifer Daniel MD;   Location:  HEART CARDIAC CATH LAB    [facility] NONSPECIFIC PROCEDURE  1974    right inguinal herniorraphy    [facility] NONSPECIFIC PROCEDURE  age 16    L inguinal herniorraphy       Prior to Admission Medications   Prior to Admission Medications   Prescriptions Last Dose Informant Patient Reported? Taking?   Ca Carbonate-Mag Hydroxide (ROLAIDS PO) Unknown at [facility], rare use Spouse/Significant Other Yes Yes   Sig: Take 1 tablet by mouth daily as needed   albuterol (PROAIR HFA/PROVENTIL HFA/VENTOLIN HFA) 108 (90 Base) MCG/ACT inhaler 2/1/2024 at [facility], regularly uses Spouse/Significant Other No Yes   Sig: Inhale 2 puffs into the lungs every 6 hours as needed for shortness of breath, wheezing or cough   amiodarone (PACERONE) 200 MG tablet 2/1/2024 at unknown time Spouse/Significant Other No Yes   Sig: Take 1 tab 6 days/week (skip on Sundays)   apixaban ANTICOAGULANT (ELIQUIS ANTICOAGULANT) 2.5 MG tablet Past Month at unknown time Spouse/Significant Other No Yes   Sig: Take 1 tablet (2.5 mg) by mouth 2 times daily   atorvastatin (LIPITOR) 20 MG tablet 2/1/2024 at pm Spouse/Significant Other No Yes   Sig: Take 1 tablet (20 mg) by mouth daily   chlorthalidone (HYGROTON) 25 MG tablet Unknown at unknown time Spouse/Significant Other Yes Yes   Sig: Take 25 mg by mouth daily   metoprolol tartrate (LOPRESSOR) 25 MG tablet 2/1/2024 at X2 doses Spouse/Significant Other No Yes   Sig: Take 0.5 tablets (12.5 mg) by mouth 2 times daily   potassium chloride ER (KLOR-CON M) 10 MEQ CR tablet 2/1/2024 at pm Spouse/Significant Other No Yes   Sig: Take 1 tablet (10 mEq) by mouth daily   predniSONE (DELTASONE) 20 MG tablet 2/1/2024 at pm Spouse/Significant Other No Yes   Sig: Take 1 tablet (20 mg) by mouth daily for 7 days   tamsulosin (FLOMAX) 0.4 MG capsule 2/1/2024 at pm Spouse/Significant Other No Yes   Sig: Take 1 capsule (0.4 mg) by mouth daily      Facility-Administered Medications: None           Physical Exam   Vital Signs: Temp: 97.3  F (36.3   C) Temp src: Temporal BP: 111/73 Pulse: 80   Resp: 19 SpO2: 95 % O2 Device: Nasal cannula Oxygen Delivery: 4 LPM  Weight: 85 lbs 0 oz    General Appearance: Cachectic, chronically unwell appearing 78-year-old male.  Appears older than stated age.  Eyes: No scleral icterus or injection  HEENT: Atraumatic.  Poor dentition with several broken teeth and thick plaque/coating  Respiratory: Some scattered wheezing and rhonchi.  Right basilar crackles on inspiration are more localized and concerning for infiltrate/infection.  Occasional rattling cough without thick sputum production  Cardiovascular: Regular rate and rhythm  GI: Abdomen thin with no palpable mass.  Some mild diffuse tenderness to palpation most notable in left lower quadrant.  Lymph/Hematologic: No lower extremity edema  Skin: Approximately 1.5-2 cm diameter area concerning for deep tissue pressure injury over low sacrum/coccyx  Musculoskeletal: Near complete subcutaneous fat loss with diffuse muscular wasting.  Cachectic.  Neurologic: Alert and conversant, pleasantly confused.  Oriented only to self.  Recognizes his wife, but cannot recall his daughter's name at bedside without assistance  Psychiatric: Very pleasant, normal affect    Medical Decision Making       115 MINUTES SPENT BY ME on the date of service doing chart review, history, exam, documentation & further activities per the note.  Majority of this face to face encounter, 50 minutes or more, spent in advanced care planning discussions with patient and family at bedside over several visits     Data     I have personally reviewed the following data over the past 24 hrs:    8.8  \   12.0 (L)   / 259     134 (L) 93 (L) 18.0 /  114 (H)   5.4 (H) 28 0.88 \     ALT: N/A AST: N/A AP: 86 TBILI: 1.4 (H)   ALB: 3.0 (L) TOT PROTEIN: 7.2 LIPASE: N/A     Trop: 119 (HH) BNP: 2,785 (H)     Procal: 0.19 CRP: N/A Lactic Acid: 1.9         Imaging results reviewed over the past 24 hrs:   Recent Results (from the  past 24 hour(s))   CT Chest PE Abdomen Pelvis w Contrast    Narrative    EXAM: CT CHEST PE ABDOMEN PELVIS W CONTRAST  LOCATION: [facility]  DATE: 2/1/2024    INDICATION: Shortness of breath, hypoxia, cachexia, RLQ abdominal pain.  COMPARISON: 01/16/2024.  TECHNIQUE: CT chest pulmonary angiogram and routine CT abdomen pelvis with IV contrast. Arterial phase through the chest and venous phase through the abdomen and pelvis. Multiplanar reformats and MIP reconstructions were performed. Dose reduction   techniques were used.   CONTRAST: 44 mL Isovue   370    FINDINGS:  ANGIOGRAM CHEST: There are a few small segmental emboli within both lower lobes. No evidence for right heart strain. Thoracic aorta is negative for aneurysm.    LUNGS AND PLEURA: Stable bilateral pleural effusions with stable subpleural consolidation lingula and left lower lobe. Left pleural calcification. Worsening infiltrate throughout much of the right lung. Again seen are peripheral groundglass infiltrates   in the right upper and middle lobe with resolution of a previously seen groundglass infiltrate within the inferior left upper lobe.    MEDIASTINUM/AXILLAE: Calcified nodes.    CORONARY ARTERY CALCIFICATION: Mild.    HEPATOBILIARY: Normal.    PANCREAS: Normal.    SPLEEN: A few small splenic infarcts. Calcified granulomata in the spleen.    ADRENAL GLANDS: Normal.    KIDNEYS/BLADDER: Tiny nonobstructing stone within the right kidney.    BOWEL: Colonic diverticula without CT evidence for diverticulitis. No appendicitis.    LYMPH NODES: Normal.    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    PELVIC ORGANS: Prostatic enlargement.    MUSCULOSKELETAL: Scoliosis with lumbar degenerative change.      Impression    IMPRESSION:  1.  A few small acute pulmonary emboli in segmental branches in the lower lobes.    2.  A few small peripheral splenic infarcts.    3.  No appendicitis.    4.  Colonic diverticula  without CT evidence for diverticulitis.    5.  Bilateral pleural effusions. Worsening infiltrate within the right lower lobe with stable subpleural regions of consolidation and lingula and left lower lobe. Peripheral groundglass infiltrates in the right upper and middle lobes little change with   resolution of a previously seen subpleural infiltrate within the medial aspect of the left upper lobe.    6.  Results given to Dr. Alex Templeton.

## 2024-02-02 NOTE — PHARMACY-ADMISSION MEDICATION HISTORY
"Pharmacist Admission Medication History    Admission medication history is complete. The information provided in this note is only as accurate as the sources available at the time of the update.    Information Source(s): Patient, Family member, and CareEverywhere/SureScripts via in-person    Pertinent Information: unclear if patient taking chlorthalidone and apixaban - family may have confused which was on hold. Family did know lisinopril was held. Levofloxacin filled 1/18/24 #10ds - family confirm course complete.   Patient has been vomiting for last couple of days, unclear how much of last few doses patient has kept down overall.     Changes made to PTA medication list:  Added: chlorthalidone (see note, unclear if patient should be taking, but family thinks he has been taking)   Deleted: epinephrine inhaler PRN (family unfamiliar with this)   Changed: None    Allergies reviewed with patient and updates made in EHR: no - reviewed by nursing    Medication History Completed By: Mari Grant [facility] 2/1/2024 10:59 PM    PTA Med List   Medication Sig Note Last Dose    albuterol (PROAIR HFA/PROVENTIL HFA/VENTOLIN HFA) 108 (90 Base) MCG/ACT inhaler Inhale 2 puffs into the lungs every 6 hours as needed for shortness of breath, wheezing or cough  2/1/2024 at PRN, regularly uses    amiodarone (PACERONE) 200 MG tablet Take 1 tab 6 days/week (skip on Sundays)  2/1/2024 at unknown time    apixaban ANTICOAGULANT (ELIQUIS ANTICOAGULANT) 2.5 MG tablet Take 1 tablet (2.5 mg) by mouth 2 times daily 2/1/2024: Family at bedside said this was \"recalled from his doctor\" - chart notes indicate dose decrease to 2.5 mg 12/2023 but cannot find record of holding/discontinuing Past Month at unknown time    atorvastatin (LIPITOR) 20 MG tablet Take 1 tablet (20 mg) by mouth daily  2/1/2024 at pm    Ca Carbonate-Mag Hydroxide (ROLAIDS PO) Take 1 tablet by mouth daily as needed  Unknown at PRN, rare use    chlorthalidone (HYGROTON) 25 MG " tablet Take 25 mg by mouth daily 2/1/2024: Family at bedside thinks patient taking. Last filled 10/20/23 #90ds #90, pended order in chart 1/16/24. Chart note 12/21/23 indicates this was held along with lisinopril.  Unknown at unknown time    metoprolol tartrate (LOPRESSOR) 25 MG tablet Take 0.5 tablets (12.5 mg) by mouth 2 times daily  2/1/2024 at X2 doses    potassium chloride ER (KLOR-CON M) 10 MEQ CR tablet Take 1 tablet (10 mEq) by mouth daily  2/1/2024 at pm    predniSONE (DELTASONE) 20 MG tablet Take 1 tablet (20 mg) by mouth daily for 7 days  2/1/2024 at pm    tamsulosin (FLOMAX) 0.4 MG capsule Take 1 capsule (0.4 mg) by mouth daily  2/1/2024 at pm

## 2024-02-02 NOTE — PROGRESS NOTES
RECEIVING UNIT ED HANDOFF REVIEW    ED Nurse Handoff Report was reviewed by: Ysabel Young RN on February 2, 2024 at 2:37 AM

## 2024-02-02 NOTE — PLAN OF CARE
Pt here for  evaluation of worsening shortness of breath. A&O to self. Neuros including dementia. VSS on 4L NC. Tele SR. Mechanical dental soft diet. Up with A1/walker. Denies pain. Pt scoring green on the Aggression Stop Light Tool. Plan for speech and WOC consult. Discharge pending.

## 2024-02-02 NOTE — ED PROVIDER NOTES
"  History   Chief Complaint:  Shortness of Breath     HPI   Gavin House is a 78 year old male with history of AFIB and hypertension who presents to the ED with his wife for evaluation of shortness of breath. His wife reports patient has been having shortness of breath for the past 3 months and has been using albuterol \"as candy\". His shortness of breath worsened yesterday. His daughter reports patient went 6 inhalers in 6 hours. Patient couldn't breathe. His wife states he has been coughing up thick liquid mucus. Patient would vomit every time he eats or takes medications. He hasn't been taking his night medications within the last week. His wife adds on patient would take a bite of a sandwich and say he is full. Patient has been losing weight recently. He hasn't been getting up more than 2 hours in the day. No use of oxygen at home. Denies diarrhea or fever. No history of smoking.     Independent Historian:   Wife and daughter provides supplemental history as noted above.     Review of External Notes:   I reviewed the Office Visit Note from 12/20/23 - vomiting and appetite. I also reviewed the Cardiology Note from 11/14/23. I also reviewed the Discharge Summary from 6/29/23.    Reviewed prior discharge summary from June 29, 2023 when patient was admitted for wide-complex tachycardia with a left bundle branch block pattern concerning for atrial tachycardia/flutter with left bundle branch block aberrancy versus VT.  Troponin elevation during that admission thought to be due to mild myonecrosis.  Troponin went from -90.    Medications:    Albuterol inhaler   Pacerone  Eliquis  Lipitor  Rolaids  Lopressor  Potassium chloride  Prednisone  Flomax     Past Medical History:    Alcohol use  Allergic rhinitis,  Diverticulosis of colon  Hypertension  Memory loss  Mild intermittent asthma  Paroxysmal atrial fibrillation  Pneumothorax  PVC (premature ventricular contraction)  Rosacea  Scoliosis (and kyphoscoliosis), " idiopathic  Stroke       Past Surgical History:    Coronary angiogram  Right inguinal herniorrhaphy  Left inguinal herniorrhaphy      Physical Exam   Patient Vitals for the past 24 hrs:   BP Temp Temp src Pulse Resp SpO2 Weight   02/01/24 2244 -- -- -- -- -- -- 38.6 kg (85 lb)   02/01/24 2230 111/73 -- -- 80 19 95 % --   02/01/24 2215 -- -- -- 77 24 99 % --   02/01/24 2200 110/74 -- -- 76 22 100 % --   02/01/24 2145 -- -- -- 79 15 100 % --   02/01/24 2130 121/69 -- -- 78 19 98 % --   02/01/24 2100 (!) 138/111 97.3  F (36.3  C) Temporal 85 18 (!) 82 % --   General: Alert, appears elderly and cachetic. Cooperative.     In mild distress  HEENT:  Head:  Atraumatic  Ears:  External ears are normal  Mouth/Throat:  Oropharynx is without erythema or exudate and mucous membranes are moist.   Eyes:   Conjunctivae normal and EOM are normal. No scleral icterus.  CV:  Normal rate, regular rhythm, normal heart sounds and radial pulses are 2+ and symmetric.    Resp:  Breath sounds are distant to bilateral bases, no wheezing.  On 3 LPM O2 by NC.    Non-labored, no retractions or accessory muscle use  GI:  Abdomen is soft, no distension, RLQ tenderness. No rebound or guarding.  No CVA tenderness bilaterally  MS:  Normal range of motion. No edema.    Back atraumatic.    No midline cervical, thoracic, or lumbar tenderness  Skin:  Warm and dry.  No rash or lesions noted.  Neuro:   Alert. Globally weak.  GCS: 15  Psych: Normal mood and affect.    Emergency Department Course     ECG results from 02/01/24   EKG 12 lead     Value    Systolic Blood Pressure     Diastolic Blood Pressure     Ventricular Rate 83    Atrial Rate 83    KS Interval 156    QRS Duration 70        QTc 509    P Axis 72    R AXIS 82    T Axis 70    Interpretation ECG      Sinus rhythm  Possible Left atrial enlargement  Nonspecific T wave abnormality  Abnormal ECG  When compared with ECG of 14-JULY-2023   No significant change   Read at 2152       Imaging:  CT  Chest PE Abdomen Pelvis w Contrast   Final Result   IMPRESSION:   1.  A few small acute pulmonary emboli in segmental branches in the lower lobes.      2.  A few small peripheral splenic infarcts.      3.  No appendicitis.      4.  Colonic diverticula without CT evidence for diverticulitis.      5.  Bilateral pleural effusions. Worsening infiltrate within the right lower lobe with stable subpleural regions of consolidation and lingula and left lower lobe. Peripheral groundglass infiltrates in the right upper and middle lobes little change with    resolution of a previously seen subpleural infiltrate within the medial aspect of the left upper lobe.      6.  Results given to Dr. Alex Templeton.         Report per radiology    Laboratory:  Labs Ordered and Resulted from Time of ED Arrival to Time of ED Departure   COMPREHENSIVE METABOLIC PANEL - Abnormal       Result Value    Sodium 134 (*)     Potassium 5.4 (*)     Carbon Dioxide (CO2) 28      Anion Gap 13      Urea Nitrogen 18.0      Creatinine 0.88      GFR Estimate 89      Calcium 8.6 (*)     Chloride 93 (*)     Glucose 114 (*)     Alkaline Phosphatase 86      AST        ALT        Protein Total 7.2      Albumin 3.0 (*)     Bilirubin Total 1.4 (*)    CBC WITH PLATELETS AND DIFFERENTIAL - Abnormal    WBC Count 8.8      RBC Count 4.00 (*)     Hemoglobin 12.0 (*)     Hematocrit 37.5 (*)     MCV 94      MCH 30.0      MCHC 32.0      RDW 14.0      Platelet Count 259      % Neutrophils 91      % Lymphocytes 2      % Monocytes 5      % Eosinophils 1      % Basophils 0      % Immature Granulocytes 1      NRBCs per 100 WBC 0      Absolute Neutrophils 8.1      Absolute Lymphocytes 0.2 (*)     Absolute Monocytes 0.4      Absolute Eosinophils 0.0      Absolute Basophils 0.0      Absolute Immature Granulocytes 0.1      Absolute NRBCs 0.0     ISTAT GASES LACTATE VENOUS POCT - Abnormal    Lactic Acid POCT 1.9      Bicarbonate Venous POCT 30 (*)     O2 Sat, Venous POCT 38 (*)     pCO2  Venous POCT 50      pH Venous POCT 7.39      pO2 Venous POCT 23 (*)    TROPONIN T, HIGH SENSITIVITY - Abnormal    Troponin T, High Sensitivity 119 (*)    NT PROBNP INPATIENT - Abnormal    N terminal Pro BNP Inpatient 2,785 (*)    MAGNESIUM - Normal    Magnesium 2.0     PROCALCITONIN - Normal    Procalcitonin 0.19     RESPIRATORY AEROBIC BACTERIAL CULTURE     Procedures     Emergency Department Course & Assessments:       Interventions:  Medications   azithromycin (ZITHROMAX) 500 mg vial to attach to  mL bag (has no administration in time range)   heparin infusion 25,000 units in D5W 250 mL ANTICOAGULANT (has no administration in time range)   cefTRIAXone (ROCEPHIN) 1 g vial to attach to  mL bag for ADULTS or NS 50 mL bag for PEDS (has no administration in time range)   iopamidol (ISOVUE-370) solution 44 mL (44 mLs Intravenous $Given 2/1/24 2311)   Saline Flush (74 mLs Intravenous $Given 2/1/24 2311)   cefTRIAXone (ROCEPHIN) 1 g vial to attach to  mL bag for ADULTS or NS 50 mL bag for PEDS (0 g Intravenous Stopped 2/2/24 0121)   heparin ANTICOAGULANT Loading dose for HIGH INTENSITY TREATMENT * Give BEFORE starting heparin infusion (3,100 Units Intravenous $Given 2/2/24 0123)        Independent Interpretation (X-rays, CTs, rhythm strip):  None    Assessments/Consultations/Discussion of Management or Tests:  ED Course as of 02/02/24 0133   Thu Feb 01, 2024   2142 I obtained history and examined the patient as noted above.    Fri Feb 02, 2024   0041 Spoke with Dr. Haney of the hospitalist service who agreed to admission     Social Determinants of Health affecting care:   None    Disposition:  The patient was admitted to the hospital under the care of Dr. Haney.     Impression & Plan    CMS Diagnoses: None    Medical Decision Making:  Patient is a 78-year-old male with complex past medical history pertinent for dementia, atrial fibrillation, and progressive/chronic cachexia who presents with increasing  shortness of breath worsening over the last several days.  Of note the family has mentioned he has been quite short of breath over the last several months.  A broad workup was pursued this evening.  Initial EKG showed sinus rhythm with no concerning arrhythmias in comparison with prior EKGs.  There were nonspecific ST segment changes but no reported chest discomfort symptoms.  Troponin returned elevated concerning for demand ischemia versus NSTEMI.  Unfortunately BNP quite elevated at 2785.  Given hypoxia on arrival as well as shortness of breath symptoms we did obtain a CT scan of the chest abdomen and pelvis.  The patient did have some right lower quadrant abdominal pain on arrival and so thankfully no sinister intra-abdominal findings.  In terms of intrathoracic findings there were a few small acute pulmonary emboli in segmental branches within the lower lobes.  Additionally bilateral pleural effusions with a worsening infiltrate in the right lower lobe.  There are also infiltrates noticed in the lingula and left lower lobe.  Peripheral groundglass infiltrates in the right upper and middle lobes appear unchanged in comparison with prior CT images from mid January.  Thankfully no intra-abdominal infectious etiologies.  Given recent course of Levaquin will broaden IV antibiotics with Rocephin and azithromycin due to multifocal pna.  Thankfully white blood cell count appears normal here.  I have added on a procalcitonin.  Initial lactate returned normal at 1.9.  Patient does require supplemental oxygen approximately 3 to 4 L/min O2 by nasal cannula.  Given concern for multifocal pneumonia, possible heart failure versus NSTEMI, as well as acute pulmonary embolism will plan to admit under the care of the hospitalist service.  After discussing with the family the patient has been off of his Eliquis medication now for approximately a week and a half due to lack of refill.  We did start the patient on heparin in the  emergency department given lack of recent Eliquis use and newly diagnosed pulmonary embolism this evening.  Patient care discussed with Dr. Haney who agreed to admission.    Critical Care time was 35 minutes for this patient excluding procedures.    Diagnosis:    ICD-10-CM    1. Congestive heart failure, unspecified HF chronicity, unspecified heart failure type (H)  I50.9       2. NSTEMI (non-ST elevated myocardial infarction) (H)  I21.4       3. Acute respiratory failure with hypoxia (H)  J96.01       4. Multifocal pneumonia  J18.9       5. Pulmonary embolism, unspecified chronicity, unspecified pulmonary embolism type, unspecified whether acute cor pulmonale present (H)  I26.99            Discharge Medications:  New Prescriptions    No medications on file      Scribe Disclosure:  I, Rowena Isaac, am serving as a scribe at 9:54 PM on 2/1/2024 to document services personally performed by Alex Templeton MD based on my observations and the provider's statements to me.  2/1/2024   Alex Templeton MD White, Scott, MD  02/02/24 0143

## 2024-02-02 NOTE — CONSULTS
"[facility] Nurse Inpatient Assessment     Consulted for: \"coccygeal\"    Summary: No wound noted on assessment. Patient is high risk for PI development. Prevention orders entered. [facility] nurse will sign off at this time.    Patient History (according to provider note(s):      \"Patient is a 78-year-old male with complex past medical history pertinent for dementia, atrial fibrillation, and progressive/chronic cachexia who presents with increasing shortness of breath worsening over the last several days.\"    Assessment:      Areas visualized during today's visit: Posterior surfaces , Perineal area, and Sacrum/coccyx    Skin Injury Location: Coccyx/sacrum    Last photo: No open area identified; no photo  Skin injury due to: Friction  Skin history and plan of care: Patient is cachexic. Skin to sacrum and coccyx is intact with brown, devitalized epidermis. Patient is at high risk for pressure injury development due to his malnourished state and advanced dementia, as well as many comorbidities.Optifoam Gentle border in place on writer's assessment.   Affected area:      Skin assessment: Intact     Measurements (length x width x depth, in cm) No measurable wound     Color: normal and consistent with surrounding tissue and blanchable erythema     Temperature  normal      Drainage: none .      Color: none      Odor: none  Pain: denies , none  Pain interventions prior to dressing change: patient tolerated well and no significant pain present   Treatment goal: Protection  STATUS: initial assessment  Supplies ordered: gathered, supplies stored on unit, discussed with RN, and discussed with patient       Treatment Plan:     Coccyx and sacrum wound prevention: Daily  and PRN for soiling  Peel back sacral Mepilex dressing at least daily to assess skin for signs of breakdown.  Cleanse area with Annie spray and white cloths or saline. Dry well.  Swab surrounding skin with Cavilon no-sting barrier and let " "dry.  Apply a sacral Mepilex to provide protection to coccyx and sacrum.  ~ the same Mepilex dressing can be used up to 5 days if not soiled.    5.   Follow incontinence protocol and PIP Plan.    Pressure Injury Prevention (PIP) Plan:  -If patient is declining pressure injury prevention interventions: Explore reason why and address patient's concerns, If patient is still declining, document \"informed refusal\" , and Ensure Care team is aware ( provider, charge nurse, etc)  -Mattress: Follow bed algorithm, reassess daily and order specialty mattress, if indicated.  -HOB: Maintain at or below 30 degrees, unless contraindicated  -Repositioning in bed: Every 1-2 hours , Left/right positioning; avoid supine, and Raise foot of bed prior to raising head of bed, to reduce patient sliding down (shear)  -Heels: Keep elevated off mattress  -Protective Dressing: Sacral Mepilex for prevention (#[id number]),  especially for the agitated patient   -Positioning Equipment:  Pillows  -Moisture Management: Perineal cleansing /protection: Follow Incontinence Protocol, Avoid brief in bed, Clean and dry skin folds with bathing , and Moisturize dry skin  -Under Devices: Inspect skin under all medical devices during skin inspection , Ensure tubes are stabilized without tension, and Ensure patient is not lying on medical devices or equipment when repositioned     Orders: Written    RECOMMEND PRIMARY TEAM ORDER: None, at this time  Education provided: importance of repositioning, plan of care, Moisture management, and Off-loading pressure  Discussed plan of care with: Patient and Nurse  WOC nurse follow-up plan: signing off  Notify WOC if wound(s) deteriorate.  Nursing to notify the Provider(s) and re-consult the WOC Nurse if new skin concern.    DATA:     Current support surface: Standard  Standard gel/foam mattress (IsoFlex, Atmos air, etc)  Containment of urine/stool: Incontinence Protocol and Incontinent pad in bed  BMI: Body mass index is " "15.39 kg/m .   Active diet order: Orders Placed This Encounter      Combination Diet Regular Diet; Thin Liquids (level 0); Mechanical/Dental Soft Diet     Output: No intake/output data recorded.     Labs:   Recent Labs   Lab 02/01/24 2218 02/01/24 2128   ALBUMIN  --  3.0*   HGB 12.0*  --    WBC 8.8  --      Pressure injury risk assessment:   Sensory Perception: 3-->slightly limited  Moisture: 3-->occasionally moist  Activity: 3-->walks occasionally  Mobility: 3-->slightly limited  Nutrition: 2-->probably inadequate  Friction and Shear: 2-->potential problem  Thiago Score: 16    Janis Shen RN, CWOCN  Please contact via [employer] at name or group \"WOC nurse\"- M-F [phone]  Leave  @ [phone] for non-urgent needs. Checked occasionally M-F.   "

## 2024-02-02 NOTE — ED NOTES
[facility]  ED Nurse Handoff Report    ED Chief complaint: Shortness of Breath      ED Diagnosis:   Final diagnoses:   None       Code Status: Full Code    Allergies:   Allergies   Allergen Reactions    Penicillins        Patient Story: Pt brought in by family for shortness of breath x months, worse since yesterday. Wife reports failure to thrive, last meal was last weak and pt has been having increasing weakness and she can no longer take care of him at home. Room air sats 82% - improved to 95% with nasal cannula 4 liters.    Focused Assessment:  Cardiac-WDL  Resp-SOB on 4 LPM NC  Neuro-WDL    Treatments and/or interventions provided: none  Patient's response to treatments and/or interventions: NA    To be done/followed up on inpatient unit:  monitor    Does this patient have any cognitive concerns?:  A/OX4    Activity level - Baseline/Home:  Stand with Assist  Activity Level - Current:   Total Care    Patient's Preferred language: English   Needed?: No    Isolation: None  Infection: Not Applicable  Patient tested for COVID 19 prior to admission: NO  Bariatric?: No    Vital Signs:   Vitals:    02/01/24 2200 02/01/24 2215 02/01/24 2230 02/01/24 2244   BP: 110/74  111/73    Pulse: 76 77 80    Resp: 22 24 19    Temp:       TempSrc:       SpO2: 100% 99% 95%    Weight:    38.6 kg (85 lb)       Cardiac Rhythm:     Was the PSS-3 completed:   No  What interventions are required if any?               Family Comments: none  OBS brochure/video discussed/provided to patient/family: N/A              Name of person given brochure if not patient: NA              Relationship to patient: NA    For the majority of the shift this patient's behavior was Green.   Behavioral interventions performed were NA.    ED NURSE PHONE NUMBER: [phone]

## 2024-02-02 NOTE — PROGRESS NOTES
SW:  D: care management consult for hospice acknowledged. Updated from Dr. Gross that family Is thinking about home hospice and will be at the hospital later this afternoon to hospice planning further.     MARCY Boudreaux, SW   Social Work   [facility]

## 2024-02-02 NOTE — PLAN OF CARE
Neuro- oriented to self only  Most Recent Vitals- Temp: 98  F (36.7  C) Temp src: Axillary BP: 112/64 Pulse: 71   Resp: 18 SpO2: 90 % O2 Device: Nasal cannula   Tele/Cardiac- NA  Resp- LS clear, dim  Activity- SBA/ A1  Pain- denies  Drips- no IV access  Drains/Tubes- No IV access, okay per MD  Skin- WNL, blanchable redness to coccyx. WOCN following  GI/- voiding minimally, minimal intake. Eating just bites of food and sips of water.  Aggression Color- Green  COVID status- Negative  Plan- discharge home with hospice Sunday/ Monday  Misc- transferred to room 816, report called and family aware    Albina Pelaez, RN Goal Outcome Evaluation:

## 2024-02-02 NOTE — ED TRIAGE NOTES
Pt brought in by family for shortness of breath x months, worse since yesterday. Wife reports failure to thrive, last meal was last weak and pt has been having increasing weakness and she can no longer take care of him at home. Room air sats 82% - improved to 95% with nasal cannula 4 liters.

## 2024-02-03 NOTE — PROGRESS NOTES
Care Management Follow Up    Length of Stay (days): 1    Expected Discharge Date: 02/04/2024     Concerns to be Addressed: other (see comments) (Hospice)  Hospice  Patient plan of care discussed at interdisciplinary rounds: Yes    Anticipated Discharge Disposition: Hospice, Home  Disposition Comments: Home with family and hospice support  Anticipated Discharge Services: None  Anticipated Discharge DME: None    Patient/family educated on Medicare website which has current facility and service quality ratings: yes  Education Provided on the Discharge Plan: Yes  Patient/Family in Agreement with the Plan: yes    Referrals Placed by CM/SW: Hospice  Private pay costs discussed: Not applicable    Additional Information:  Previous  met with patient and family and discussed/educated/elected hospice for discharge. Family requested [facility] for the agency and were awaiting if [facility] was able to accept.   Writer placed call to [facility] - hospice liaison ([phone]) today and he stated that he is still awaiting a response from his team whether they have the capacity to accept this weekend referral. [facility] is aware that if they are not able to admit tomorrow, another agency would be selected. Writer requested for a call back asap to allow for time to coordinate discharge.     Writer updated patient's daughter Melanie on the above information.    Addendum 1501: Writer received a call back from [facility] - hospice liaison with [facility] stating they do not have the capacity to staff an admission tomorrow 2/4. Writer placed call to patient's daughter Melanie to update her on this and she stated their priority is to get patient home tomorrow. Noted the other two agencies they had looked into were [facility] and [facility] - Melanie stated she would like to select whichever agency can accept patient tomorrow after 1300. Writer placed call to both [facility] and [facility] - [facility] does not have an opening until Monday and [facility] can do  an admission tomorrow 2/4 after 1300.   Writer spoke directly with Maxi at [facility] regarding a 1300 admission on 2/4. Writer confirmed patient will need oxygen and a bedside commode at discharge. Maxi stated the only hiccup could potentially be that the company they use for medical equipment does not always have the ability to deliver on a Sunday. Maxi is reaching out to medical supply company and will follow up with writer.     Addendum 1545: Max[facility] reached back out to writer informing they are able to get O2 delivered tomorrow 2/4 through their medical supply company who will reach out to daughter Melanie tomorrow to give an ETA with delivery time. Hospice team will be at patient's home at 1300. Writer updated hospitalist that a 3 day supply of medications will be needed at discharge - hospitalist is aware and will call pharmacy in the AM. Writer updated Melanie on the above plan and she is in agreement. Melanie states they would like to provide transportation for patient as they feel a stretcher ride would cause increased distress.  Melanie and family are in agreement and comfortable with the plan.   Bedside RN updated.    Maxi from [facility] confirmed they will be able to pull discharge orders from Epic and there is no need for writer to fax.     Writer to continue to assist with discharge.     MARCY Alejandro, [facility]    [facility]

## 2024-02-03 NOTE — PLAN OF CARE
Orientation: Alert to self.   Activity: A1 GB + W. T/R  Diet/BS Checks: Minced/moist diet, thin liquids.   Tele:  N/a.   IV Access/Drains: No IV access. MD aware.   Pain Management: Denies. No comfort meds ordered yet.   Abnormal VS/Results: VSS on 3L O2.   Bowel/Bladder: Incontinent. External cath in place.   Skin/Wounds: Preventative sacral mepi in place.  D/C Disposition: Pending.   Other Info: This AM pt c/o SOB and felt he was unable catch a breath. MD aware.

## 2024-02-03 NOTE — PROGRESS NOTES
[facility]    Medicine Progress Note - Hospitalist Service    Date of Admission:  2/1/2024    Interval History   Called early this a.m. that patient was reporting shortness of breath.  He could not really define it any better.  Came to see the patient who had slight increased respiratory rate but did not appear in any distress.  On oxygen.  When asked he cannot really define whether he was short of breath.  Started on comfort meds.  Start low-dose morphine as needed.  Received call later that the family wants to get patient home as soon as possible.  Plan for patient to go home tomorrow with hospice.   Will need meds filled in the morning.    Assessment & Plan   Gavin House is a 78 year old male with a history of dementia, failure to thrive, BPH, PAF, WCT, CVA, alcohol use, admitted on 2/1/2024. He presents to the emergency department for evaluation of worsening shortness of breath over the past days, but really over the past several months as well.  Ongoing weight loss since approximately November.     Advanced care planning:   FTT   Dementia (alzheimers)   Called family today including daughter and wife.  Patient has been in failure to thrive mode with ongoing weight loss, not wanting to eat.  Based on epic chart review had at least cognitive impairment back to 2021 with a Low SLUMS score  Reviewed current findings this admission and treatments.  He has been declining.  Discussed risks and benefits of doing any further diagnostic testing including evaluation for possible dysphagia.  Discussion with the family that even if after diagnostic testing he was found to have some malignancy or underlying condition that would require surgery, follow-up visits and assessments would the family want to pursue this given his inability to really engage in his treatments and prolong his life in the setting of advancing dementia.  Daughter appeared to be relieved feeling potentially they have been  trying to find a reason for his failure to thrive but likely has a strong component of underlying advancing dementia  Discussion regarding potential swallow studies, video swallows or assessments of his ability to eat or swallow.  Again discussed whether the patient would want to have his life extended and the current quality of life that he has.  After going through options of more restorative cares including testing potential swallow studies endoscopies and ways to extend his life or determine diagnosis the family wanted to pursue comfort.   During his time in the hospital he is already pulled out 4 IV, confused.   Has been difficult for him to get out of bed at home.  His wife is trying to do sink type baths as the patient has not been able to even get in the bathtub or shower with his overall frailty.  She is most tearful about the fact that he just will not eat: Explained this can be part of the end stages of dementia. (Underlying pathology not excluded however family not needing to know or pursue aggressive diagnostic studies)   Care management consultation requested for hospice evaluation and enrollment   Family is interested in home hospice.  Qualifying diagnoses would be dementia with weight loss and severe protein calorie malnutrition with failure to thrive as well as acute hypoxic respiratory failure, multifactorial in nature.  Patient is DNR/DNI  He lives with his wife and they both live with the daughter and her family.  She has been trying to do all of his cares.  2/3 patient transition to comfort cares  Concerns that the patient was short of breath this morning.  He was started on routines with morphine for dyspnea.  Placed on comfort cares.  Received a phone call from social work that patient is able to enroll in hospice tomorrow.  Will fill meds in the a.m. so that he can get home for hospice in the afternoon and will need oxygen.  Anticipated discharge to/for home with hospice.  Meds for  comfort    Unstageable coccygeal ulceration  Wound nurse consulted for unstageable coccygeal ulceration present on admission (not open, but darkened tissue overlying coccyx/low sacrum).    Major neurocognitive disorder, suspect Alzheimer's dementia:   Per assessment with Dr. Haney In 2021, patient with 13 out of 30 on slums in the setting of noted memory impairment.  Underwent brain MRI with some atrophy and small vessel disease as well as neurology follow-up.  Neuropsychiatric testing was recommended, but patient decline to follow-up for this at that time (per wife).  Has had ongoing memory impairment, and here tonight, he initially cannot recall the name of his daughter, cannot recall the name of his son (questions if it might be Mehran, who is his wife's brother).  He initially tells me the year is 1945, the year of his birth.  When asked again after being told that is wrong, he guesses that it might be 1959.  He thinks the month might be April, and when I tell him that it is February and ask what season it is, he suggests the season is January.  Will occasionally use humor to answer in generalities and avoid directly answering questions (how old are you?  I feel like I am 89).  I suspect that a major contributor to patient's ongoing decline is his underlying dementia which may be resulting in dysphagia and poor intake with weight loss and pulmonary infiltrates.  He cannot recall when he ate or if he ate, but tells me he believes he ate today when his wife at bedside tells me this is not the case.  Daughter expressed some thoughts of guilt  that she should have done something about his dementia earlier.  Explained this is a progressive disease and the treatments are not great.  He has progressed in his dementia over time that would have happened.  He is initial score in 2021 of 13/30 already noted significant impairment  Emphasized to the daughter that she has been supportive and that she has taken her parents in  "and has been caring for them in her own home. (Living with her) \"promised her parents they would not need to go to a NH)\"      Suspect acute kidney injury:  Bladder scan, straight catheterize as needed.   If patient is requiring straight catheterization for urinary retention, please page for order   Stop diuretic      Atrial fibrillation, paroxysmal:   [id number] score of 5.    On Eliquis prior to admission: STOP   Stop amiodarone  Will continue on metoprolol.    Acute respiratory failure with hypoxia, multifactorial: Contributions from restrictive lung disease with scoliosis and 56% vital capacity on prior pulmonary function tests, what appears to be chronic aspiration and multifocal pneumonia, bilateral pulmonary emboli, bilateral pleural effusions,  Multifocal pneumonia: Patient with scattered infiltrates, changing over time on CT scan with resolution of inferior left upper lobe opacity after recent completion of levofloxacin 1/17 - 1/27/2024.  Worsening predominantly right-sided basilar pneumonia highly suggestive of aspiration.  Initially started on ceftriaxone and Zithromax on admission: stopped   Discussion with the family at length.  Included and goals of care was discussion regarding antibiotics.  Daughter and wife do not want to continue antibiotics.  He had scheduled pulmonary assessments in the future but again given his global decline discussion about quality of life and actually pursuing any further workups. Family does not wish to pursue      Non-ST elevation myocardial infarction:   Troponin elevated at 119.  Similar elevation during hospitalization in June attributed to demand ischemia.    Suspect demand ischemia in the setting of systemic hypoxia.    Very minimal coronary artery disease on angiogram June 2023.  Stop heparin with transition to hospice.  He is now torn out 4 IV and will not replace      Diet: Combination Diet Minced and Moist Diet (level 5); Thin Liquids (level " 0)  Snacks/Supplements Adult: Ensure Enlive; Between Meals  Snacks/Supplements Adult: Gelatein Plus; With Meals  Room Service    DVT Prophylaxis: none with transition to hospice   Moore Catheter: Not present  Lines: None     Cardiac Monitoring: None  Code Status: No CPR- Do NOT Intubate      Clinically Significant Risk Factors        # Hyperkalemia: Highest K = 5.4 mmol/L in last 2 days, will monitor as appropriate       # Hypoalbuminemia: Lowest albumin = 3 g/dL at 2/1/2024  9:28 PM, will monitor as appropriate       # Hypertension: Noted on problem list  # Chronic heart failure with preserved ejection fraction: heart failure noted on problem list and last echo with EF >50%           # Financial/Environmental Concerns: none         Disposition Plan      Expected Discharge Date: 02/04/2024      Destination: home with help/services;home with family              SIOBHAN VELASCO MD  Hospitalist Service  [facility]  Securely message with txtr (more info)  Text page via [employer] Paging/Directory   ______________________________________________________________________    Physical Exam   Vital Signs: Temp: 97.4  F (36.3  C) Temp src: Oral BP: 123/65 Pulse: 67   Resp: 18 SpO2: 96 % O2 Device: Nasal cannula Oxygen Delivery: 2 LPM  Weight: 101 lbs 3.06 oz    General Appearance: Patient extremely frail and cachectic   Poor dentition he was awake.  Confused.  Did not know the year or where he was.  Slight increased respiratory rate  Respiratory: Coarse rhonchi without wheezes   Cardiovascular: regular rate with no gallop or rub  GI: + BS, soft, non tender  Skin: Bruises       Medical Decision Making       30 MINUTES SPENT BY ME on the date of service doing chart review, history, exam, documentation & further activities per the note.    Medications sent for disposition and discharge.  Started of comfort cares.    Data         Imaging results reviewed over the past 24 hrs:   No results found for this or  any previous visit (from the past 24 hour(s)).

## 2024-02-03 NOTE — PLAN OF CARE
6 p to 7 p, patient came up from  at 6 pm, VSS on 1 L O2, denies pain, confused, follows commands, mech soft diet, needs assistance in feeding, incontinent, A1 GB and W, ambulated from  to bed, discharging to home on hospice care most likely on Monday,SW following.

## 2024-02-03 NOTE — PLAN OF CARE
Shift note: [id number]    Orientation: Aox1 to self  Activity: A1 GB + W. T/R q2h  Diet/BS Checks: Soft mechanical, refused all meals this shift.   Tele:  N/A  IV Access/Drains: No IV access. MD is aware.   Pain Management: Denies. Morphine given x2 for SOB  Abnormal VS/Results: AVSS on 2L O2 via OxyMask  Bowel/Bladder: external cath in place. Incontinent of bowels. No BM this shift.   Skin/Wounds: Preventative mepi on sacrum.   Consults: n/a  D/C Disposition: Pending  Other Info:

## 2024-02-04 NOTE — PROGRESS NOTES
Care Management Follow Up    Length of Stay (days): 2    Expected Discharge Date: 02/05/2024     Concerns to be Addressed: other (see comments) (Hospice)  Hospice  Patient plan of care discussed at interdisciplinary rounds: Yes    Anticipated Discharge Disposition: Hospice, Home  Disposition Comments: Home with family and hospice support  Anticipated Discharge Services: None  Anticipated Discharge DME: None    Patient/family educated on Medicare website which has current facility and service quality ratings: yes  Education Provided on the Discharge Plan: Yes  Patient/Family in Agreement with the Plan: yes    Referrals Placed by CM/SW: Hospice  Private pay costs discussed: Not applicable    Additional Information:  Writer was updated by hospitalist that she does not feel it is safe for patient to discharge home at this time with hospice as he has had changes and increased air hunger. Hospitalist updated daughter Melanie of this who is aware of this and will be coming into the hospital to spend time with patient.   Writer placed call to [facility] and spoke with  Jackson to cancel the planned admission for today. Jackson confirmed that he reached out to the DME company to cancel the delivery for equipment.    Writer to be available for any further needs should they arise.     Cindy Bahena, MARCY, LGSW    [facility]

## 2024-02-04 NOTE — PLAN OF CARE
Shift note: [id number]     Orientation: Aox1 to self  Activity: A1 GB + W. T/R q2h  Diet/BS Checks: Soft mechanical, refused all meals this shift.   Tele:  N/A  IV Access/Drains: No IV access. MD is aware.   Pain Management: Denies. Scheduled morphine for SOB  Abnormal VS/Results: AVSS on 3L O2 via NC  Bowel/Bladder: external cath in place. Incontinent of bowels. No BM this shift.   Skin/Wounds: Preventative mepi on sacrum.   Consults: n/a  D/C Disposition: Pending  Other Info: Morphine and ativan x1 given for SOB. Pt is no longer discharging home. GIP consulted to come on Monday.

## 2024-02-04 NOTE — PLAN OF CARE
[id number]  Orientation: Alert to self.   Activity: A1 GB + W. T/R.  Diet/BS Checks: Minced/moist diet, thin liquids. No appetite.   Tele:  N/a.   IV Access/Drains: No IV access. MD aware.   Pain Management: Denies.   Abnormal VS/Results: VSS on 4-6L O2.   Bowel/Bladder: Incontinent. External cath in place with little UOP.   Skin/Wounds: Preventative sacral mepi in place.  D/C Disposition: Pending.   Other Info: Pt experiencing air hunger. Morphine q2hr - given x2.

## 2024-02-04 NOTE — PROGRESS NOTES
[facility]  _________________________________________________________________    [facility] 24/7 Contact Number: [phone]    - Providers: Please contact [facility] with changes in orders or clinical plan of care   - Nursing: Please contact [facility] with significant changes in patient condition    Hospice will notify the care team (including the hospitalist) to confirm date of inpatient hospice (GIP) admission.    New Epic encounter will not be created until hospice completes admission.   _____________________________________________________________________RECEIVED, THANK YOU FOR THE REFERRAL, WRITER WILL CONTACT PT/FAMILY TO SCHEDULE.

## 2024-02-04 NOTE — PROGRESS NOTES
[facility]    Medicine Progress Note - Hospitalist Service    Date of Admission:  2/1/2024    Interval History   Patient complaining of shortness of breath. Motioned to get an inhaler. Started on morphine. He falls asleep and then wakes up reporting he can't breath.   Doesn't want to be alone. Cancelled discharged and started on scheduled inpatient meds for dyspnea. GIP consult placed.   Called family and updated on plan to have him stay for symptom management and he may not make it home.   Family here in afternoon and discussion regarding care plan.  Plan to continue scheduled medication for comfort.  He has additional Ativan if needed.  Will also place a Moore.    Assessment & Plan   Gavin House is a 78 year old male with a history of dementia, failure to thrive, BPH, PAF, WCT, CVA, alcohol use, admitted on 2/1/2024. He presents to the emergency department for evaluation of worsening shortness of breath over the past days, but really over the past several months as well.  Ongoing weight loss since approximately November.     Advanced care planning:   FTT   Dementia (alzheimers)   Called family 2/3  including daughter and wife. Later came to the hospital and confirmed plans   Patient has been in failure to thrive mode with ongoing weight loss, not wanting to eat.  Based on epic chart review had at least cognitive impairment back to 2021 with a Low SLUMS score  Reviewed current findings this admission and treatments.  He has been declining.  Discussed risks and benefits of doing any further diagnostic testing including evaluation for possible dysphagia.  Discussion with the family that even if after diagnostic testing he was found to have some malignancy or underlying condition that would require surgery, follow-up visits and assessments would the family want to pursue this given his inability to really engage in his treatments and prolong his life in the setting of advancing  dementia.  Daughter appeared to be relieved feeling potentially they have been trying to find a reason for his failure to thrive but likely has a strong component of underlying advancing dementia  Discussion regarding potential swallow studies, video swallows or assessments of his ability to eat or swallow.  Again discussed whether the patient would want to have his life extended and the current quality of life that he has.  After going through options of more restorative cares including testing potential swallow studies endoscopies and ways to extend his life or determine diagnosis the family wanted to pursue comfort.   During his time in the hospital he is already pulled out 4 IV, confused.   Has been difficult for him to get out of bed at home.  His wife is trying to do sink type baths as the patient has not been able to even get in the bathtub or shower with his overall frailty.  She is most tearful about the fact that he just will not eat: Explained this can be part of the end stages of dementia. (Underlying pathology not excluded however family not needing to know or pursue aggressive diagnostic studies)   Care management consultation requested for hospice evaluation and enrollment   Family is interested in home hospice.  Qualifying diagnoses would be dementia with weight loss and severe protein calorie malnutrition with failure to thrive as well as acute hypoxic respiratory failure, multifactorial in nature.  Patient is DNR/DNI  He lives with his wife and they both live with the daughter and her family.  She has been trying to do all of his cares.  2/3 patient transition to comfort cares  2/4 called his family and updated on events earlier in day.  Would not recommend discharge.  Discussion regarding the concerns for symptom control and that not having him come home and more of a symptomatic crisis.  Canceled discharged with GIP inpatient consultation.  Daughter had mentioned some hesitancy from his wife  regarding being able to manage this before the patient was discharged.  She was more nervous about making sure his symptoms were controlled.  Decision to leave patient here and at least start more consistent treatment of symptoms.>>  Anticipate inpatient GIP.  2/4 patient reporting shortness of breath today.  He did not want people to leave the room and was trying to hold hands with comfort at the bedside.  Discussion with nursing staff.  Started on morphine 5 mg every 4 hours scheduled added Ativan 0.5 mg every 4 hours as needed.  Increase in as needed meds but also schedule morphine.  Moore catheter for comfort.  Discussion with the family that given the change in events over the last 24 hours anticipate the patient will likely pass in the hospital.:  He has transitioned quickly to deterioration in respiratory status      Unstageable coccygeal ulceration  Wound nurse consulted for unstageable coccygeal ulceration present on admission (not open, but darkened tissue overlying coccyx/low sacrum).    Major neurocognitive disorder, suspect Alzheimer's dementia:   Per assessment with Dr. Haney In 2021, patient with 13 out of 30 on slums in the setting of noted memory impairment.  Underwent brain MRI with some atrophy and small vessel disease as well as neurology follow-up.  Neuropsychiatric testing was recommended, but patient decline to follow-up for this at that time (per wife).  Has had ongoing memory impairment, and here tonight, he initially cannot recall the name of his daughter, cannot recall the name of his son (questions if it might be Mehran, who is his wife's brother).  He initially tells me the year is 1945, the year of his birth.  When asked again after being told that is wrong, he guesses that it might be 1959.  He thinks the month might be April, and when I tell him that it is February and ask what season it is, he suggests the season is January.  Will occasionally use humor to answer in generalities and  "avoid directly answering questions (how old are you?  I feel like I am 89).  I suspect that a major contributor to patient's ongoing decline is his underlying dementia which may be resulting in dysphagia and poor intake with weight loss and pulmonary infiltrates.  He cannot recall when he ate or if he ate, but tells me he believes he ate today when his wife at bedside tells me this is not the case.  Daughter expressed some thoughts of guilt  that she should have done something about his dementia earlier.  Explained this is a progressive disease and the treatments are not great.  He has progressed in his dementia over time that would have happened.  He is initial score in 2021 of 13/30 already noted significant impairment  Emphasized to the daughter this admit that she has been supportive and that she has taken her parents in and has been caring for them in her own home. (Living with her) \"promised her parents they would not need to go to a NH)\"      Suspect acute kidney injury:  Bladder scan, straight catheterize as needed.   If patient is requiring straight catheterization for urinary retention, please page for order   Stop diuretic      Atrial fibrillation, paroxysmal:   [id number] score of 5.    On Eliquis prior to admission: STOP   Stop amiodarone  Will continue on metoprolol.    Acute respiratory failure with hypoxia, multifactorial: Contributions from restrictive lung disease with scoliosis and 56% vital capacity on prior pulmonary function tests, what appears to be chronic aspiration and multifocal pneumonia, bilateral pulmonary emboli, bilateral pleural effusions,  Multifocal pneumonia: Patient with scattered infiltrates, changing over time on CT scan with resolution of inferior left upper lobe opacity after recent completion of levofloxacin 1/17 - 1/27/2024.  Worsening predominantly right-sided basilar pneumonia highly suggestive of aspiration.  Initially started on ceftriaxone and Zithromax on admission: " stopped   Discussion with the family at length.  Included and goals of care was discussion regarding antibiotics.  Daughter and wife do not want to continue antibiotics.  He had scheduled pulmonary assessments in the future but again given his global decline discussion about quality of life and actually pursuing any further workups. Family does not wish to pursue      Non-ST elevation myocardial infarction:   Troponin elevated at 119.  Similar elevation during hospitalization in June attributed to demand ischemia.    Suspect demand ischemia in the setting of systemic hypoxia.    Very minimal coronary artery disease on angiogram June 2023.  Stop heparin with transition to hospice.  He is now torn out 4 IV and will not replace      Diet: Combination Diet Minced and Moist Diet (level 5); Thin Liquids (level 0)  Snacks/Supplements Adult: Ensure Enlive; Between Meals  Snacks/Supplements Adult: Gelatein Plus; With Meals  Room Service    DVT Prophylaxis: none with transition to hospice   Moore Catheter: Not present  Lines: None     Cardiac Monitoring: None  Code Status: No CPR- Do NOT Intubate      Clinically Significant Risk Factors              # Hypoalbuminemia: Lowest albumin = 3 g/dL at 2/1/2024  9:28 PM, will monitor as appropriate       # Hypertension: Noted on problem list  # Chronic heart failure with preserved ejection fraction: heart failure noted on problem list and last echo with EF >50%           # Financial/Environmental Concerns: none         Disposition Plan      Expected Discharge Date: 02/05/2024      Destination: home with help/services;home with family              SIOBHAN VELASCO MD  Hospitalist Service  [facility]  Securely message with [employer] (more info)  Text page via [employer] Paging/Directory   ______________________________________________________________________    Physical Exam   Vital Signs:           Resp: 15 SpO2: 98 % O2 Device: Nasal cannula Oxygen Delivery: 6  LPM  Weight: 101 lbs 3.06 oz    General Appearance: Patient extremely frail and cachectic   Poor dentition   Patient is more awake.  Appears to feel short of breath: Asking for inhaler.  Respiratory: Coarse rhonchi without wheezes   Cardiovascular: regular rate with no gallop or rub  GI: + BS, soft, non tender  Skin: Bruises       Medical Decision Making       45 MINUTES SPENT BY ME on the date of service doing chart review, history, exam, documentation & further activities per the note.    Medications sent for disposition and discharge.  Started of comfort cares.  Multiple changes in medications today to achieve comfort for the patient.  Discussion with the family.  Bedside nurse    Data         Imaging results reviewed over the past 24 hrs:   No results found for this or any previous visit (from the past 24 hour(s)).

## 2024-02-04 NOTE — PLAN OF CARE
Shift note: [id number]    Orientation: Aox1 to self  Activity: A1 T/R q2h  Diet/BS Checks: Soft mechanical, refused meals this shift.   Tele:  N/A  IV Access/Drains: No IV access. MD is aware.   Pain Management: Denies. Morphine given x1 for SOB  Abnormal VS/Results: AVSS on 2L O2 via OxyMask  Bowel/Bladder: External cath in place. Incontinent of bowels. Small smear this shift   Skin/Wounds: Preventative mepi on sacrum.   Consults: n/a  D/C Disposition: Possibly tomorrow w/family  Other Info:

## 2024-02-05 NOTE — PLAN OF CARE
Shift Summary [id number]     Orientation: Aox1 to self  Activity: A1 T/R Q2h  Diet/BS Checks: Soft mechanical, no meals and no appetite  Tele:  N/A  IV Access/Drains: No IV access. MD is aware.   Pain Management: Denies. Morphine given for SOB and Ativan for agitation/anxiety  Abnormal VS/Results: AVSS on 3L O2 via NC  Bowel/Bladder: Moore placed for retention and Incontinent of bowels. No BM this shift.   Skin/Wounds: Preventative mepi on sacrum.   Consults: n/a  D/C Disposition: Pt. will no longer be discharged home  Other Info: Morphine and Ativan given for SOB. Family at bedside

## 2024-02-05 NOTE — PLAN OF CARE
Shift note: [id number]     Orientation: [facility]  Activity: A1 GB + W. T/R q2h  Diet/BS Checks: Soft mechanical, refused all meals this shift.   Tele:  N/A  IV Access/Drains: No IV access. MD is aware.   Pain Management: Denies. Scheduled morphine and PRN ativan x1 given for agitation.  Abnormal VS/Results: AVSS on 3L O2 via NC  Bowel/Bladder: Moore in place for end of life and retention. Incontinent of bowels. No BM this shift.   Skin/Wounds: Preventative mepi on sacrum.   Consults: GIP  D/C Disposition: Pending  Other Info: Pt admitted GIP hospice. PRN atropine given x1.

## 2024-02-05 NOTE — PLAN OF CARE
Speech Language Therapy Discharge Summary    Reason for therapy discharge:    Change in medical status.    Progress towards therapy goal(s). See goals on Care Plan in [facility] electronic health record for goal details.  Goals not met.  Barriers to achieving goals:   patient has transitioned to comfort measures only.    Therapy recommendation(s):    No further therapy is recommended.

## 2024-02-05 NOTE — PLAN OF CARE
[id number]  Orientation: obtunded  Activity: A1 GB + W. Not OOB. Family declined repositioning  Diet/BS Checks: Minced/moist diet, thin liquids. Not taking anything by mouth  Tele:  N/a.   IV Access/Drains: No IV access. MD aware.   Pain Management: Denies.   Abnormal VS/Results: on 3L oxygen, roxanol given for air hunger  Bowel/Bladder: Moore placed on evenings, no BM this shift  Skin/Wounds: Preventative sacral mepi in place.  D/C Disposition: Pending.   Other Info: Appearing comfortable with scheduled roxanol, no ativan given. GIP hospice consult

## 2024-02-05 NOTE — PROGRESS NOTES
[facility]    Medicine Progress Note - Hospitalist Service    Date of Admission:  2/1/2024    Interval History   Patient barely opens eyes a little bit this am. Daughter reports he is no longer grasping her hand.  Breathing is more shallow.  His daughter was concerned maybe his fingertips were starting to get dusky as well.  Daughter here with her good friend.  Reportedly her mother (his wife) has a hard time coming to the hospital and seeing him like this.  Discussion regarding transition to GIP although currently symptomatically appears to be comfortable.    Assessment & Plan   Gavin House is a 78 year old male with a history of dementia, failure to thrive, BPH, PAF, WCT, CVA, alcohol use, admitted on 2/1/2024. He presents to the emergency department for evaluation of worsening shortness of breath days prior to admission but really over the past several months as well.  Ongoing weight loss since approximately November.     Advanced care planning:   FTT   Dementia (alzheimers)   Patient had been in failure to thrive mode with ongoing weight loss, not wanting to eat. Declining   Based on epic chart review had at least cognitive impairment back to 2021 with a Low SLUMS score  Discussed risks and benefits of doing any further diagnostic testing including evaluation for possible dysphagia.  Discussion with the family that even if after diagnostic testing he was found to have some malignancy or underlying condition that would require surgery, follow-up visits and assessments would the family want to pursue this given his inability to really engage in his treatments and prolong his life in the setting of advancing dementia.  After going through options of more restorative cares including testing potential swallow studies endoscopies and ways to extend his life or determine diagnosis the family wanted to pursue comfort.   2/3 patient made comfort cares  2/4 patient started to have increasing  complaints of shortness of breath dyspnea.  Initial plans for home with hospice which was canceled for GIP stay  2/4 started on morphine scheduled 5 mg every 4 with as needed Ativan 0.5 every 4 as needed.  Throughout the day he became more comfortable. Moore placed  2/5 barely opens his eyes.  No longer grasping his daughter's hand.  Appears to be actively dying.  Continue with comfort cares and transition to [facility]      Unstageable coccygeal ulceration  Wound nurse consulted for unstageable coccygeal ulceration present on admission (not open, but darkened tissue overlying coccyx/low sacrum).    Major neurocognitive disorder, suspect Alzheimer's dementia:   Failure to thrive and decline over time.  2/3 transition to comfort cares     Suspect acute kidney injury:  Fluids on admission.  No further labs.   Moore for comfort and retention      Atrial fibrillation, paroxysmal:   [id number] score of 5.    On Eliquis prior to admission: STOP   Stop amiodarone  All po meds outside of comfort stopped     Acute respiratory failure with hypoxia, multifactorial: Contributions from restrictive lung disease with scoliosis and 56% vital capacity on prior pulmonary function tests, what appears to be chronic aspiration and multifocal pneumonia, bilateral pulmonary emboli, bilateral pleural effusions,  Multifocal pneumonia: Patient with scattered infiltrates, changing over time on CT scan with resolution of inferior left upper lobe opacity after recent completion of levofloxacin 1/17 - 1/27/2024.  Worsening predominantly right-sided basilar pneumonia highly suggestive of aspiration.  Initially started on ceftriaxone and Zithromax on admission: stopped      Non-ST elevation myocardial infarction:   Troponin elevated at 119.  Similar elevation during hospitalization in June attributed to demand ischemia.    Suspect demand ischemia in the setting of systemic hypoxia.    Very minimal coronary artery disease on angiogram June 2023.  Stop  heparin with transition to hospice.     Diet: for comfort.     DVT Prophylaxis: none with transition to hospice   Moore Catheter: PRESENT, indication: Retention;End of Life  Lines: None     Cardiac Monitoring: None  Code Status: No CPR- Do NOT Intubate      Clinically Significant Risk Factors              # Hypoalbuminemia: Lowest albumin = 3 g/dL at 2/1/2024  9:28 PM, will monitor as appropriate       # Hypertension: Noted on problem list  # Chronic heart failure with preserved ejection fraction: heart failure noted on problem list and last echo with EF >50%           # Financial/Environmental Concerns: none         Disposition Plan      Expected Discharge Date: 02/07/2024      Destination: home with help/services;home with family              SIOBHAN VELASCO MD  Hospitalist Service  [facility]  Securely message with [employer] (more info)  Text page via [employer] Paging/Directory   ______________________________________________________________________    Physical Exam   Vital Signs:                    Weight: 101 lbs 3.06 oz    General Appearance: Patient appears to be comfortable.  He has no respiratory distress.  Not responsive to voice or commands.  Respiratory: Coarse rhonchi without wheezes   Cardiovascular: regular rate with no gallop or rub  GI: + BS, soft, non tender  Skin: Bruises       Medical Decision Making       30 MINUTES SPENT BY ME on the date of service doing chart review, history, exam, documentation & further activities per the note.    Data         Imaging results reviewed over the past 24 hrs:   No results found for this or any previous visit (from the past 24 hour(s)).

## 2024-02-05 NOTE — PROGRESS NOTES
[facility]    Consult Note - [facility]  _________________________________________________________________    [facility] 24/7 Contact Number: [phone]    - Providers: Please contact [facility] with changes in orders or clinical plan of care   - Nursing: Please contact [facility] with significant changes in patient condition    Hospice will notify the care team (including the hospitalist) to confirm date of inpatient hospice (GIP) admission.    New Epic encounter will not be created until hospice completes admission.   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 Yuliana Renteria    Summary of Visit (includes assessment, medications and any new orders):   Met with patient Gavin and his family bedside today for SOC into GIP hospice services diagnosis Senile Degeneration of the Brain, GIP support for Breathing and Excessive Secretion support. Patient assessed unresponsive to sound and touch, comfortable as no verbal and no non-verbal s/s of pain assessed. Pt afebrile, /69, P 89, 98% oxygenation on 10 shallow breaths per minute on 3 L continuous O2 nc. Reached out to attending MD Gross to support GIP order set and met with RN and discussed pt POC. Please reach out to hospice with questions or concerns and at time of expiration.    A sincere  appreciation to Aman's care team for the incredible care that has been given to Aman and his family, thank you kindly.    Valeria Weeks RN   [facility] [place]  [phone]

## 2024-02-05 NOTE — PROGRESS NOTES
MD Notification    Notified Person: MD    Notified Person Name: Dr. Gross    Notification Date/Time:  2/4/24  1370  Notification Interaction:  Verbal   Purpose of Notification:  Concerned about urine retention. Pt unable to void, complaints about bladder discomfort  Orders Received:  Moore inserted per MD verbal orders and will input written order later.  Comments:

## 2024-02-05 NOTE — PROGRESS NOTES
We talked to daughter Melanie who is at the hospital now and would like to move forward with GIP admission. We have deployed hospice RN Valeria to meet with pt/family. Valeria will complete hospice info visit and hospice admission                       Thank you,  Argelia Duran   [facility]ospice Admissions Coordinator

## 2024-02-06 PROBLEM — G30.9: Status: ACTIVE | Noted: 2024-01-01

## 2024-02-06 PROBLEM — F02.C0: Status: ACTIVE | Noted: 2024-01-01

## 2024-02-06 PROBLEM — I26.99 ACUTE PULMONARY EMBOLISM WITHOUT ACUTE COR PULMONALE (H): Status: ACTIVE | Noted: 2024-01-01

## 2024-02-06 PROBLEM — Z51.5 HOSPICE CARE: Status: ACTIVE | Noted: 2024-01-01

## 2024-02-06 NOTE — PLAN OF CARE
Shift Summary [id number]     Orientation: AOx1 (comfort cares)  Activity: A1 T/R Q2h  Diet/BS Checks: Soft mechanical, no meals  Tele:  N/A  IV Access/Drains: No IV access.   Pain Management: Denies. Scheduled Morphine given for comfort  Abnormal VS/Results: AVSS on 3L O2 via NC  Bowel/Bladder: Moore in place for retention. Pt. incontinent of bowels. No BM this shift.   Skin/Wounds: Preventative mepi on sacrum.   Consults: n/a  D/C Disposition: Pt. will no longer be discharged home  Other Info: Pt. slept throughout the shift. Family at bedside all day.       Spoke with Dr. Blane Fajardo this evening around 1955 to confirm if pt should receive antibiotic therapy or not since her note has rec of d/c ing abx, but still has dose of Vanc and cefepime ordered. Dr. Blane Fajardo stated it was only her recommendation to d/c abx, but up to hospitalist to decide. Message sent to adiel BAEZ for clarification if abx are to be given or not. Received return call from Dr. Hayder Disla, who stated it would be best to continue ordered abx as scheduled for tonight, and let attending make decision to d//c abx or not tomorrow. When I went to give pt abx, pt refused stating that the ID MD and pulmonologist that saw her told her they weren't doing her any good, and she doesn't need them. 07:25  Bedside shift change report given to Keven Russo RN (oncoming nurse) by Es Dela Cruz RN (offgoing nurse). Report included the following information SBAR, Kardex, MAR, Recent Results and Cardiac Rhythm NSR, rare PACs.

## 2024-02-06 NOTE — PHARMACY-ADMISSION MEDICATION HISTORY
Admission Medication History    Admission medication history previously completed on 2/1/2024.  See Pharmacist's note from that date if necessary.

## 2024-02-06 NOTE — DEATH PRONOUNCEMENT
MD DEATH PRONOUNCEMENT    Called to pronounce Gavin House dead.    Physical Exam: Spontaneous respirations absent, Breath sounds absent, Carotid pulse absent, Heart sounds absent, and Pupillary light reflex absent    Patient was pronounced dead at 1238: PM, February 6, 2024.    Preliminary Cause of Death: end stage dementia, multifocal pneumonia,, bilateral pulmonary emboli,    Principal Problem:    Hospice care  Active Problems:    NSTEMI (non-ST elevated myocardial infarction) (H)    Acute respiratory failure with hypoxia (H)    Multifocal pneumonia    Acute pulmonary embolism without acute cor pulmonale (H)    Severe Alzheimer's dementia (H)     This is a 78 year old male with a history of dementia, failure to thrive, BPH, PAF, WCT, CVA, alcohol use, admitted on 2/1/2024. He presents to the emergency department for evaluation of worsening shortness of breath days prior to admission but really over the past several months as well.  Ongoing weight loss since approximately November.      Per chart review, admitted 2/1-2/5 for failure to thrive and end-stage dementia, found to be in acute respiratory failure with hypoxia multifactorial with contributions including: Dysphagia, chronic aspiration and multifocal pneumonia (treated with Levaquin 1/17-1/27), bilateral pulmonary emboli, bilateral pulmonary effusions, and a NSTEMI/demand ischemia secondary to hypoxia.  unable to discharge to home on 2/5 due to worsening shortness of breath, Patient was transitioned to comfort cares and enrolled in inpatient hospice 2/5.  Patient had expected in-hospital death 2/6 as above.    Infectious disease present?: YES-multifocal pneumonia    Communicable disease present? (examples: HIV, chicken pox, TB, Ebola, CJD) :  NO    Multi-drug resistant organism present? (example: MRSA): NO    Please consider an autopsy if any of the following exist:  NO Unexpected or unexplained death during or following any dental, medical, or surgical  diagnostic treatment procedures.   NO Death of mother at or up to seven days after delivery.     NO All  and pediatric deaths.     NO Death where the cause is sufficiently obscure to delay completion of the death certificate.   NO Deaths in which autopsy would confirm a suspected illness/condition that would affect surviving family members or recipients of transplanted organs.     The following deaths must be reported to the 's Office:  NO A death that may be due entirely or in part to any factors other than natural disease (recent surgery, recent trauma, suspected abuse/neglect).   NO A death that may be an accident, suicide, or homicide.     NO Any sudden, unexpected death in which there is no prior history of significant heart disease or any other condition associated with sudden death.   NO A death under suspicious, unusual, or unexpected circumstances.    NO Any death which is apparently due to natural causes but in which the  does not have a personal physician familiar with the patient s medical history, social, or environmental situation or the circumstances of the terminal event.   NO Any death apparently due to Sudden Infant Death Syndrome.     NO Deaths that occur during, in association with, or as consequences of a diagnostic, therapeutic, or anesthetic procedure.   NO Any death in which a fracture of a major bone has occurred within the past (6) six months.   NO  A death of persons note seen by their physician within 120 days of demise.     NO Any death in which the  was an inmate of a public institution or was in the custody of Law Enforcement personnel.   NO  All unexpected deaths of children   NO Solid organ donors   NO Unidentified bodies   YES Deaths of persons whose bodies are to be cremated or otherwise disposed of so that the bodies will later be unavailable for examination;   NO Deaths unattended by a physician outside of a licensed healthcare facility or licensed  residential hospice program   NO Deaths occurring within 24 hours of arrival to a health care facility if death is unexpected.    NO Deaths associated with the decedent s employment.   NO Deaths attributed to acts of terrorism.   NO Any death in which there is uncertainty as to whether it is a medical examiner s care should be discussed with the medical investigator.      Body disposition: Autopsy was discussed with family member:  Spouse and Daughter in person.  Permission for autopsy was declined.    Steve Pollard PA-C  Hospitalist, and House Office BIBI  [facility]  Securely message with the Vocera Web Console (learn more here)  Text page via [facility] Paging/Directory

## 2024-02-06 NOTE — DISCHARGE SUMMARY
[facility]  Hospitalist Discharge Summary      Date of Admission:  2/1/2024  Date of Discharge:  2/5/2024  1:14 PM  Discharging Provider: SIOBHAN VELASCO MD  Discharge Service: Hospitalist Service    Discharge Diagnoses   End stage dementia  Failure to thrive  Dysphagia  NSTEMI  Paroxsymal Atrial fibrillation   Unstageable coccygenal ulceration   Shortness of breath   Acute respiratory failure with hypoxia  Chronic aspiration with multifocal infiltrates   Transition to comfort cares/hospice     Discharge Disposition   Discharged to hospice care.   Condition at discharge: Terminal    Hospital Course   Gavin House is a 78 year old male with a history of dementia, failure to thrive, BPH, PAF, WCT, CVA, alcohol use, admitted on 2/1/2024. He presents to the emergency department for evaluation of worsening shortness of breath over the past days, but really over the past several months as well.  Ongoing weight loss since approximately November.     FTT   Dementia (alzheimers) progressive end stage  Patient has been in failure to thrive mode with ongoing weight loss, not wanting to eat. Based on epic chart review had at least cognitive impairment back to 2021 with a Low SLUMS score. Overall decline at home PTA. He had been losing excessive weight and not eating. Family had been trying to evaluate potential contributing factors to his decline with doctor appointments and planned tests. On admission he was extremely cachectic with no muscle mass. Not eating. Confused and weak. Family dialogue with his wife and daughter regarding his overall health. Discussed risks and benefits of doing any further diagnostic testing including evaluation for possible dysphagia. Discussion with the family the consideration of further diagnostic testing. If found to have some malignancy or underlying condition that would require surgery, follow-up visits and assessments would the family want to pursue this given his  inability to really engage in his treatments and prolong his life. Unlikely he would comprehend or tolerate surgery, chemo or other higher risk interventions  in the setting of advancing dementia. Discussion regarding potential swallow studies, video swallows or assessments of his ability to eat or swallow.  Again discussed whether the patient would want to have his life extended and the current quality of life that he has now.  After going through options of more restorative cares including testing potential swallow studies endoscopies and ways to extend his life or determine diagnosis the family wanted to pursue comfort.   After family discussion the patient was transited to hospice/comfort cares. Stopped all non comfort meds.      Dysphagia  SLP evaluation recommended minced and moist Thin liquids.     Unstageable coccygeal ulceration  Wound nurse consulted for unstageable coccygeal ulceration present on admission (not open, but darkened tissue overlying coccyx/low sacrum).     Major neurocognitive disorder, suspect Alzheimer's dementia:   Progressive dementia with FTT      Atrial fibrillation, paroxysmal:   [id number] score of 5.  On Eliquis prior to admission: Stop amiodarone/eliquis with comfort cares . Metoprolol kept for rate control to avoid symptoms.      Acute respiratory failure with hypoxia, multifactorial: Contributions from restrictive lung disease with scoliosis and 56% vital capacity on prior pulmonary function tests, what appears to be chronic aspiration and multifocal pneumonia, bilateral pulmonary emboli, bilateral pleural effusions,  Multifocal pneumonia: Patient with scattered infiltrates, changing over time on CT scan with resolution of inferior left upper lobe opacity after recent completion of levofloxacin 1/17 - 1/27/2024.  Worsening predominantly right-sided basilar pneumonia highly suggestive of aspiration.  Initially started on ceftriaxone and Zithromax on admission: stopped Discussion  with the family at length as noted. Stopped antibiotics.     Non-ST elevation myocardial infarction:   Troponin elevated at 119.  Similar elevation during hospitalization in June attributed to demand ischemia.  Suspect demand ischemia in the setting of systemic hypoxia.    Very minimal coronary artery disease on angiogram June 2023. Heparin on admit. Stopped with transition to comfort     Consultations This Hospital Stay   PHARMACY IP CONSULT  SPEECH LANGUAGE PATH ADULT IP CONSULT  CARE MANAGEMENT / SOCIAL WORK IP CONSULT  WOUND OSTOMY CONTINENCE NURSE  IP CONSULT  SOCIAL WORK IP CONSULT  CARE MANAGEMENT / SOCIAL WORK IP CONSULT  [facility] INPATIENT HOSPICE ADULT CONSULT    Code Status   No CPR- Do NOT Intubate    Time Spent on this Encounter   I, SIOBHAN VELASCO MD, personally saw the patient today and spent less than or equal to 30 minutes discharging this patient.       SIOBHAN VELASCO MD  [address], [address]  [facility] [address]  Phone: [phone]  ______________________________________________________________________    Physical Exam   Vital Signs:                    Weight: 101 lbs 3.06 oz  General Appearance: Patient awake and confused: Cachexia  Respiratory: Distant BS with crackles in the bases  Cardiovascular: Irregular rate with no gallop   GI: + BS, soft,   Skin: No edema.        Primary Care Physician   Kash Poon    Discharge Orders   No discharge procedures on file.    Significant Results and Procedures   Most Recent 3 CBC's:  Recent Labs   Lab Test 02/01/24  2218 12/20/23  1142 07/13/23  0743   WBC 8.8 6.2 5.4   HGB 12.0* 13.3 14.0   MCV 94 97 97    308 274     Most Recent 3 BMP's:  Recent Labs   Lab Test 02/02/24  0800 02/01/24  2128 12/20/23  1142    134* 140   POTASSIUM 4.1 5.4* 4.5   CHLORIDE 97* 93* 101   CO2 29 28 27   BUN 17.9 18.0 56*   CR 0.87 0.88 2.00*   ANIONGAP 10 13 12   SHELBY 8.2* 8.6* 9.4   GLC 72 114* 99     Most Recent 2  LFT's:  Recent Labs   Lab Test 02/01/24 2128 12/20/23  1142 11/21/23  0834   AST  --  386* 58*   ALT  --  369* 52   ALKPHOS 86 109 105   BILITOTAL 1.4* 0.8 0.5     Most Recent 3 INR's:  Recent Labs   Lab Test 10/27/19  1108   INR 1.08     Most Recent INR's and Anticoagulation Dosing History:  Anticoagulation Dose History          Latest Ref Rng & Units 7/21/2008 10/27/2019   Recent Dosing and Labs   INR 0.86 - 1.14 0.99  1.08      Most Recent 3 Creatinines:  Recent Labs   Lab Test 02/02/24  0800 02/01/24 2128 12/20/23  1142   CR 0.87 0.88 2.00*     Most Recent 3 Hemoglobins:  Recent Labs   Lab Test 02/01/24 2218 12/20/23  1142 07/13/23  0743   HGB 12.0* 13.3 14.0     Most Recent 3 Troponin's:  Recent Labs   Lab Test 10/28/19  0523 10/27/19  1728 10/27/19  1108   TROPI 0.068* 0.369* 0.104*     Most Recent 3 BNP's:  Recent Labs   Lab Test 02/01/24  2128 10/27/19  1108   NTBNPI 2,785* 209     Most Recent D-dimer:No lab results found.  Most Recent Cholesterol Panel:  Recent Labs   Lab Test 11/21/23  0834   CHOL 102   LDL 44   HDL 41   TRIG 86     7-Day Micro Results       No results found for the last 168 hours.          Most Recent TSH and T4:  Recent Labs   Lab Test 11/21/23  0834   TSH 2.03     Most Recent Hemoglobin A1c:  Recent Labs   Lab Test 12/14/21  1100   A1C 5.0     Most Recent 6 glucoses:  Recent Labs   Lab Test 02/02/24  0800 02/01/24 2128 12/20/23  1142 12/14/23  1016 11/21/23  0834 08/09/23  1519   GLC 72 114* 99 102* 98 101*     Most Recent Urinalysis:  Recent Labs   Lab Test 12/20/23  1156   COLOR Yellow   APPEARANCE Clear   URINEGLC Negative   URINEBILI Moderate*   URINEKETONE 15*   SG >=1.030   UBLD Negative   URINEPH 5.5   PROTEIN Negative   UROBILINOGEN 2.0*   NITRITE Negative   LEUKEST Negative   RBCU 0-2   WBCU 0-5     Most Recent ABG:No lab results found.  Most Recent ESR & CRP:No lab results found.  Most Recent Anemia Panel:  Recent Labs   Lab Test 02/01/24  2219 12/20/23  1142  08/09/23  1519   WBC 8.8   < >  --    HGB 12.0*   < >  --    HCT 37.5*   < >  --    MCV 94   < >  --       < >  --    B12  --   --  296   FOLIC  --   --  11.4    < > = values in this interval not displayed.     Most Recent CPK:No lab results found.,   Results for orders placed or performed during the hospital encounter of 02/01/24   CT Chest PE Abdomen Pelvis w Contrast    Narrative    EXAM: CT CHEST PE ABDOMEN PELVIS W CONTRAST  LOCATION: [facility]  DATE: 2/1/2024    INDICATION: Shortness of breath, hypoxia, cachexia, RLQ abdominal pain.  COMPARISON: 01/16/2024.  TECHNIQUE: CT chest pulmonary angiogram and routine CT abdomen pelvis with IV contrast. Arterial phase through the chest and venous phase through the abdomen and pelvis. Multiplanar reformats and MIP reconstructions were performed. Dose reduction   techniques were used.   CONTRAST: 44 mL Isovue   370    FINDINGS:  ANGIOGRAM CHEST: There are a few small segmental emboli within both lower lobes. No evidence for right heart strain. Thoracic aorta is negative for aneurysm.    LUNGS AND PLEURA: Stable bilateral pleural effusions with stable subpleural consolidation lingula and left lower lobe. Left pleural calcification. Worsening infiltrate throughout much of the right lung. Again seen are peripheral groundglass infiltrates   in the right upper and middle lobe with resolution of a previously seen groundglass infiltrate within the inferior left upper lobe.    MEDIASTINUM/AXILLAE: Calcified nodes.    CORONARY ARTERY CALCIFICATION: Mild.    HEPATOBILIARY: Normal.    PANCREAS: Normal.    SPLEEN: A few small splenic infarcts. Calcified granulomata in the spleen.    ADRENAL GLANDS: Normal.    KIDNEYS/BLADDER: Tiny nonobstructing stone within the right kidney.    BOWEL: Colonic diverticula without CT evidence for diverticulitis. No appendicitis.    LYMPH NODES: Normal.    VASCULATURE: Stenosis of the proximal superior mesenteric  artery.    PELVIC ORGANS: Prostatic enlargement.    MUSCULOSKELETAL: Scoliosis with lumbar degenerative change.      Impression    IMPRESSION:  1.  A few small acute pulmonary emboli in segmental branches in the lower lobes.    2.  A few small peripheral splenic infarcts.    3.  No appendicitis.    4.  Colonic diverticula without CT evidence for diverticulitis.    5.  Bilateral pleural effusions. Worsening infiltrate within the right lower lobe with stable subpleural regions of consolidation and lingula and left lower lobe. Peripheral groundglass infiltrates in the right upper and middle lobes little change with   resolution of a previously seen subpleural infiltrate within the medial aspect of the left upper lobe.    6.  Results given to Dr. Alex Templeton.       Discharge Medications   Discharge Medication List as of 2/5/2024  1:18 PM        START taking these medications    Details   morphine 10 MG/5ML solution Take 2.5 mLs (5 mg) by mouth every 3 hours, No Print Out           CONTINUE these medications which have CHANGED    Details   metoprolol tartrate (LOPRESSOR) 25 MG tablet Take 0.5 tablets (12.5 mg) by mouth 2 times daily, Disp-60 tablet, R-0, E-Prescribe      predniSONE (DELTASONE) 20 MG tablet Take 1 tablet (20 mg) by mouth daily, Disp-7 tablet, R-0, E-Prescribe           CONTINUE these medications which have NOT CHANGED    Details   albuterol (PROAIR HFA/PROVENTIL HFA/VENTOLIN HFA) 108 (90 Base) MCG/ACT inhaler Inhale 2 puffs into the lungs every 6 hours as needed for shortness of breath, wheezing or cough, Disp-18 g, R-3, E-PrescribePharmacy may dispense brand covered by insurance (Proair, or proventil or ventolin or generic albuterol inhaler)      tamsulosin (FLOMAX) 0.4 MG capsule Take 1 capsule (0.4 mg) by mouth daily, Disp-90 capsule, R-3, E-Prescribe           STOP taking these medications       amiodarone (PACERONE) 200 MG tablet Comments:   Reason for Stopping:         apixaban ANTICOAGULANT  (ELIQUIS ANTICOAGULANT) 2.5 MG tablet Comments:   Reason for Stopping:         atorvastatin (LIPITOR) 20 MG tablet Comments:   Reason for Stopping:         Ca Carbonate-Mag Hydroxide (ROLAIDS PO) Comments:   Reason for Stopping:         chlorthalidone (HYGROTON) 25 MG tablet Comments:   Reason for Stopping:         potassium chloride ER (KLOR-CON M) 10 MEQ CR tablet Comments:   Reason for Stopping:             Allergies   Allergies   Allergen Reactions    Penicillins

## 2024-02-06 NOTE — PLAN OF CARE
Pt  at 1238 with family at bedside. Wife and Daughter in possession of patient belongings, as witnessed by this nurse. Donor referral line called - pt not a candidate for organ donation at this time. Pt does qualify for eye donation, and family will be contacted by donation coordinator. Pt family declined autopsy at this time.

## 2024-02-06 NOTE — H&P
[facility]    History and Physical - Hospitalist Service       Date of Admission:  2/5/2024    Assessment & Plan   Gavin House is a 78 year old male with a history of dementia, failure to thrive, BPH, PAF, WCT, CVA, alcohol use, admitted on 2/1/2024. He presents to the emergency department for evaluation of worsening shortness of breath over the past days, but really over the past several months as well.  Ongoing weight loss since approximately November.    Acute shortness of breath   Hospitalized 2/1-2/5 for failure to thrive and end stage dementia. (See summary) decision to transition patient to comfort cares.   He was initially planned to go home with hospice on 2/5 but developed increasing shortness of breath and required acute symptom management. In addition his daughter had expressed some concerns of her mother (Wife) having some anxiety over caring for the patient and not sure she could do this at home.   Decision on am 2/5 to admit to inpatient hospice GIP. Patient appears SOB, reporting he needed an inhaler, fan and more oxygen.   Started on comfort care orders  Scheduled morphine 5 mg q 4 hours.   Prn morphine as needed  Ativan for anxiety and dyspnea.   Oxygen   Called patients family and updated on recommendations to keep in hospital and transition to hospice.        FTT   Dementia (alzheimers) progressive end stage  Patient has been in failure to thrive mode with ongoing weight loss, not wanting to eat. Based on epic chart review had at least cognitive impairment back to 2021 with a Low SLUMS score. Overall decline at home PTA. He had been losing excessive weight and not eating. Family had been trying to evaluate potential contributing factors to his decline with doctor appointments and planned tests. On admission he was extremely cachectic with no muscle mass. Not eating. Confused and weak. Family dialogue with his wife and daughter regarding his overall health. Discussed  risks and benefits of doing any further diagnostic testing including evaluation for possible dysphagia. Discussion with the family the consideration of further diagnostic testing. If found to have some malignancy or underlying condition that would require surgery, follow-up visits and assessments would the family want to pursue this given his inability to really engage in his treatments and prolong his life. Unlikely he would comprehend or tolerate surgery, chemo or other higher risk interventions  in the setting of advancing dementia. Discussion regarding potential swallow studies, video swallows or assessments of his ability to eat or swallow.  Again discussed whether the patient would want to have his life extended and the current quality of life that he has now.  After going through options of more restorative cares including testing potential swallow studies endoscopies and ways to extend his life or determine diagnosis the family wanted to pursue comfort.   After family discussion the patient was transited to hospice/comfort cares. Stopped all non comfort meds.      Dysphagia  SLP evaluation recommended minced and moist Thin liquids.      Unstageable coccygeal ulceration  Wound nurse consulted for unstageable coccygeal ulceration present on admission (not open, but darkened tissue overlying coccyx/low sacrum).     Major neurocognitive disorder, suspect Alzheimer's dementia:   Progressive dementia with FTT      Atrial fibrillation, paroxysmal:   [id number] score of 5.  On Eliquis prior to admission: Stop amiodarone/eliquis with comfort cares . Metoprolol kept for rate control to avoid symptoms.      Acute respiratory failure with hypoxia, multifactorial: Contributions from restrictive lung disease with scoliosis and 56% vital capacity on prior pulmonary function tests, what appears to be chronic aspiration and multifocal pneumonia, bilateral pulmonary emboli, bilateral pleural effusions,  Multifocal  pneumonia: Patient with scattered infiltrates, changing over time on CT scan with resolution of inferior left upper lobe opacity after recent completion of levofloxacin 1/17 - 1/27/2024.  Worsening predominantly right-sided basilar pneumonia highly suggestive of aspiration.  Initially started on ceftriaxone and Zithromax on admission: stopped Discussion with the family at length as noted. Stopped antibiotics.      Non-ST elevation myocardial infarction:   Troponin elevated at 119.  Similar elevation during hospitalization in June attributed to demand ischemia.  Suspect demand ischemia in the setting of systemic hypoxia.    Very minimal coronary artery disease on angiogram June 2023. Heparin on admit. Stopped with transition to comfort            Diet:  as tolerated but unable to eat with LOC  DVT Prophylaxis: not indicated with comfort cares   Moore Catheter: Not present  Lines: None     Cardiac Monitoring: None  Code Status: No CPR- Do NOT Intubate      Clinically Significant Risk Factors Present on Admission                  # Hypertension: Noted on problem list  # Chronic heart failure with preserved ejection fraction: heart failure noted on problem list and last echo with EF >50%         # Financial/Environmental Concerns:           Disposition Plan      Expected Discharge Date: 02/08/2024                  SIOBHAN VELASCO MD  Hospitalist Service  [facility]  Securely message with [employer] (more info)  Text page via [employer] Paging/Directory     ______________________________________________________________________    Chief Complaint   Transition to inpatient hospice     History is obtained from the patient  See discharge summary     History of Present Illness   Gavin House is a 78 year old male with a history of dementia, failure to thrive, BPH, PAF, WCT, CVA, alcohol use, admitted on 2/1/2024. He presents to the emergency department for evaluation of worsening shortness of breath over the  past days, but really over the past several months as well.  Ongoing weight loss since approximately November.   Admitted 2/1-2/5 as outlined in discharge summary with failure to thrive. Decision to transition to comfort cares.   Initial plans to go home with hospice but had acute SOB and symptoms not managed so admitted to inpatient hospice GIP 2/5       Past Medical History    Past Medical History:   Diagnosis Date    Alcohol use     Allergic rhinitis, cause unspecified     Diverticulosis of colon     Diverticulitis 5/2010    HTN (hypertension)     Memory loss     Mild intermittent asthma     Paroxysmal atrial fibrillation (H) 05/09/2016    Pneumothorax     PVC (premature ventricular contraction)     intermittent bigeminy    Rosacea     Scoliosis (and kyphoscoliosis), idiopathic     Stroke (H)        Past Surgical History   Past Surgical History:   Procedure Laterality Date    CV CORONARY ANGIOGRAM N/A 6/29/2023    Procedure: Coronary Angiogram;  Surgeon: Jennifer Daniel MD;  Location:  [facility]ARD[facility] CATH LAB    [facility] NONSPECIFIC PROCEDURE  1974    right inguinal herniorraphy    [facility] NONSPECIFIC PROCEDURE  age 16    L inguinal herniorraphy       Prior to Admission Medications   Prior to Admission Medications   Prescriptions Last Dose Informant Patient Reported? Taking?   albuterol (PROAIR HFA/PROVENTIL HFA/VENTOLIN HFA) 108 (90 Base) MCG/ACT inhaler  Spouse/Significant Other No No   Sig: Inhale 2 puffs into the lungs every 6 hours as needed for shortness of breath, wheezing or cough   metoprolol tartrate (LOPRESSOR) 25 MG tablet   No No   Sig: Take 0.5 tablets (12.5 mg) by mouth 2 times daily   morphine 10 MG/5ML solution   No No   Sig: Take 2.5 mLs (5 mg) by mouth every 3 hours   predniSONE (DELTASONE) 20 MG tablet   No No   Sig: Take 1 tablet (20 mg) by mouth daily   tamsulosin (FLOMAX) 0.4 MG capsule  Spouse/Significant Other No No   Sig: Take 1 capsule (0.4 mg) by mouth daily      Facility-Administered  "Medications: None        Review of Systems    The 10 point Review of Systems is negative other than noted in the HPI or here.     Social History   I have reviewed this patient's social history and updated it with pertinent information if needed.  Social History     Tobacco Use    Smoking status: Former     Packs/day: 1.00     Years: 23.00     Additional pack years: 0.00     Total pack years: 23.00     Types: Cigarettes     Start date:      Quit date:      Years since quittin.1    Smokeless tobacco: Never   Vaping Use    Vaping Use: Never used   Substance Use Topics    Alcohol use: Yes     Alcohol/week: 0.0 standard drinks of alcohol     Comment: 2 vodka drinks a day ... or more    Drug use: No         Family History   I have reviewed this patient's family history and updated it with pertinent information if needed.  Family History   Problem Relation Age of Onset    Colon Cancer Father          of this age 69    Alcoholism Father     Heart Disease Mother         \"enlarged heart\"; d: age 75    C.A.D. Brother          in his 80's     Alcoholism Brother          in his 60's          Allergies   Allergies   Allergen Reactions    Penicillins         Physical Exam   Vital Signs:               O2 Device: Nasal cannula Oxygen Delivery: 3 LPM  Weight: 0 lbs 0 oz    General Appearance: Patient trying to sit up in the bed. Complaining of SOB  Anxious   Respiratory: Crackles in lungs bilaterally   Cardiovascular: Irregular rate with no gallop or rub  GI: + BS, soft,   Skin: No edema. No redness.   Overall cachexia     Medical Decision Making       55 MINUTES SPENT BY ME on the date of service doing chart review, history, exam, documentation & further activities per the note.    Patient was admitted to hospice and discharged from medicine service on the same day.   Multiple dialogues with family in transition to inpatient hospice. In addition multiple medication changes for comfort and assessment. " Admitted on same day as discharge process with orders, family communications and bedside management of symptoms     Data         Imaging results reviewed over the past 24 hrs:   No results found for this or any previous visit (from the past 24 hour(s)).

## 2024-02-06 NOTE — PLAN OF CARE
Goal Outcome Evaluation:    Shift Summary [id number]     Orientation: unable to assess  Activity: A1 T/R Q2h - positioned maintained/family refused T/R   Diet/BS Checks: Soft mechanical  Tele:  N/A  IV Access/Drains: No IV access. MD is aware.   Pain Management: PRN Morphine given for comfort  Abnormal VS/Results: AVSS on 3L O2 via NC  Bowel/Bladder: Moore placed for retention and Incontinent of bowels. No BM this shift.   Skin/Wounds: Preventative mepi on sacrum.   Consults: n/a  D/C Disposition: Pt. will no longer be discharged home  Other Info: comfort care. Family at bedside

## 2024-02-06 NOTE — PROGRESS NOTES
[facility]    Medicine Progress Note - Hospitalist Service    Date of Admission:  2/1/2024    Assessment & Plan   Gavin House is a 78 year old male with a history of dementia, failure to thrive, BPH, PAF, WCT, CVA, alcohol use, admitted on 2/1/2024. He presents to the emergency department for evaluation of worsening shortness of breath days prior to admission but really over the past several months as well.  Ongoing weight loss since approximately November.     Advanced care planning:   FTT   Dementia (alzheimers)   Now on comfort care.    Previous medical problems as below    Unstageable coccygeal ulceration  WOC following     Suspect acute kidney injury:  Moore for comfort and retention      Atrial fibrillation, paroxysmal:   Acute respiratory failure with hypoxia, multifactorial: Contributions from restrictive lung disease with scoliosis and 56% vital capacity on prior pulmonary function tests, what appears to be chronic aspiration and multifocal pneumonia, bilateral pulmonary emboli, bilateral pleural effusions,  Multifocal pneumonia: Patient with scattered infiltrates, changing over time on CT scan with resolution of inferior left upper lobe opacity after recent completion of levofloxacin 1/17 - 1/27/2024.  Worsening predominantly right-sided basilar   Non-ST elevation myocardial infarction:   -comfort    Diet: for comfort.     DVT Prophylaxis: none with transition to hospice   Moore Catheter: Not present  Lines: None     Cardiac Monitoring: None  Code Status: No CPR- Do NOT Intubate      Clinically Significant Risk Factors Present on Admission                    # Hypertension: Noted on problem list  # Chronic heart failure with preserved ejection fraction: heart failure noted on problem list and last echo with EF >50%         # Financial/Environmental Concerns:           Disposition Plan      Expected Discharge Date: 02/08/2024                    Magdalene Elder DO  Hospitalist  [facility]  Securely message with [employer] (more info)  Text page via [email or web address] Paging/Directory   ______________________________________________________________________    Physical Exam   Vital Signs:               O2 Device: Nasal cannula Oxygen Delivery: 3 LPM  Weight: 0 lbs 0 oz    General Appearance: Patient appears to be comfortable.  He has no respiratory distress.  Not responsive to voice or commands.lying in bed with mouth open but appears comfortable. Tearful family at bedside      Medical Decision Making       30 MINUTES SPENT BY ME on the date of service doing chart review, history, exam, documentation & further activities per the note.    Data         Imaging results reviewed over the past 24 hrs:   No results found for this or any previous visit (from the past 24 hour(s)).

## 2024-02-07 NOTE — DISCHARGE SUMMARY
[facility]  Hospitalist Discharge Summary      Date of Admission:  2024  Date of Discharge:  2024  5:17 PM  Discharging Provider: Magdalene Elder DO  Discharge Service: Hospitalist Service    Discharge Diagnoses   See below    Clinically Significant Risk Factors              Discharge Disposition   Patient  during this admission  Condition at discharge:     Hospital Course   Gavin House is a 78 year old male with a history of dementia, failure to thrive, BPH, PAF, WCT, CVA, alcohol use, admitted on 2024. He presented to the emergency department for evaluation of worsening shortness of breath days prior to admission but really over the past several months as well.  Admitted to inpatient hospice for failure to thrive in setting of advanced dementia     Advanced care planning:   FTT   Dementia (alzheimers)   Passed away peacefully with family at bedside     Previous medical problems as below     Unstageable coccygeal ulceration  WOC following     Suspect acute kidney injury:  Moore for comfort and retention      Atrial fibrillation, paroxysmal:   Acute respiratory failure with hypoxia, multifactorial: Contributions from restrictive lung disease with scoliosis and 56% vital capacity on prior pulmonary function tests, what appears to be chronic aspiration and multifocal pneumonia, bilateral pulmonary emboli, bilateral pleural effusions,  Multifocal pneumonia: Patient with scattered infiltrates, changing over time on CT scan with resolution of inferior left upper lobe opacity after recent completion of levofloxacin  - 2024.  Worsening predominantly right-sided basilar   Non-ST elevation myocardial infarction:   -comfort       Consultations This Hospital Stay   None    Code Status   Prior    Time Spent on this Encounter   IMagdalene DO, personally saw the patient today and spent greater than 30 minutes discharging this patient.       Magdalene Elder  DO  [facility] [address] [facility]  [address]  [place] [address]  Phone: [phone]  ______________________________________________________________________    Physical Exam   Vital Signs:                    Weight: 0 lbs 0 oz       Primary Care Physician   Kash Eller    Discharge Orders   No discharge procedures on file.    Significant Results and Procedures   Results for orders placed or performed during the hospital encounter of 02/01/24   CT Chest PE Abdomen Pelvis w Contrast    Narrative    EXAM: CT CHEST PE ABDOMEN PELVIS W CONTRAST  LOCATION: [facility]  DATE: 2/1/2024    INDICATION: Shortness of breath, hypoxia, cachexia, RLQ abdominal pain.  COMPARISON: 01/16/2024.  TECHNIQUE: CT chest pulmonary angiogram and routine CT abdomen pelvis with IV contrast. Arterial phase through the chest and venous phase through the abdomen and pelvis. Multiplanar reformats and MIP reconstructions were performed. Dose reduction   techniques were used.   CONTRAST: 44 mL Isovue   370    FINDINGS:  ANGIOGRAM CHEST: There are a few small segmental emboli within both lower lobes. No evidence for right heart strain. Thoracic aorta is negative for aneurysm.    LUNGS AND PLEURA: Stable bilateral pleural effusions with stable subpleural consolidation lingula and left lower lobe. Left pleural calcification. Worsening infiltrate throughout much of the right lung. Again seen are peripheral groundglass infiltrates   in the right upper and middle lobe with resolution of a previously seen groundglass infiltrate within the inferior left upper lobe.    MEDIASTINUM/AXILLAE: Calcified nodes.    CORONARY ARTERY CALCIFICATION: Mild.    HEPATOBILIARY: Normal.    PANCREAS: Normal.    SPLEEN: A few small splenic infarcts. Calcified granulomata in the spleen.    ADRENAL GLANDS: Normal.    KIDNEYS/BLADDER: Tiny nonobstructing stone within the right kidney.    BOWEL: Colonic diverticula  without CT evidence for diverticulitis. No appendicitis.    LYMPH NODES: Normal.    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    PELVIC ORGANS: Prostatic enlargement.    MUSCULOSKELETAL: Scoliosis with lumbar degenerative change.      Impression    IMPRESSION:  1.  A few small acute pulmonary emboli in segmental branches in the lower lobes.    2.  A few small peripheral splenic infarcts.    3.  No appendicitis.    4.  Colonic diverticula without CT evidence for diverticulitis.    5.  Bilateral pleural effusions. Worsening infiltrate within the right lower lobe with stable subpleural regions of consolidation and lingula and left lower lobe. Peripheral groundglass infiltrates in the right upper and middle lobes little change with   resolution of a previously seen subpleural infiltrate within the medial aspect of the left upper lobe.    6.  Results given to Dr. Alex Templeton.       Discharge Medications   Discharge Medication List as of 2/6/2024  5:17 PM        CONTINUE these medications which have NOT CHANGED    Details   albuterol (PROAIR HFA/PROVENTIL HFA/VENTOLIN HFA) 108 (90 Base) MCG/ACT inhaler Inhale 2 puffs into the lungs every 6 hours as needed for shortness of breath, wheezing or cough, Disp-18 g, R-3, E-PrescribePharmacy may dispense brand covered by insurance (Proair, or proventil or ventolin or generic albuterol inhaler)      metoprolol tartrate (LOPRESSOR) 25 MG tablet Take 0.5 tablets (12.5 mg) by mouth 2 times daily, Disp-60 tablet, R-0, E-Prescribe      morphine 10 MG/5ML solution Take 2.5 mLs (5 mg) by mouth every 3 hours, No Print Out      predniSONE (DELTASONE) 20 MG tablet Take 1 tablet (20 mg) by mouth daily, Disp-7 tablet, R-0, E-Prescribe      tamsulosin (FLOMAX) 0.4 MG capsule Take 1 capsule (0.4 mg) by mouth daily, Disp-90 capsule, R-3, E-Prescribe           Allergies   Allergies   Allergen Reactions    Penicillins

## 2024-02-14 ENCOUNTER — MEDICAL CORRESPONDENCE (OUTPATIENT)
Dept: HEALTH INFORMATION MANAGEMENT | Facility: CLINIC | Age: 79
End: 2024-02-14
Payer: COMMERCIAL

## 2024-02-27 ENCOUNTER — MEDICAL CORRESPONDENCE (OUTPATIENT)
Dept: HEALTH INFORMATION MANAGEMENT | Facility: CLINIC | Age: 79
End: 2024-02-27

## 2024-04-15 RX ORDER — CHLORTHALIDONE 25 MG/1
25 TABLET ORAL DAILY
Qty: 90 TABLET | Refills: 0 | OUTPATIENT
Start: 2024-04-15

## (undated) DEVICE — KIT HAND CONTROL ANGIOTOUCH ACIST 65CM AT-P65

## (undated) DEVICE — MANIFOLD KIT ANGIO AUTOMATED 014613

## (undated) DEVICE — INTRO GLIDESHEATH SLENDER 6FR 10X45CM 60-1060

## (undated) DEVICE — CATH ANGIO JUDKINS JL4 6FRX100CM INFINITI 534620T

## (undated) DEVICE — CATH ANGIO JUDKINS R4 6FRX100CM INFINITI 534621T

## (undated) DEVICE — SLEEVE TR BAND RADIAL COMPRESSION DEVICE 24CM TRB24-REG

## (undated) DEVICE — GLIDEWIRE TERUMO .035X180CM 1.5,, J-TIP GR3525

## (undated) DEVICE — DEFIB PRO-PADZ LVP LQD GEL ADULT 8900-2105-01

## (undated) DEVICE — INTRO SHEATH 7FRX10CM PINNACLE RSS702

## (undated) DEVICE — WIRE GUIDE 0.035"X260CM SAFE-T-J EXCHANGE G00517

## (undated) DEVICE — TOTE ANGIO CORP PC15AT SAN32CC83O

## (undated) DEVICE — CATH ANGIO 6FR AR MOD THRU   L

## (undated) RX ORDER — LIDOCAINE HYDROCHLORIDE 10 MG/ML
INJECTION, SOLUTION EPIDURAL; INFILTRATION; INTRACAUDAL; PERINEURAL
Status: DISPENSED
Start: 2023-01-01

## (undated) RX ORDER — FENTANYL CITRATE 50 UG/ML
INJECTION, SOLUTION INTRAMUSCULAR; INTRAVENOUS
Status: DISPENSED
Start: 2023-01-01

## (undated) RX ORDER — HEPARIN SODIUM 200 [USP'U]/100ML
INJECTION, SOLUTION INTRAVENOUS
Status: DISPENSED
Start: 2023-01-01

## (undated) RX ORDER — NITROGLYCERIN 5 MG/ML
VIAL (ML) INTRAVENOUS
Status: DISPENSED
Start: 2023-01-01

## (undated) RX ORDER — HEPARIN SODIUM 1000 [USP'U]/ML
INJECTION, SOLUTION INTRAVENOUS; SUBCUTANEOUS
Status: DISPENSED
Start: 2023-01-01

## (undated) RX ORDER — VERAPAMIL HYDROCHLORIDE 2.5 MG/ML
INJECTION, SOLUTION INTRAVENOUS
Status: DISPENSED
Start: 2023-01-01